# Patient Record
Sex: MALE | Race: WHITE | Employment: FULL TIME | URBAN - METROPOLITAN AREA
[De-identification: names, ages, dates, MRNs, and addresses within clinical notes are randomized per-mention and may not be internally consistent; named-entity substitution may affect disease eponyms.]

---

## 2017-05-02 RX ORDER — AZITHROMYCIN 250 MG/1
TABLET, FILM COATED ORAL
Qty: 6 TAB | Refills: 0 | Status: SHIPPED | OUTPATIENT
Start: 2017-05-02 | End: 2017-05-07

## 2017-05-24 ENCOUNTER — TELEPHONE (OUTPATIENT)
Dept: INTERNAL MEDICINE CLINIC | Age: 62
End: 2017-05-24

## 2017-05-24 RX ORDER — CEFUROXIME AXETIL 500 MG/1
TABLET ORAL
Qty: 20 TAB | Refills: 0 | Status: SHIPPED | OUTPATIENT
Start: 2017-05-24 | End: 2021-11-17

## 2018-03-14 ENCOUNTER — TELEPHONE (OUTPATIENT)
Dept: INTERNAL MEDICINE CLINIC | Age: 63
End: 2018-03-14

## 2018-03-14 RX ORDER — AZITHROMYCIN 250 MG/1
TABLET, FILM COATED ORAL
Qty: 6 TAB | Refills: 0 | Status: CANCELLED | OUTPATIENT
Start: 2018-03-14 | End: 2018-03-19

## 2018-03-14 NOTE — TELEPHONE ENCOUNTER
Requested Prescriptions     Pending Prescriptions Disp Refills    azithromycin (ZITHROMAX) 250 mg tablet 6 Tab 0     Sig: Take 2 tablets by mouth today then 1 tablet daily     Note:  Patient has sinus infection and request this medication

## 2018-03-15 RX ORDER — AZITHROMYCIN 250 MG/1
TABLET, FILM COATED ORAL
Qty: 6 TAB | Refills: 0 | Status: SHIPPED | OUTPATIENT
Start: 2018-03-15 | End: 2018-03-15 | Stop reason: SDUPTHER

## 2018-03-15 RX ORDER — AZITHROMYCIN 250 MG/1
TABLET, FILM COATED ORAL
Qty: 6 TAB | Refills: 0 | Status: SHIPPED | OUTPATIENT
Start: 2018-03-15 | End: 2018-03-20

## 2021-11-17 ENCOUNTER — VIRTUAL VISIT (OUTPATIENT)
Dept: FAMILY MEDICINE CLINIC | Age: 66
End: 2021-11-17
Payer: COMMERCIAL

## 2021-11-17 DIAGNOSIS — Z87.01 HISTORY OF RECENT PNEUMONIA: ICD-10-CM

## 2021-11-17 DIAGNOSIS — I72.9 ANEURYSM (HCC): Primary | ICD-10-CM

## 2021-11-17 DIAGNOSIS — I21.4 NON-ST ELEVATION MI (NSTEMI) (HCC): ICD-10-CM

## 2021-11-17 DIAGNOSIS — N28.9 RENAL INSUFFICIENCY: ICD-10-CM

## 2021-11-17 DIAGNOSIS — I51.9 SYSTOLIC DYSFUNCTION: ICD-10-CM

## 2021-11-17 DIAGNOSIS — I10 PRIMARY HYPERTENSION: ICD-10-CM

## 2021-11-17 DIAGNOSIS — Z98.2 S/P VP SHUNT: ICD-10-CM

## 2021-11-17 PROCEDURE — 99203 OFFICE O/P NEW LOW 30 MIN: CPT | Performed by: FAMILY MEDICINE

## 2021-11-17 RX ORDER — ACETAMINOPHEN 325 MG/1
650 TABLET ORAL
COMMUNITY
Start: 2021-11-08 | End: 2021-11-17 | Stop reason: SDUPTHER

## 2021-11-17 RX ORDER — HYDROCHLOROTHIAZIDE 50 MG/1
50 TABLET ORAL DAILY
COMMUNITY
Start: 2021-11-08 | End: 2021-11-17 | Stop reason: SDUPTHER

## 2021-11-17 RX ORDER — ACETAMINOPHEN 325 MG/1
650 TABLET ORAL
Qty: 90 TABLET | Refills: 0 | Status: SHIPPED | OUTPATIENT
Start: 2021-11-17 | End: 2021-12-07

## 2021-11-17 RX ORDER — NITROGLYCERIN 0.4 MG/1
0.4 TABLET SUBLINGUAL
COMMUNITY
Start: 2021-11-08 | End: 2021-11-18

## 2021-11-17 RX ORDER — POLYETHYLENE GLYCOL 3350 17 G/17G
17 POWDER, FOR SOLUTION ORAL DAILY
COMMUNITY
Start: 2021-11-09 | End: 2021-12-09

## 2021-11-17 RX ORDER — LANOLIN ALCOHOL/MO/W.PET/CERES
3 CREAM (GRAM) TOPICAL
Qty: 90 TABLET | Refills: 1 | Status: SHIPPED | OUTPATIENT
Start: 2021-11-17 | End: 2021-12-17

## 2021-11-17 RX ORDER — GUAIFENESIN 100 MG/5ML
81 LIQUID (ML) ORAL DAILY
COMMUNITY
Start: 2021-11-08 | End: 2021-11-17 | Stop reason: SDUPTHER

## 2021-11-17 RX ORDER — LANOLIN ALCOHOL/MO/W.PET/CERES
3 CREAM (GRAM) TOPICAL
COMMUNITY
Start: 2021-11-08 | End: 2021-11-17 | Stop reason: SDUPTHER

## 2021-11-17 RX ORDER — HYDRALAZINE HYDROCHLORIDE 50 MG/1
50 TABLET, FILM COATED ORAL 3 TIMES DAILY
Qty: 270 TABLET | Refills: 1 | Status: SHIPPED | OUTPATIENT
Start: 2021-11-17 | End: 2022-04-05 | Stop reason: SDUPTHER

## 2021-11-17 RX ORDER — HYDRALAZINE HYDROCHLORIDE 50 MG/1
50 TABLET, FILM COATED ORAL
COMMUNITY
Start: 2021-11-08 | End: 2021-11-17 | Stop reason: SDUPTHER

## 2021-11-17 RX ORDER — AMLODIPINE BESYLATE 5 MG/1
5 TABLET ORAL DAILY
Qty: 90 TABLET | Refills: 1 | Status: SHIPPED | OUTPATIENT
Start: 2021-11-17 | End: 2022-02-16

## 2021-11-17 RX ORDER — DICLOFENAC SODIUM 10 MG/G
2 GEL TOPICAL 4 TIMES DAILY
COMMUNITY
Start: 2021-11-08 | End: 2022-05-02

## 2021-11-17 RX ORDER — GUAIFENESIN 100 MG/5ML
81 LIQUID (ML) ORAL DAILY
Qty: 90 TABLET | Refills: 1 | Status: SHIPPED | OUTPATIENT
Start: 2021-11-17 | End: 2021-12-17

## 2021-11-17 RX ORDER — DOCUSATE SODIUM 100 MG/1
100 CAPSULE, LIQUID FILLED ORAL
COMMUNITY
Start: 2021-11-08 | End: 2021-12-08

## 2021-11-17 RX ORDER — HYDROCHLOROTHIAZIDE 50 MG/1
50 TABLET ORAL DAILY
Qty: 90 TABLET | Refills: 1 | Status: SHIPPED | OUTPATIENT
Start: 2021-11-17 | End: 2021-12-17

## 2021-11-17 RX ORDER — CALCIUM CARBONATE 200(500)MG
500 TABLET,CHEWABLE ORAL DAILY PRN
COMMUNITY
Start: 2021-11-08 | End: 2021-11-28

## 2021-11-17 RX ORDER — LANOLIN ALCOHOL/MO/W.PET/CERES
100 CREAM (GRAM) TOPICAL DAILY
COMMUNITY
Start: 2021-11-08 | End: 2021-11-17 | Stop reason: SDUPTHER

## 2021-11-17 RX ORDER — LISINOPRIL 5 MG/1
5 TABLET ORAL DAILY
Qty: 90 TABLET | Refills: 1 | Status: SHIPPED
Start: 2021-11-17 | End: 2022-02-16 | Stop reason: ALTCHOICE

## 2021-11-17 RX ORDER — LISINOPRIL 5 MG/1
5 TABLET ORAL DAILY
COMMUNITY
Start: 2021-11-08 | End: 2021-11-17 | Stop reason: SDUPTHER

## 2021-11-17 RX ORDER — BISACODYL 5 MG
10 TABLET, DELAYED RELEASE (ENTERIC COATED) ORAL
COMMUNITY
Start: 2021-11-08 | End: 2021-12-08

## 2021-11-17 RX ORDER — LANOLIN ALCOHOL/MO/W.PET/CERES
100 CREAM (GRAM) TOPICAL DAILY
Qty: 90 TABLET | Refills: 1 | Status: SHIPPED | OUTPATIENT
Start: 2021-11-17 | End: 2021-12-17

## 2021-11-17 RX ORDER — AMLODIPINE BESYLATE 5 MG/1
5 TABLET ORAL DAILY
COMMUNITY
Start: 2021-11-09 | End: 2021-11-17 | Stop reason: SDUPTHER

## 2021-11-17 NOTE — PROGRESS NOTES
Milagro Ortiz is a 61 y.o. male who was seen by synchronous (real-time) audio-video technology on 11/17/2021 for New Patient and Hospital Follow Up (SNG Aneurysm and elevated Blood Pressure)        Assessment & Plan:   Diagnoses and all orders for this visit:    Aneurysm [facility]): Post stent and revision. Coming up appointment with neurology in 2 months after repeating CT. Surgical site healed well. No concern. Currently doing outpatient rehab. PT OT and speech. Has not made an appointment with cardiology and nephrology yet. No new referral.  Systolic dysfunction but currently no signs of volume overload. We will continue current plan pain does not express to cardiology. Reviewed pressure. Currently diastolic blood pressure low-salt diet family take cardiovascular accommodation. Comments:  MCA, subarachnoid hemorrhage, presence of  shunt . h/o NSTEMI and hypoxia. required intubation during hospitalization. Systolic dysfunction: Ejection fraction around 35%. Currently no signs of volume overload. He had a recent non-ST elevation MI. On risk factor management. Taking aspirin. DC Plavix met neurologist upon discharge  -     REFERRAL TO CARDIOLOGY    Renal insufficiency: Creatinine from discharge from rehab was around 1.6. Avoid NSAIDs. Will observe  -     REFERRAL TO NEPHROLOGY    Non-ST elevation MI (NSTEMI) ([facility] [address]): No anginal symptoms. Risk factor management    S/P  shunt    History of recent pneumonia: No cold or cough or wheezing. Probably aspiration. Resolved    Primary hypertension: Currently blood pressure has improved significantly. Taking medication with compliance. Will observe and follow neurosurgeon and cardiology recommendation    Other orders  -     acetaminophen (TYLENOL) 325 mg tablet; Take 2 Tablets by mouth every four (4) hours as needed for Pain for up to 20 days. , Normal, Disp-90 Tablet, R-0  -     amLODIPine (NORVASC) 5 mg tablet; Take 1 Tablet by mouth daily. , Normal, Disp-90 Tablet, R-1  -     aspirin 81 mg chewable tablet; Take 1 Tablet by mouth daily for 30 days. , Normal, Disp-90 Tablet, R-1  -     hydrALAZINE (APRESOLINE) 50 mg tablet; Take 1 Tablet by mouth three (3) times daily for 90 days. , Normal, Disp-270 Tablet, R-1  -     lisinopriL (PRINIVIL, ZESTRIL) 5 mg tablet; Take 1 Tablet by mouth daily. , Normal, Disp-90 Tablet, R-1  -     thiamine HCL (B-1) 100 mg tablet; Take 1 Tablet by mouth daily for 30 days. , Normal, Disp-90 Tablet, R-1  -     melatonin 3 mg tablet; Take 1 Tablet by mouth nightly for 30 days. , Normal, Disp-90 Tablet, R-1  -     hydroCHLOROthiazide (HYDRODIURIL) 50 mg tablet; Take 1 Tablet by mouth daily for 30 days. , Normal, Disp-90 Tablet, R-1    pt and his wife both were present during entire visit and and agree with the plan. Follow up in 2 months. Will discuss health maintenance during follow up visit. Please note that this dictation was completed with [employer], the [employer]omas[employer] voice recognition software. Quite often unanticipated grammatical, syntax, homophones, and other interpretive errors are inadvertently transcribed by the computer software. Please disregard these errors. Please excuse any errors that have escaped final proofreading. [phone]  Subjective:   Here as a new patient to establish care. Recently discharged from the hospital on November 8. Below is a brief summary from the hospitalization. Mr. Evan Chavarria is a 61 y.o. male with a PMHx of acute systolic heart failure (ejection fraction 35%) and hypertension. Admitted for worsening headache over 2 days. He was found to have right MCA aneurysm with subarachnoid hemorrhage. CTA head showed severe stenosis of right vertebral artery. Right frontal  shunt was placed 9/28 and subsequently embolism of right MCA aneurysm 9/29.  His hospital course was complicated by acute encephalopathy, demand ischemia NSTEMI, acute respiratory failure requiring reintubation in the ICU, dysphagia, and suspected MSSA pneumonia status post course of nafcillin. Now with impaired mobility and ADLs. Transferred to [facility] for [facility] . Currently feeling better. No concern at surgical site. Has coming up appointment with neurosurgery in 2 months after repeat CTA. Right facial area with the plan. No prior history of high blood pressure. Currently vitals is fairly stable. Some minor fluctuation of diastolic blood pressure in 90s sometimes. Patient denied any headache or dizziness. No nausea or vomiting. No abdominal pain. No urinary or bowel complaint. No cold or cough or wheezing. No fever. Appetite is fair. No trouble swallowing. No trouble speech. No extremity weakness. Ambulating without support. Does most of his ADLs on his own. Doing outpatient rehab, PT/OT/speech therapy. Reviewed discharge summary medication list.  Recommended to DC Plavix and stay on aspirin. No signs of bruises or any active bleeding. Noted acute kidney injury. Before discharge to rehab creatinine was around 1.8. Discussed to take Tylenol and avoid NSAIDs. He has not made an appointment with the cardiology and nephrology. I have done referral today. Noted low ejection fraction during the admission. Acute systolic failure. Currently no signs of volume overload. No leg swelling. Review discharge from hospital to rehab. H&H was stable. Mild elevated creatinine. LFTs within normal limits. Prior to Admission medications    Medication Sig Start Date End Date Taking? Authorizing Provider   acetaminophen (TYLENOL) 325 mg tablet Take 650 mg by mouth every four (4) hours as needed. 11/8/21 11/28/21 Yes Provider, Historical   amLODIPine (NORVASC) 5 mg tablet Take 5 mg by mouth daily. 11/9/21  Yes Provider, Historical   aspirin 81 mg chewable tablet Take 81 mg by mouth daily. 11/8/21 12/8/21 Yes Provider, Historical   bisacodyL (DULCOLAX) 5 mg EC tablet Take 10 mg by mouth.  11/8/21 12/8/21 Yes Provider, Historical   calcium carbonate (TUMS) 200 mg calcium (500 mg) chew Take 500 mg by mouth daily as needed. 11/8/21 11/28/21 Yes Provider, Historical   diclofenac (VOLTAREN) 1 % gel Apply 2 g to affected area four (4) times daily. 11/8/21  Yes Provider, Historical   docusate sodium (COLACE) 100 mg capsule Take 100 mg by mouth. 11/8/21 12/8/21 Yes Provider, Historical   hydrALAZINE (APRESOLINE) 50 mg tablet Take 50 mg by mouth. 11/8/21 12/8/21 Yes Provider, Historical   hydroCHLOROthiazide (HYDRODIURIL) 50 mg tablet Take 50 mg by mouth daily. 11/8/21 12/8/21 Yes Provider, Historical   lisinopriL (PRINIVIL, ZESTRIL) 5 mg tablet Take 5 mg by mouth daily. 11/8/21  Yes Provider, Historical   melatonin 3 mg tablet Take 3 mg by mouth. 11/8/21 12/8/21 Yes Provider, Historical   nitroglycerin (NITROSTAT) 0.4 mg SL tablet 0.4 mg by SubLINGual route. 11/8/21 11/18/21 Yes Provider, Historical   polyethylene glycol (MIRALAX) 17 gram/dose powder Take 17 g by mouth daily. 11/9/21 12/9/21 Yes Provider, Historical   thiamine HCL (B-1) 100 mg tablet Take 100 mg by mouth daily. 11/8/21 12/8/21 Yes Provider, Historical   cefUROXime (CEFTIN) 500 mg tablet 1 tablet twice per day  Patient not taking: Reported on 11/17/2021 5/24/17 11/17/21  Remigio Hendrix MD   pseudoephedrine CR (SUDAFED 12 HOUR) 120 mg CR tablet Take 1 Tab by mouth two (2) times daily as needed for Congestion.   Patient not taking: Reported on 11/17/2021 8/22/16 11/17/21  Remigio Hendrix MD   gabapentin (NEURONTIN) 300 mg capsule 2 po tid -- taper up as directed  Indications: NEUROPATHIC PAIN  Patient not taking: Reported on 11/17/2021 1/5/16 11/17/21  Stephanie Painter MD   traMADol Lori Corn) 50 mg tablet 1 po tid prn severe pain  Indications: NEUROPATHIC PAIN, PAIN  Patient not taking: Reported on 11/17/2021 1/5/16 11/17/21  Stephanie Painter MD   traMADol Lori Valera) 50 mg tablet Take 1-2 po bid prn pain  Patient not taking: Reported on 11/17/2021 12/31/15 11/17/21 Adri Fernandez MD   acetaminophen-codeine (TYLENOL #3) 300-30 mg per tablet 1-2 po q pm prn pain  Indications: PAIN  Patient not taking: Reported on 11/17/2021 12/17/15   Adri Fernandez MD   gabapentin (NEURONTIN) 300 mg capsule Take 1 in the evening for 1 week then increase to 2 the second week and then add 1 in the am  Indications: NEUROPATHIC PAIN  Patient not taking: Reported on 11/17/2021 12/17/15 11/17/21  Adri Fernandez MD   metaxalone [facility]) 800 mg tablet 1 tablet three times per day  Patient not taking: Reported on 11/17/2021 12/7/15 11/17/21  Vivian Hannah MD     There are no problems to display for this patient. Current Outpatient Medications   Medication Sig Dispense Refill    bisacodyL (DULCOLAX) 5 mg EC tablet Take 10 mg by mouth.  calcium carbonate (TUMS) 200 mg calcium (500 mg) chew Take 500 mg by mouth daily as needed.  diclofenac (VOLTAREN) 1 % gel Apply 2 g to affected area four (4) times daily.  docusate sodium (COLACE) 100 mg capsule Take 100 mg by mouth.  nitroglycerin (NITROSTAT) 0.4 mg SL tablet 0.4 mg by SubLINGual route.  polyethylene glycol (MIRALAX) 17 gram/dose powder Take 17 g by mouth daily.  acetaminophen (TYLENOL) 325 mg tablet Take 2 Tablets by mouth every four (4) hours as needed for Pain for up to 20 days. 90 Tablet 0    amLODIPine (NORVASC) 5 mg tablet Take 1 Tablet by mouth daily. 90 Tablet 1    aspirin 81 mg chewable tablet Take 1 Tablet by mouth daily for 30 days. 90 Tablet 1    hydrALAZINE (APRESOLINE) 50 mg tablet Take 1 Tablet by mouth three (3) times daily for 90 days. 270 Tablet 1    lisinopriL (PRINIVIL, ZESTRIL) 5 mg tablet Take 1 Tablet by mouth daily. 90 Tablet 1    thiamine HCL (B-1) 100 mg tablet Take 1 Tablet by mouth daily for 30 days. 90 Tablet 1    melatonin 3 mg tablet Take 1 Tablet by mouth nightly for 30 days.  90 Tablet 1    hydroCHLOROthiazide (HYDRODIURIL) 50 mg tablet Take 1 Tablet by mouth daily for 30 days. 90 Tablet 1     No Known Allergies  No past medical history on file. Past Surgical History:   Procedure Laterality Date    HX APPENDECTOMY       Family History   Problem Relation Age of Onset    Heart Disease Father         mi at age 77     Social History     Tobacco Use    Smoking status: Current Every Day Smoker     Packs/day: 1.00    Smokeless tobacco: Not on file   Substance Use Topics    Alcohol use: Yes     Comment: socially       ROS    Objective:     Patient-Reported Vitals 11/17/2021   Patient-Reported Systolic  143   Patient-Reported Diastolic 86      General: alert, cooperative, no distress   Mental  status: normal mood, behavior, speech, dress, motor activity, and thought processes, able to follow commands   HENT: NCAT   Neck: no visualized mass   Resp: no respiratory distress   Neuro: no gross deficits   Skin: no discoloration or lesions of concern on visible areas   Psychiatric: normal affect, consistent with stated mood, no evidence of hallucinations     Additional exam findings: We discussed the expected course, resolution and complications of the diagnosis(es) in detail. Medication risks, benefits, costs, interactions, and alternatives were discussed as indicated. I advised him to contact the office if his condition worsens, changes or fails to improve as anticipated. He expressed understanding with the diagnosis(es) and plan. Henrry Quiroga, was evaluated through a synchronous (real-time) audio-video encounter. The patient (or guardian if applicable) is aware that this is a billable service. Verbal consent to proceed has been obtained within the past 12 months. The visit was conducted pursuant to the emergency declaration under the [address] [place] [address], [address] [place] [address] authority and the [employer] and [employer] General Act. Patient identification was verified, and a caregiver was present when appropriate.  The patient was located in a state where the provider was credentialed to provide care.     Zeynep Chacon MD

## 2021-12-30 ENCOUNTER — TELEPHONE (OUTPATIENT)
Dept: FAMILY MEDICINE CLINIC | Age: 66
End: 2021-12-30

## 2021-12-30 NOTE — TELEPHONE ENCOUNTER
Patient needs a referral for speech therapy for out of network provider:    [facility]   [address] [facility].  [id number] [phone]

## 2022-01-03 DIAGNOSIS — Z98.2 S/P VP SHUNT: Primary | ICD-10-CM

## 2022-01-05 ENCOUNTER — OFFICE VISIT (OUTPATIENT)
Dept: CARDIOLOGY CLINIC | Age: 67
End: 2022-01-05
Payer: COMMERCIAL

## 2022-01-05 VITALS
OXYGEN SATURATION: 97 % | HEART RATE: 75 BPM | DIASTOLIC BLOOD PRESSURE: 78 MMHG | WEIGHT: 205 LBS | BODY MASS INDEX: 26.31 KG/M2 | SYSTOLIC BLOOD PRESSURE: 112 MMHG | HEIGHT: 74 IN

## 2022-01-05 DIAGNOSIS — I35.1 NONRHEUMATIC AORTIC VALVE INSUFFICIENCY: ICD-10-CM

## 2022-01-05 DIAGNOSIS — I42.9 CARDIOMYOPATHY, UNSPECIFIED TYPE (HCC): Primary | ICD-10-CM

## 2022-01-05 DIAGNOSIS — I10 PRIMARY HYPERTENSION: ICD-10-CM

## 2022-01-05 PROCEDURE — 99204 OFFICE O/P NEW MOD 45 MIN: CPT | Performed by: INTERNAL MEDICINE

## 2022-01-05 RX ORDER — ACETAMINOPHEN 325 MG/1
325 TABLET ORAL
COMMUNITY

## 2022-01-05 RX ORDER — ASPIRIN 325 MG
325 TABLET ORAL DAILY
COMMUNITY
End: 2022-05-05 | Stop reason: DRUGHIGH

## 2022-01-05 RX ORDER — POLYETHYLENE GLYCOL 3350 17 G/17G
17 POWDER, FOR SOLUTION ORAL DAILY
COMMUNITY

## 2022-01-05 NOTE — PROGRESS NOTES
HISTORY OF PRESENT ILLNESS  Shamir Rodriguez is a 61 y.o. male. 1/22 seen as a new patient for cardiomyopathy. Noted after subarachnoid hemorrhage admission in 9/21 at [facility].  He also had aneurysm of [place] and was stented for that. Patient denies significant chest pain, SOB, palpitations, dizziness      Leg Swelling  The history is provided by the patient. This is a new problem. The current episode started more than 1 week ago (10/21). The problem occurs every several days. Pertinent negatives include no chest pain, no headaches and no shortness of breath. Nothing aggravates the symptoms. Review of Systems   Constitutional: Negative for chills, fever, malaise/fatigue and weight loss. HENT: Negative for nosebleeds. Eyes: Negative for discharge. Respiratory: Negative for cough, shortness of breath and wheezing. Cardiovascular: Positive for leg swelling. Negative for chest pain, palpitations, orthopnea, claudication and PND. Gastrointestinal: Negative for diarrhea, nausea and vomiting. Genitourinary: Negative for dysuria and hematuria. Musculoskeletal: Negative for joint pain. Skin: Negative for rash. Neurological: Negative for dizziness, seizures, loss of consciousness and headaches. Endo/Heme/Allergies: Negative for polydipsia. Does not bruise/bleed easily. Psychiatric/Behavioral: Negative for depression and substance abuse. The patient does not have insomnia.       9/21 echo   Narrative    CONCLUSIONS     * Left ventricular systolic function is moderately reduced with an ejection   fraction of 34 % by Gao's biplane.     * Diastolic dysfunction is indeterminate but has characteristics of Grade   III (severe) diastolic dysfunction.     * There is severely increased left ventricular wall thickness.     * Right ventricular systolic function is reduced with TAPSE measuring 1.55   cm.     * There is mild diffuse thickening (sclerosis) of the aortic valve cusps.     * There is mild aortic valve regurgitation.     * Estimated pulmonary arterial systolic pressure is 23 mmHg.     * The aortic root at the sinus of Valsalva is dilated measuring 4.0 cm with   an index of 1.78 cm/m2.     * No evidence of shunt at atrial level by 2D, color flow and negative bubble   study. Comparison     * No prior study is available for comparison. 11/21 nuclear stress test  CONCLUSIONS     * No definite pathologic perfusion defects are seen. Study is negative for   definite infarct or ischemia.     * The left ventricle is dilated on both stress and rest images. There is   generalized hypokinesis with a calculated LVEF=33%.     * Findings are most c/w a nonischemic cardiomyopathy. Physical Exam  Constitutional:       General: He is not in acute distress. Appearance: He is well-developed. HENT:      Head: Normocephalic and atraumatic. Mouth/Throat:      Dentition: Normal dentition. Eyes:      General: No scleral icterus. Right eye: No discharge. Left eye: No discharge. Neck:      Thyroid: No thyromegaly. Vascular: No carotid bruit or JVD. Cardiovascular:      Rate and Rhythm: Normal rate and regular rhythm. Pulses: Intact distal pulses. Heart sounds: S1 normal and S2 normal. Murmur heard. Blowing midsystolic murmur is present with a grade of 2/6 at the upper right sternal border. No friction rub. No gallop. Pulmonary:      Effort: Pulmonary effort is normal.      Breath sounds: Normal breath sounds. No wheezing or rales. Abdominal:      Palpations: Abdomen is soft. There is no mass. Tenderness: There is no abdominal tenderness. Musculoskeletal:      Cervical back: Neck supple. Right lower leg: No edema. Left lower leg: No edema. Lymphadenopathy:      Cervical:      Right cervical: No superficial cervical adenopathy. Left cervical: No superficial cervical adenopathy.    Skin:     General: Skin is warm and dry. Findings: No rash. Neurological:      Mental Status: He is alert and oriented to person, place, and time. Psychiatric:         Behavior: Behavior normal.         ASSESSMENT and PLAN      Diagnoses and all orders for this visit:    1. Cardiomyopathy, unspecified type ([facility] [address]  -     METABOLIC PANEL, BASIC; Future  -     LIPID PANEL; Future  -     HEPATIC FUNCTION PANEL; Future  -     CBC W/O DIFF; Future  -     TSH AND FREE T4; Future    2. Primary hypertension  -     METABOLIC PANEL, BASIC; Future  -     LIPID PANEL; Future  -     HEPATIC FUNCTION PANEL; Future  -     CBC W/O DIFF; Future  -     TSH AND FREE T4; Future    3. Nonrheumatic aortic valve insufficiency        Pertinent laboratory and test data reviewed and discussed with patient. See patient instructions also for other medical advice given    There are no discontinued medications. Follow-up and Dispositions    · Return in about 6 weeks (around 2/16/2022), or if symptoms worsen or fail to improve, for post test, BP log x 4-5 days post med changes. 1/5/2022 cardiomyopathy is likely nonischemic as the stress test was normal.  Blood pressure is treated well and controlled now. He does have bradycardia frequently at home and so not a good candidate for beta-blockers. Would like to use Entresto but will need labs specially the renal function and potassium before changing lisinopril. Labs as ordered. Aortic incompetence is mild and will be followed clinically.

## 2022-01-05 NOTE — PATIENT INSTRUCTIONS
There are no discontinued medications. After the recommended changes have been made in blood pressure medicines, patient advised to keep BP/HR(pulse rate) chart twice daily and bring us results in next 4 to 5 days. Patient may send the results via \"My Chart\" if desired. Please rest for 5-10 minutes before checking blood pressure. Sit on a comfortable chair without crossing the legs and put your arm on a table. We recommend that you use an upper arm cuff. Check the blood pressure 3 times each time you check the blood pressure and record the lowest reading. If you check the blood pressure in both arms, use the higher reading. Learning About the [address] [facility] Diet  What is the Mediterranean diet? The Mediterranean diet is a style of eating rather than a diet plan. It features foods eaten in [place], [place], [place] and [place], and other countries along the [facility]. It emphasizes eating foods like fish, fruits, vegetables, beans, high-fiber breads and whole grains, nuts, and olive oil. This style of eating includes limited red meat, cheese, and sweets. Why choose the Mediterranean diet? A Mediterranean-style diet may improve heart health. It contains more fat than other heart-healthy diets. But the fats are mainly from nuts, unsaturated oils (such as fish oils and olive oil), and certain nut or seed oils (such as canola, soybean, or flaxseed oil). These fats may help protect the heart and blood vessels. How can you get started on the Mediterranean diet? Here are some things you can do to switch to a more Mediterranean way of eating. What to eat  · Eat a variety of fruits and vegetables each day, such as grapes, blueberries, tomatoes, broccoli, peppers, figs, olives, spinach, eggplant, beans, lentils, and chickpeas. · Eat a variety of whole-grain foods each day, such as oats, brown rice, and whole wheat bread, pasta, and couscous. · Eat fish at least 2 times a week.  Try tuna, salmon, mackerel, lake trout, herring, or sardines. · Eat moderate amounts of low-fat dairy products, such as milk, cheese, or yogurt. · Eat moderate amounts of poultry and eggs. · Choose healthy (unsaturated) fats, such as nuts, olive oil, and certain nut or seed oils like canola, soybean, and flaxseed. · Limit unhealthy (saturated) fats, such as butter, palm oil, and coconut oil. And limit fats found in animal products, such as meat and dairy products made with whole milk. Try to eat red meat only a few times a month in very small amounts. · Limit sweets and desserts to only a few times a week. This includes sugar-sweetened drinks like soda. The Mediterranean diet may also include red wine with your meal--1 glass each day for women and up to 2 glasses a day for men. Tips for eating at home  · Use herbs, spices, garlic, lemon zest, and citrus juice instead of salt to add flavor to foods. · Add avocado slices to your sandwich instead of morgan. · Have fish for lunch or dinner instead of red meat. Brush the fish with olive oil, and broil or grill it. · Sprinkle your salad with seeds or nuts instead of cheese. · Cook with olive or canola oil instead of butter or oils that are high in saturated fat. · Switch from 2% milk or whole milk to 1% or fat-free milk. · Dip raw vegetables in a vinaigrette dressing or hummus instead of dips made from mayonnaise or sour cream.  · Have a piece of fruit for dessert instead of a piece of cake. Try baked apples, or have some dried fruit. Tips for eating out  · Try broiled, grilled, baked, or poached fish instead of having it fried or breaded. · Ask your  to have your meals prepared with olive oil instead of butter. · Order dishes made with marinara sauce or sauces made from olive oil. Avoid sauces made from cream or mayonnaise. · Choose whole-grain breads, whole wheat pasta and pizza crust, brown rice, beans, and lentils. · Cut back on butter or margarine on bread. Instead, you can dip your bread in a small amount of olive oil. · Ask for a side salad or grilled vegetables instead of french fries or chips. Where can you learn more? Go to http://www.forman.com/  Enter O407 in the search box to learn more about \"Learning About the Mediterranean Diet. \"  Current as of: December 17, 2020               Content Version: 13.0  © 2006-2021 [employer]. Care instructions adapted under license by [employer] (which disclaims liability or warranty for this information). If you have questions about a medical condition or this instruction, always ask your healthcare professional. [place] [address] any warranty or liability for your use of this information.

## 2022-01-05 NOTE — PROGRESS NOTES
Abel Max presents today for   Chief Complaint   Patient presents with    New Patient     Referral by PCP dr. Oscar Brody for sytoloic dysfunction       Abel Max preferred language for health care discussion is english/other. Is someone accompanying this pt? yes    Is the patient using any DME equipment during OV? wheelchair    Depression Screening:  3 most recent PHQ Screens 1/5/2022   Little interest or pleasure in doing things Not at all   Feeling down, depressed, irritable, or hopeless Not at all   Total Score PHQ 2 0       Learning Assessment:  Learning Assessment 12/17/2015   PRIMARY LEARNER Patient   HIGHEST LEVEL OF EDUCATION - PRIMARY LEARNER  SOME COLLEGE   PRIMARY LANGUAGE ENGLISH   LEARNER PREFERENCE PRIMARY READING   ANSWERED BY patient   RELATIONSHIP SELF       Abuse Screening:  Abuse Screening Questionnaire 1/5/2022   Do you ever feel afraid of your partner? N   Are you in a relationship with someone who physically or mentally threatens you? N   Is it safe for you to go home? Y       Fall Risk  No flowsheet data found. Pt currently taking Anticoagulant therapy? no    Pt currently taking Antiplatelet therapy ? Aspirin 81 mg      Coordination of Care:  1. Have you been to the ER, urgent care clinic since your last visit? Hospitalized since your last visit? no    2. Have you seen or consulted any other health care providers outside of the [address] [place] [address] since your last visit? Include any pap smears or colon screening.  no

## 2022-01-08 LAB
ALBUMIN SERPL-MCNC: 4.5 G/DL (ref 3.8–4.8)
ALP SERPL-CCNC: 55 IU/L (ref 44–121)
ALT SERPL-CCNC: 14 IU/L (ref 0–44)
AST SERPL-CCNC: 12 IU/L (ref 0–40)
BILIRUB DIRECT SERPL-MCNC: 0.14 MG/DL (ref 0–0.4)
BILIRUB SERPL-MCNC: 0.5 MG/DL (ref 0–1.2)
BUN SERPL-MCNC: 50 MG/DL (ref 8–27)
BUN/CREAT SERPL: 26 (ref 10–24)
CALCIUM SERPL-MCNC: 10.7 MG/DL (ref 8.6–10.2)
CHLORIDE SERPL-SCNC: 99 MMOL/L (ref 96–106)
CHOLEST SERPL-MCNC: 224 MG/DL (ref 100–199)
CO2 SERPL-SCNC: 22 MMOL/L (ref 20–29)
CREAT SERPL-MCNC: 1.91 MG/DL (ref 0.76–1.27)
ERYTHROCYTE [DISTWIDTH] IN BLOOD BY AUTOMATED COUNT: 12.5 % (ref 11.6–15.4)
GLUCOSE SERPL-MCNC: 96 MG/DL (ref 65–99)
HCT VFR BLD AUTO: 38.2 % (ref 37.5–51)
HDLC SERPL-MCNC: 50 MG/DL
HGB BLD-MCNC: 12.7 G/DL (ref 13–17.7)
IMP & REVIEW OF LAB RESULTS: NORMAL
LDLC SERPL CALC-MCNC: 151 MG/DL (ref 0–99)
MCH RBC QN AUTO: 31.1 PG (ref 26.6–33)
MCHC RBC AUTO-ENTMCNC: 33.2 G/DL (ref 31.5–35.7)
MCV RBC AUTO: 93 FL (ref 79–97)
PLATELET # BLD AUTO: 299 X10E3/UL (ref 150–450)
POTASSIUM SERPL-SCNC: 5 MMOL/L (ref 3.5–5.2)
PROT SERPL-MCNC: 7 G/DL (ref 6–8.5)
RBC # BLD AUTO: 4.09 X10E6/UL (ref 4.14–5.8)
SODIUM SERPL-SCNC: 137 MMOL/L (ref 134–144)
T4 FREE SERPL-MCNC: 1.31 NG/DL (ref 0.82–1.77)
TRIGL SERPL-MCNC: 128 MG/DL (ref 0–149)
TSH SERPL DL<=0.005 MIU/L-ACNC: 3.48 UIU/ML (ref 0.45–4.5)
VLDLC SERPL CALC-MCNC: 23 MG/DL (ref 5–40)
WBC # BLD AUTO: 7.6 X10E3/UL (ref 3.4–10.8)

## 2022-01-10 ENCOUNTER — TELEPHONE (OUTPATIENT)
Dept: CARDIOLOGY CLINIC | Age: 67
End: 2022-01-10

## 2022-01-10 DIAGNOSIS — I10 PRIMARY HYPERTENSION: Primary | ICD-10-CM

## 2022-01-10 RX ORDER — HYDROCHLOROTHIAZIDE 50 MG/1
50 TABLET ORAL DAILY
COMMUNITY
End: 2022-02-02 | Stop reason: ALTCHOICE

## 2022-01-10 NOTE — TELEPHONE ENCOUNTER
Spoke with patient regarding instructions per Dr. Bella Manriquez. Patient is taking hydrochlorothiazide 50mg daily updated in chart. Placed order for repeat BMP in 30 days and faxed results to PCP.

## 2022-01-10 NOTE — PROGRESS NOTES
Please contact patient and do the following asap  Please tell him that his kidney function is not improving since last done in [facility] 2 months ago. Drink more water and if he has any edema use compression stockings. Call if any shortness of breath. Confirm that he is not on any diuretics. Let him know that I will not be starting Entresto now but will consider it in future.   Rpt BMP  in 30 days prior to next visit  Fax to PCP for increased glucose

## 2022-01-10 NOTE — TELEPHONE ENCOUNTER
Spoke with patient per Dr. SHASTA CASANOVA [facility] pt to reduce HCTZ to 25mg daily and BMP to be done before next appointment. Patient understood to call if experiencing shortness of breath.

## 2022-01-10 NOTE — TELEPHONE ENCOUNTER
----- Message from Jaylene Bosworth, MD sent at 1/10/2022 11:25 AM EST -----  Please contact patient and do the following asap  Please tell him that his kidney function is not improving since last done in [facility] 2 months ago. Drink more water and if he has any edema use compression stockings. Call if any shortness of breath. Confirm that he is not on any diuretics. Let him know that I will not be starting Entresto now but will consider it in future.   Rpt BMP  in 30 days prior to next visit  Fax to PCP for increased glucose

## 2022-01-10 NOTE — TELEPHONE ENCOUNTER
Reduce HCTZ to 25 mg a day. BMP in 4 weeks before my next appointment.   Call if any shortness of breath

## 2022-01-12 NOTE — TELEPHONE ENCOUNTER
The referral order and most recent office [facility] at [phone]. A fax confirmation has been received. They will contact patient to schedule.

## 2022-02-02 ENCOUNTER — OFFICE VISIT (OUTPATIENT)
Dept: FAMILY MEDICINE CLINIC | Age: 67
End: 2022-02-02
Payer: COMMERCIAL

## 2022-02-02 VITALS
HEIGHT: 74 IN | TEMPERATURE: 98.1 F | BODY MASS INDEX: 26.31 KG/M2 | SYSTOLIC BLOOD PRESSURE: 120 MMHG | OXYGEN SATURATION: 97 % | RESPIRATION RATE: 16 BRPM | WEIGHT: 205 LBS | DIASTOLIC BLOOD PRESSURE: 78 MMHG | HEART RATE: 80 BPM

## 2022-02-02 DIAGNOSIS — I72.9 ANEURYSM (HCC): Primary | ICD-10-CM

## 2022-02-02 DIAGNOSIS — I51.9 SYSTOLIC DYSFUNCTION: ICD-10-CM

## 2022-02-02 DIAGNOSIS — Z87.01 HISTORY OF RECENT PNEUMONIA: ICD-10-CM

## 2022-02-02 DIAGNOSIS — I10 PRIMARY HYPERTENSION: ICD-10-CM

## 2022-02-02 DIAGNOSIS — Z98.2 S/P VP SHUNT: ICD-10-CM

## 2022-02-02 DIAGNOSIS — Z12.11 SCREENING FOR COLON CANCER: ICD-10-CM

## 2022-02-02 DIAGNOSIS — M25.50 ARTHRALGIA, UNSPECIFIED JOINT: ICD-10-CM

## 2022-02-02 DIAGNOSIS — N28.9 RENAL INSUFFICIENCY: ICD-10-CM

## 2022-02-02 DIAGNOSIS — E78.5 DYSLIPIDEMIA: ICD-10-CM

## 2022-02-02 PROCEDURE — 99214 OFFICE O/P EST MOD 30 MIN: CPT | Performed by: FAMILY MEDICINE

## 2022-02-02 RX ORDER — LANOLIN ALCOHOL/MO/W.PET/CERES
CREAM (GRAM) TOPICAL DAILY
COMMUNITY
End: 2022-08-02

## 2022-02-02 RX ORDER — CALCIUM CARBONATE 200(500)MG
500 TABLET,CHEWABLE ORAL
COMMUNITY
End: 2022-02-02

## 2022-02-02 RX ORDER — NITROGLYCERIN 0.4 MG/1
0.4 TABLET SUBLINGUAL
COMMUNITY

## 2022-02-02 RX ORDER — LANOLIN ALCOHOL/MO/W.PET/CERES
3 CREAM (GRAM) TOPICAL
COMMUNITY

## 2022-02-02 NOTE — PATIENT INSTRUCTIONS
A Healthy Lifestyle: Care Instructions  Your Care Instructions     A healthy lifestyle can help you feel good, stay at a healthy weight, and have plenty of energy for both work and play. A healthy lifestyle is something you can share with your whole family. A healthy lifestyle also can lower your risk for serious health problems, such as high blood pressure, heart disease, and diabetes. You can follow a few steps listed below to improve your health and the health of your family. Follow-up care is a key part of your treatment and safety. Be sure to make and go to all appointments, and call your doctor if you are having problems. It's also a good idea to know your test results and keep a list of the medicines you take. How can you care for yourself at home? · Do not eat too much sugar, fat, or fast foods. You can still have dessert and treats now and then. The goal is moderation. · Start small to improve your eating habits. Pay attention to portion sizes, drink less juice and soda pop, and eat more fruits and vegetables. ? Eat a healthy amount of food. A 3-ounce serving of meat, for example, is about the size of a deck of cards. Fill the rest of your plate with vegetables and whole grains. ? Limit the amount of soda and sports drinks you have every day. Drink more water when you are thirsty. ? Eat plenty of fruits and vegetables every day. Have an apple or some carrot sticks as an afternoon snack instead of a candy bar. Try to have fruits and/or vegetables at every meal.  · Make exercise part of your daily routine. You may want to start with simple activities, such as walking, bicycling, or slow swimming. Try to be active 30 to 60 minutes every day. You do not need to do all 30 to 60 minutes all at once. For example, you can exercise 3 times a day for 10 or 20 minutes.  Moderate exercise is safe for most people, but it is always a good idea to talk to your doctor before starting an exercise program.  · Keep moving. [place] the lawn, work in the garden, or [employer]. Take the stairs instead of the elevator at work. · If you smoke, quit. People who smoke have an increased risk for heart attack, stroke, cancer, and other lung illnesses. Quitting is hard, but there are ways to boost your chance of quitting tobacco for good. ? Use nicotine gum, patches, or lozenges. ? Ask your doctor about stop-smoking programs and medicines. ? Keep trying. In addition to reducing your risk of diseases in the future, you will notice some benefits soon after you stop using tobacco. If you have shortness of breath or asthma symptoms, they will likely get better within a few weeks after you quit. · Limit how much alcohol you drink. Moderate amounts of alcohol (up to 2 drinks a day for men, 1 drink a day for women) are okay. But drinking too much can lead to liver problems, high blood pressure, and other health problems. Family health  If you have a family, there are many things you can do together to improve your health. · Eat meals together as a family as often as possible. · Eat healthy foods. This includes fruits, vegetables, lean meats and dairy, and whole grains. · Include your family in your fitness plan. Most people think of activities such as jogging or tennis as the way to fitness, but there are many ways you and your family can be more active. Anything that makes you breathe hard and gets your heart pumping is exercise. Here are some tips:  ? Walk to do errands or to take your child to school or the bus.  ? Go for a family bike ride after dinner instead of watching TV. Where can you learn more? Go to http://azalea-bree.info/  Enter N692 in the search box to learn more about \"A Healthy Lifestyle: Care Instructions. \"  Current as of: June 16, 2021               Content Version: 13.0  © 1297-2682 Healthwise, Incorporated.    Care instructions adapted under license by [employer] (which disclaims liability or warranty for this information). If you have questions about a medical condition or this instruction, always ask your healthcare professional. [address] any warranty or liability for your use of this information.

## 2022-02-02 NOTE — PROGRESS NOTES
HISTORY OF PRESENT ILLNESS  Dougie Arriola is a 61 y.o. male. HPI: Here for follow-up. Recent admission to [facility] for aneurysm. Now post surgery and  shunt. Has seen neurosurgery and no new recommendation. Vitals been stable. He is under PT OT and speech therapy. Gradually improving. At home able to walk with support. Wife is supportive and no specific concern at this time. Initially elevated troponin and concern with non-ST elevation MI. Happened to be secondary to aneurysm. Systolic dysfunction. Has seen cardiology. Plan for Entresto but due to renal insufficiency waiting to start. Due to bradycardia not a candidate for beta-blocker. At this time wife is keeping a log for blood pressure and heart rate. Heart rate varies from 60s to 80s. He has coming up appointment with Dr. Aidee Ibanez. Will follow the recommendation. Currently on baby aspirin per the recommendation. Has seen nephrology for renal insufficiency. Will follow the recommendation. Also concerned with on and off joint pain mainly the knee, ankle, hand joints. I have advised to take Tylenol as needed. No joint swelling or redness. Willing to try. If no improvement will consider further work-up. Initially was given hydrochlorothiazide but it was discontinued by nephrology. Visit Vitals  /78 (BP 1 Location: Left arm, BP Patient Position: Sitting, BP Cuff Size: Adult)   Pulse 80   Temp 98.1 °F (36.7 °C) (Temporal)   Resp 16   Ht 6' 2\" (1.88 m)   Wt 205 lb (93 kg)   SpO2 97%   BMI 26.32 kg/m²       Review medication list, vitals, problem list,allergies.    Lab Results   Component Value Date/Time    WBC 7.6 01/07/2022 10:31 AM    HGB 12.7 (L) 01/07/2022 10:31 AM    HCT 38.2 01/07/2022 10:31 AM    PLATELET 373 55/55/4510 10:31 AM    MCV 93 01/07/2022 10:31 AM     Lab Results   Component Value Date/Time    Sodium 137 01/07/2022 10:31 AM    Potassium 5.0 01/07/2022 10:31 AM    Chloride 99 01/07/2022 10:31 AM    CO2 22 01/07/2022 10:31 AM    Glucose 96 01/07/2022 10:31 AM    BUN 50 (H) 01/07/2022 10:31 AM    Creatinine 1.91 (H) 01/07/2022 10:31 AM    BUN/Creatinine ratio 26 (H) 01/07/2022 10:31 AM    GFR est AA 42 (L) 01/07/2022 10:31 AM    GFR est non-AA 36 (L) 01/07/2022 10:31 AM    Calcium 10.7 (H) 01/07/2022 10:31 AM    Bilirubin, total 0.5 01/07/2022 10:31 AM    Alk. phosphatase 55 01/07/2022 10:31 AM    Protein, total 7.0 01/07/2022 10:31 AM    Albumin 4.5 01/07/2022 10:31 AM    ALT (SGPT) 14 01/07/2022 10:31 AM    AST (SGOT) 12 01/07/2022 10:31 AM     Lab Results   Component Value Date/Time    Cholesterol, total 224 (H) 01/07/2022 10:31 AM    HDL Cholesterol 50 01/07/2022 10:31 AM    LDL, calculated 151 (H) 01/07/2022 10:31 AM    VLDL, calculated 23 01/07/2022 10:31 AM    Triglyceride 128 01/07/2022 10:31 AM     Lab Results   Component Value Date/Time    TSH 3.480 01/07/2022 10:31 AM     ROS: See HPI    Physical Exam  Constitutional:       General: He is not in acute distress. Cardiovascular:      Rate and Rhythm: Normal rate and regular rhythm. Heart sounds: Normal heart sounds. Abdominal:      General: Bowel sounds are normal.      Palpations: Abdomen is soft. Tenderness: There is no abdominal tenderness. Musculoskeletal:      Cervical back: Neck supple. Neurological:      General: No focal deficit present. Mental Status: He is oriented to person, place, and time. Psychiatric:         Behavior: Behavior normal.         ASSESSMENT and PLAN    ICD-10-CM ICD-9-CM    1. Aneurysm [facility]): Post surgery and post  shunt. Following neurosurgery recommendation. No concern at this time. Vitals been stable I72.9 442.9    2. Primary hypertension: Currently blood pressure is stable. Hydrochlorothiazide been discontinued by nephrologist. I10 401.9    3. History of recent pneumonia  Z87.01 V12.61    4. S/P  shunt  Z98.2 V45.2    5. Renal insufficiency: Avoiding NSAIDs.   Following nephrology and the recommendation: Following Dr. Perez Captain. Concern of non-ST elevation MI but happened to be elevated troponin due to aneurysm. Will follow cardiology and the recommendation. He is on baby aspirin N28.9 593.9    6. Systolic dysfunction  I50.2 429.9     also elevated troponin and NSTEMI but thought secondary to aneurysm. for now seen cardiology. bradycardia. thought to start entresto but due to renal insufficie   7. Dyslipidemia: Diet modification. E78.5 272.4    8. Arthralgia, unspecified joint: On and off joint pain over the hand joints, knee pain and ankle pain. No swollen or redness of the joint. At this time I have advised Tylenol as needed. If no improvement will consider further labs and work-up M25.50 719.40    9. Screening for colon cancer  Z12.11 V76.51 OCCULT BLOOD IMMUNOASSAY,DIAGNOSTIC   Patient understood agreed with the plan  Has taken Covid shot but waiting for booster. Will consider doing vaccination discussion next visit  Follow-up and Dispositions    · Return in about 4 months (around 6/2/2022). Please note that this dictation was completed with Medigo, the [employer] voice recognition software. Quite often unanticipated grammatical, syntax, homophones, and other interpretive errors are inadvertently transcribed by the computer software. Please disregard these errors. Please excuse any errors that have escaped final proofreading.

## 2022-02-02 NOTE — PROGRESS NOTES
Chief Complaint   Patient presents with    Hypertension    Other     H/O aneurysm    Other      shunt     1. \"Have you been to the ER, urgent care clinic since your last visit? Hospitalized since your last visit? \" No    2. \"Have you seen or consulted any other health care providers outside of the [address] [place] [address] since your last visit? \" Dr. [facility], Dr. Saúl Pollock- Neurosurgeon     3. For patients aged 39-70: Has the patient had a colonoscopy / FIT/ Cologuard?  No

## 2022-02-09 ENCOUNTER — TELEPHONE (OUTPATIENT)
Dept: CARDIOLOGY CLINIC | Age: 67
End: 2022-02-09

## 2022-02-09 DIAGNOSIS — I10 PRIMARY HYPERTENSION: Primary | ICD-10-CM

## 2022-02-09 LAB
BUN SERPL-MCNC: 32 MG/DL (ref 8–27)
BUN/CREAT SERPL: 23 (ref 10–24)
CALCIUM SERPL-MCNC: 9.6 MG/DL (ref 8.6–10.2)
CHLORIDE SERPL-SCNC: 104 MMOL/L (ref 96–106)
CO2 SERPL-SCNC: 24 MMOL/L (ref 20–29)
CREAT SERPL-MCNC: 1.41 MG/DL (ref 0.76–1.27)
GLUCOSE SERPL-MCNC: 93 MG/DL (ref 65–99)
POTASSIUM SERPL-SCNC: 4.4 MMOL/L (ref 3.5–5.2)
SODIUM SERPL-SCNC: 143 MMOL/L (ref 134–144)

## 2022-02-09 NOTE — PROGRESS NOTES
Please contact patient and do the following asap  Check if patient has any edema. If not, he should continue to drink water and not take any Lasix or other diuretics. Let the labs be repeated 1 day before he sees me preferably so I can start more medications when I see him next week  Rpt BMP  in 7 days

## 2022-02-09 NOTE — TELEPHONE ENCOUNTER
Left message for pt to return call regarding edema, advised to not take any Lasix or other diuretics and to get repeat blood work done day prior to appointment on 2/15.

## 2022-02-09 NOTE — TELEPHONE ENCOUNTER
----- Message from Thompson Naik MD sent at 2/9/2022  2:17 PM EST -----  Please contact patient and do the following asap  Check if patient has any edema. If not, he should continue to drink water and not take any Lasix or other diuretics. Let the labs be repeated 1 day before he sees me preferably so I can start more medications when I see him next week  Rpt BMP  in 7 days

## 2022-02-10 DIAGNOSIS — I10 PRIMARY HYPERTENSION: ICD-10-CM

## 2022-02-10 NOTE — TELEPHONE ENCOUNTER
Pt returned call, states does not have edema. Advised to continue to drink water and not take Lasix or other diuretics and have labs repeated day prior to appointment. Pt understood and had no questions or concerns.

## 2022-02-16 ENCOUNTER — OFFICE VISIT (OUTPATIENT)
Dept: CARDIOLOGY CLINIC | Age: 67
End: 2022-02-16
Payer: COMMERCIAL

## 2022-02-16 VITALS
HEIGHT: 74 IN | SYSTOLIC BLOOD PRESSURE: 107 MMHG | HEART RATE: 70 BPM | BODY MASS INDEX: 27.08 KG/M2 | OXYGEN SATURATION: 98 % | WEIGHT: 211 LBS | DIASTOLIC BLOOD PRESSURE: 79 MMHG

## 2022-02-16 DIAGNOSIS — I50.22 CHRONIC SYSTOLIC CONGESTIVE HEART FAILURE (HCC): Primary | ICD-10-CM

## 2022-02-16 DIAGNOSIS — Z98.2 S/P VP SHUNT: ICD-10-CM

## 2022-02-16 DIAGNOSIS — I10 PRIMARY HYPERTENSION: ICD-10-CM

## 2022-02-16 DIAGNOSIS — I35.1 NONRHEUMATIC AORTIC VALVE INSUFFICIENCY: ICD-10-CM

## 2022-02-16 DIAGNOSIS — I42.9 CARDIOMYOPATHY, UNSPECIFIED TYPE (HCC): ICD-10-CM

## 2022-02-16 LAB
BUN SERPL-MCNC: 29 MG/DL (ref 8–27)
BUN/CREAT SERPL: 22 (ref 10–24)
CALCIUM SERPL-MCNC: 9.8 MG/DL (ref 8.6–10.2)
CHLORIDE SERPL-SCNC: 103 MMOL/L (ref 96–106)
CO2 SERPL-SCNC: 22 MMOL/L (ref 20–29)
CREAT SERPL-MCNC: 1.31 MG/DL (ref 0.76–1.27)
GLUCOSE SERPL-MCNC: 94 MG/DL (ref 65–99)
POTASSIUM SERPL-SCNC: 4.9 MMOL/L (ref 3.5–5.2)
SODIUM SERPL-SCNC: 139 MMOL/L (ref 134–144)

## 2022-02-16 PROCEDURE — 99214 OFFICE O/P EST MOD 30 MIN: CPT | Performed by: INTERNAL MEDICINE

## 2022-02-16 RX ORDER — SACUBITRIL AND VALSARTAN 24; 26 MG/1; MG/1
1 TABLET, FILM COATED ORAL 2 TIMES DAILY
Qty: 60 TABLET | Refills: 1 | Status: SHIPPED
Start: 2022-02-16 | End: 2022-03-08 | Stop reason: DRUGHIGH

## 2022-02-16 RX ORDER — AMLODIPINE BESYLATE 2.5 MG/1
2.5 TABLET ORAL DAILY
Qty: 90 TABLET | Refills: 1 | Status: SHIPPED
Start: 2022-02-16 | End: 2022-05-02

## 2022-02-16 NOTE — PROGRESS NOTES
HISTORY OF PRESENT ILLNESS  Carmen Phoenix is a 61 y.o. male. 1/22 seen as a new patient for cardiomyopathy. Noted after subarachnoid hemorrhage admission in 9/21 at [facility].  He also had aneurysm of [place] of [place] and was stented for that. Patient denies significant chest pain, SOB, palpitations, dizziness      Leg Swelling  The history is provided by the patient. This is a new problem. The current episode started more than 1 week ago (10/21). The problem occurs every several days. The problem has been rapidly improving. Pertinent negatives include no chest pain, no headaches and no shortness of breath. Nothing aggravates the symptoms. Follow-up  Pertinent negatives include no chest pain, no headaches and no shortness of breath. Review of Systems   Constitutional: Negative for chills, fever, malaise/fatigue and weight loss. HENT: Negative for nosebleeds. Eyes: Negative for discharge. Respiratory: Negative for cough, shortness of breath and wheezing. Cardiovascular: Positive for leg swelling. Negative for chest pain, palpitations, orthopnea, claudication and PND. Gastrointestinal: Negative for diarrhea, nausea and vomiting. Genitourinary: Negative for dysuria and hematuria. Musculoskeletal: Negative for joint pain. Skin: Negative for rash. Neurological: Negative for dizziness, seizures, loss of consciousness and headaches. Endo/Heme/Allergies: Negative for polydipsia. Does not bruise/bleed easily. Psychiatric/Behavioral: Negative for depression and substance abuse. The patient does not have insomnia.       9/21 echo   Narrative    CONCLUSIONS     * Left ventricular systolic function is moderately reduced with an ejection   fraction of 34 % by Gao's biplane.     * Diastolic dysfunction is indeterminate but has characteristics of Grade   III (severe) diastolic dysfunction.     * There is severely increased left ventricular wall thickness.     * Right ventricular systolic function is reduced with TAPSE measuring 1.55   cm.     * There is mild diffuse thickening (sclerosis) of the aortic valve cusps.     * There is mild aortic valve regurgitation.     * Estimated pulmonary arterial systolic pressure is 23 mmHg.     * The aortic root at the sinus of Valsalva is dilated measuring 4.0 cm with   an index of 1.78 cm/m2.     * No evidence of shunt at atrial level by 2D, color flow and negative bubble   study. Comparison     * No prior study is available for comparison. 11/21 nuclear stress test  CONCLUSIONS     * No definite pathologic perfusion defects are seen. Study is negative for   definite infarct or ischemia.     * The left ventricle is dilated on both stress and rest images. There is   generalized hypokinesis with a calculated LVEF=33%.     * Findings are most c/w a nonischemic cardiomyopathy. Physical Exam  Constitutional:       General: He is not in acute distress. Appearance: He is well-developed. HENT:      Head: Normocephalic and atraumatic. Mouth/Throat:      Dentition: Normal dentition. Eyes:      General: No scleral icterus. Right eye: No discharge. Left eye: No discharge. Neck:      Thyroid: No thyromegaly. Vascular: No carotid bruit or JVD. Cardiovascular:      Rate and Rhythm: Normal rate and regular rhythm. Pulses: Intact distal pulses. Heart sounds: S1 normal and S2 normal. Murmur heard. Blowing midsystolic murmur is present with a grade of 2/6 at the upper right sternal border. No friction rub. No gallop. Pulmonary:      Effort: Pulmonary effort is normal.      Breath sounds: Normal breath sounds. No wheezing or rales. Abdominal:      Palpations: Abdomen is soft. There is no mass. Tenderness: There is no abdominal tenderness. Musculoskeletal:      Cervical back: Neck supple. Right lower leg: Edema (Trace) present. Left lower leg: Edema (Trace) present. Lymphadenopathy:      Cervical:      Right cervical: No superficial cervical adenopathy. Left cervical: No superficial cervical adenopathy. Skin:     General: Skin is warm and dry. Findings: No rash. Neurological:      Mental Status: He is alert and oriented to person, place, and time. Psychiatric:         Behavior: Behavior normal.         ASSESSMENT and PLAN      1/5/2022 cardiomyopathy is likely nonischemic as the stress test was normal.  Blood pressure is treated well and controlled now. He does have bradycardia frequently at home and so not a good candidate for beta-blockers. Would like to use Entresto but will need labs specially the renal function and potassium before changing lisinopril. Labs as ordered. Aortic incompetence is mild and will be followed clinically. Diagnoses and all orders for this visit:    1. Chronic systolic congestive heart failure ([facility] [address]    2. Primary hypertension    3. [facility]  -     amLODIPine (NORVASC) 2.5 mg tablet; Take 1 Tablet by mouth daily. -     sacubitriL-valsartan (Entresto) 24-26 mg tablet; Take 1 Tablet by mouth two (2) times a day. -     empagliflozin (Jardiance) 10 mg tablet; Take 1 Tablet by mouth daily.  -     ECHO ADULT COMPLETE; Future  -     METABOLIC PANEL, BASIC; Future    4. Nonrheumatic aortic valve insufficiency    5. S/P  shunt    Other orders  -     Comp Stocking,Knee,Regular,Sml misc; 2 Box by Does Not Apply route daily. Use while upright  Indications: edema        Pertinent laboratory and test data reviewed and discussed with patient.   See patient instructions also for other medical advice given    Medications Discontinued During This Encounter   Medication Reason    lisinopriL (PRINIVIL, ZESTRIL) 5 mg tablet Alternate Therapy    amLODIPine (NORVASC) 5 mg tablet        Follow-up and Dispositions    · Return in about 6 weeks (around 3/30/2022), or if symptoms worsen or fail to improve, for post test, BP log x 4-5 days post med changes. 2/16/2022 CHF is compensated with trace edema around the ankles. Renal function is significantly improved since diuretics held. Reduce Norvasc to 2.5 a day. Change lisinopril to Entresto. Follow home BP chart and BMP. Compression stockings for edema. Add Jardiance for cardiomyopathy. Future echo in a few months but will try to titrate the medications in 6 weeks. Murmur for aortic insufficiency persists.

## 2022-02-16 NOTE — PROGRESS NOTES
1. Have you been to the ER, urgent care clinic since your last visit? Hospitalized since your last visit?     no    2. Have you seen or consulted any other health care providers outside of the [address] [place] [address] since your last visit? Include any pap smears or colon screening. Yes/nephro      3. Since your last visit, have you had any of the following symptoms?      no      Explain:no    4. Have you had any blood work, X-rays or cardiac testing? yes     Requested: YES     [facility]    5. Where do you normally have your labs drawn? [facility]    6. Do you need any refills today?    no

## 2022-02-16 NOTE — PATIENT INSTRUCTIONS
Medications Discontinued During This Encounter   Medication Reason    lisinopriL (PRINIVIL, ZESTRIL) 5 mg tablet Alternate Therapy    amLODIPine (NORVASC) 5 mg tablet      After the recommended changes have been made in blood pressure medicines, patient advised to keep BP/HR(pulse rate) chart twice daily and bring us results in next 4 to 5 days. Patient may send the results via \"My Chart\" if desired. Please rest for 5-10 minutes before checking blood pressure. Sit on a comfortable chair without crossing the legs and put your arm on a table. We recommend that you use an upper arm cuff. Check the blood pressure 3 times each time you check the blood pressure and record the lowest reading. If you check the blood pressure in both arms, use the higher reading. Avoiding Triggers With Heart Failure: Care Instructions  Your Care Instructions     Triggers are anything that make your heart failure flare up. A flare-up is also called \"sudden heart failure\" or \"acute heart failure. \" When you have a flare-up, fluid builds up in your lungs, and you have problems breathing. You might need to go to the hospital. By watching for changes in your condition and avoiding triggers, you can prevent heart failure flare-ups. Follow-up care is a key part of your treatment and safety. Be sure to make and go to all appointments, and call your doctor if you are having problems. It's also a good idea to know your test results and keep a list of the medicines you take. How can you care for yourself at home? Watch for changes in your weight and condition  · Weigh yourself without clothing at the same time each day. Record your weight. Call your doctor if you have sudden weight gain, such as more than 2 to 3 pounds in a day or 5 pounds in a week. (Your doctor may suggest a different range of weight gain.) A sudden weight gain may mean that your heart failure is getting worse. · Keep a daily record of your symptoms.  Write down any changes in how you feel, such as new shortness of breath, cough, or problems eating. Also record if your ankles are more swollen than usual and if you feel more tired than usual. Note anything that you ate or did that could have triggered these changes. Limit sodium  Sodium causes your body to hold on to extra water. This may cause your heart failure symptoms to get worse. People get most of their sodium from processed foods. Fast food and restaurant meals also tend to be very high in sodium. · Your doctor may suggest that you limit sodium. Your doctor can tell you how much sodium is right for you. This includes limiting sodium in cooked and packaged foods. · Read food labels on cans and food packages. They tell you how much sodium you get in one serving. Check the serving size. If you eat more than one serving, you are getting more sodium. · Be aware that sodium can come in forms other than salt, including monosodium glutamate (MSG), sodium citrate, and sodium bicarbonate (baking soda). MSG is often added to Asian food. You can sometimes ask for food without MSG or salt. · Slowly reducing salt will help you adjust to the taste. Take the salt shaker off the table. · Flavor your food with garlic, lemon juice, onion, vinegar, herbs, and spices instead of salt. Do not use soy sauce, steak sauce, onion salt, garlic salt, mustard, or ketchup on your food, unless it is labeled \"low-sodium\" or \"low-salt. \"  · Make your own salad dressings, sauces, and ketchup without adding salt. · Use fresh or frozen ingredients, instead of canned ones, whenever you can. Choose low-sodium canned goods. · Eat less processed food and food from restaurants, including fast food. Exercise as directed  Moderate, regular exercise is very good for your heart. It improves your blood flow and helps control your weight. But too much exercise can stress your heart and cause a heart failure flare-up.   · Check with your doctor before you start an exercise program.  · Walking is an easy way to get exercise. Start out slowly. Gradually increase the length and pace of your walk. Swimming, riding a bike, and using a treadmill are also good forms of exercise. · When you exercise, watch for signs that your heart is working too hard. You are pushing yourself too hard if you cannot talk while you are exercising. If you become short of breath or dizzy or have chest pain, stop, sit down, and rest.  · Do not exercise when you do not feel well. Take medicines correctly  · Take your medicines exactly as prescribed. Call your doctor if you think you are having a problem with your medicine. · Make a list of all the medicines you take. Include those prescribed to you by other doctors and any over-the-counter medicines, vitamins, or supplements you take. Take this list with you when you go to any doctor. · Take your medicines at the same time every day. It may help you to post a list of all the medicines you take every day and what time of day you take them. · Make taking your medicine as simple as you can. Plan times to take your medicines when you are doing other things, such as eating a meal or getting ready for bed. This will make it easier to remember to take your medicines. · Get organized. Use helpful tools, such as daily or weekly pill containers. When should you call for help? Call 911  if you have symptoms of sudden heart failure such as:    · You have severe trouble breathing.     · You cough up pink, foamy mucus.     · You have a new irregular or rapid heartbeat. Call your doctor now or seek immediate medical care if:    · You have new or increased shortness of breath.     · You are dizzy or lightheaded, or you feel like you may faint.     · You have sudden weight gain, such as more than 2 to 3 pounds in a day or 5 pounds in a week.  (Your doctor may suggest a different range of weight gain.)     · You have increased swelling in your legs, ankles, or feet.     · You are suddenly so tired or weak that you cannot do your usual activities. Watch closely for changes in your health, and be sure to contact your doctor if you develop new symptoms. Where can you learn more? Go to http://www.gray.com/  Enter V089 in the search box to learn more about \"Avoiding Triggers With Heart Failure: Care Instructions. \"  Current as of: April 29, 2021               Content Version: 13.0  © 2006-2021 Healthwise, Incorporated. Care instructions adapted under license by [employer] (which disclaims liability or warranty for this information). If you have questions about a medical condition or this instruction, always ask your healthcare professional. [address] any warranty or liability for your use of this information.

## 2022-02-23 ENCOUNTER — TELEPHONE (OUTPATIENT)
Dept: CARDIOLOGY CLINIC | Age: 67
End: 2022-02-23

## 2022-02-23 NOTE — TELEPHONE ENCOUNTER
Spoke with patient insurance and patient needs PA for medication. PA was done on Cover my meds 3-5 days turn around for approval.

## 2022-02-23 NOTE — TELEPHONE ENCOUNTER
You started patient on Jardiance when seen on 2/16/22. Patient states his insurance will not cover it.

## 2022-02-24 NOTE — TELEPHONE ENCOUNTER
Patient called today and stated hid feet is still swollen no change from when he was in office to see you. Patient also states he hasn't gain any weight and he doesn't eat a lot of sodium.  Please advise

## 2022-02-24 NOTE — TELEPHONE ENCOUNTER
Spoke to patient per Dr. Yeimi Brown. It is okay as long as edema isn't increasing. Still need his home BP chart. He voices understanding and acceptance of this advice and will call back if any further questions or concerns.

## 2022-03-08 DIAGNOSIS — I42.9 CARDIOMYOPATHY, UNSPECIFIED TYPE (HCC): ICD-10-CM

## 2022-03-08 RX ORDER — SACUBITRIL AND VALSARTAN 49; 51 MG/1; MG/1
1 TABLET, FILM COATED ORAL 2 TIMES DAILY
Qty: 60 TABLET | Refills: 6 | Status: SHIPPED | OUTPATIENT
Start: 2022-03-08 | End: 2022-05-18 | Stop reason: SDUPTHER

## 2022-03-08 NOTE — TELEPHONE ENCOUNTER
Dr Lola Henley reviewed patients BP log and would like for patient to stop Norvasc and increase Entresto 49/51 BID and have a BMP in 1-2 days. Spoke with patient and he is aware of medication changes.

## 2022-03-11 DIAGNOSIS — I42.9 CARDIOMYOPATHY, UNSPECIFIED TYPE (HCC): ICD-10-CM

## 2022-03-14 ENCOUNTER — TELEPHONE (OUTPATIENT)
Dept: CARDIOLOGY CLINIC | Age: 67
End: 2022-03-14

## 2022-03-14 NOTE — TELEPHONE ENCOUNTER
Patient entresto cost him 306.29 sent for a 30 day supply and he states he can't afford this. Can try patient assistant for patient.  Please advise

## 2022-03-18 PROBLEM — I35.1 NONRHEUMATIC AORTIC VALVE INSUFFICIENCY: Status: ACTIVE | Noted: 2022-02-16

## 2022-03-19 PROBLEM — I21.4 NON-ST ELEVATION MI (NSTEMI) (HCC): Status: ACTIVE | Noted: 2021-11-17

## 2022-03-19 PROBLEM — I10 PRIMARY HYPERTENSION: Status: ACTIVE | Noted: 2021-11-17

## 2022-03-19 PROBLEM — I72.9 ANEURYSM (HCC): Status: ACTIVE | Noted: 2021-11-17

## 2022-03-19 PROBLEM — Z98.2 S/P VP SHUNT: Status: ACTIVE | Noted: 2021-11-17

## 2022-03-19 PROBLEM — N28.9 RENAL INSUFFICIENCY: Status: ACTIVE | Noted: 2021-11-17

## 2022-03-19 PROBLEM — I51.9 SYSTOLIC DYSFUNCTION: Status: ACTIVE | Noted: 2021-11-17

## 2022-03-19 PROBLEM — Z87.01 HISTORY OF RECENT PNEUMONIA: Status: ACTIVE | Noted: 2021-11-17

## 2022-03-22 LAB
BUN SERPL-MCNC: 36 MG/DL (ref 8–27)
BUN/CREAT SERPL: 23 (ref 10–24)
CALCIUM SERPL-MCNC: 9.4 MG/DL (ref 8.6–10.2)
CHLORIDE SERPL-SCNC: 105 MMOL/L (ref 96–106)
CO2 SERPL-SCNC: 17 MMOL/L (ref 20–29)
CREAT SERPL-MCNC: 1.54 MG/DL (ref 0.76–1.27)
EGFR: 50 ML/MIN/1.73
GLUCOSE SERPL-MCNC: 94 MG/DL (ref 65–99)
POTASSIUM SERPL-SCNC: 4.7 MMOL/L (ref 3.5–5.2)
SODIUM SERPL-SCNC: 141 MMOL/L (ref 134–144)

## 2022-04-05 RX ORDER — HYDRALAZINE HYDROCHLORIDE 50 MG/1
50 TABLET, FILM COATED ORAL 3 TIMES DAILY
Qty: 270 TABLET | Refills: 1 | Status: SHIPPED | OUTPATIENT
Start: 2022-04-05 | End: 2022-05-05

## 2022-04-27 ENCOUNTER — TELEPHONE (OUTPATIENT)
Dept: CARDIOLOGY CLINIC | Age: 67
End: 2022-04-27

## 2022-04-28 NOTE — TELEPHONE ENCOUNTER
----- Message from Levon Ibrahim sent at 4/26/2022  9:41 AM EDT -----  Subject: Referral Request    QUESTIONS   Reason for referral request? Patient is needing a referral for vascular   surgeon Dr. Raad Jasso or Dr. Ghassan Mari Vascular specialist on [facility]   Has the physician seen you for this condition before? No   Preferred Specialist (if applicable)? Do you already have an appointment scheduled? No  Additional Information for Provider?   ---------------------------------------------------------------------------  --------------  CALL BACK INFO  What is the best way for the office to contact you? OK to leave message on   voicemail  Preferred Call Back Phone Number? [phone]  ---------------------------------------------------------------------------  --------------  SCRIPT ANSWERS  Relationship to Patient?  Self

## 2022-04-28 NOTE — TELEPHONE ENCOUNTER
Dr. Charisma Dye, patient is requesting a referral to vascular surgery. [address] Admission on 4/21/22 - 4/24/22 for CVA.

## 2022-04-29 ENCOUNTER — TELEPHONE (OUTPATIENT)
Dept: CARDIOLOGY CLINIC | Age: 67
End: 2022-04-29

## 2022-04-29 DIAGNOSIS — I10 PRIMARY HYPERTENSION: Primary | ICD-10-CM

## 2022-04-29 DIAGNOSIS — I50.22 CHRONIC SYSTOLIC CONGESTIVE HEART FAILURE (HCC): ICD-10-CM

## 2022-04-30 ENCOUNTER — TELEPHONE (OUTPATIENT)
Dept: FAMILY MEDICINE CLINIC | Age: 67
End: 2022-04-30

## 2022-04-30 NOTE — TELEPHONE ENCOUNTER
----- Message from Colonel Crigler sent at 4/26/2022  9:41 AM EDT -----  Subject: Referral Request    QUESTIONS   Reason for referral request? Patient is needing a referral for vascular   surgeon Dr. Alexander Mays or Dr. Janie Adams Vascular specialist on [facility]   Has the physician seen you for this condition before? No   Preferred Specialist (if applicable)? Do you already have an appointment scheduled? No  Additional Information for Provider?   ---------------------------------------------------------------------------  --------------  CALL BACK INFO  What is the best way for the office to contact you? OK to leave message on   voicemail  Preferred Call Back Phone Number? [phone]  ---------------------------------------------------------------------------  --------------  SCRIPT ANSWERS  Relationship to Patient?  Self

## 2022-04-30 NOTE — TELEPHONE ENCOUNTER
Spoke with Mr. Oswaldo Jarvis whom stated he has an appointment with Dr. Hamilton Del Toro for hospital follow for stroke. Aware referral for vascular will be address at his appointment on Monday May 02,2022 with Dr. Hamilton Del Toro.

## 2022-05-02 ENCOUNTER — HOSPITAL ENCOUNTER (OUTPATIENT)
Dept: GENERAL RADIOLOGY | Age: 67
Discharge: HOME | End: 2022-05-02
Payer: MEDICARE

## 2022-05-02 ENCOUNTER — OFFICE VISIT (OUTPATIENT)
Dept: FAMILY MEDICINE CLINIC | Age: 67
End: 2022-05-02
Payer: MEDICARE

## 2022-05-02 ENCOUNTER — TELEPHONE (OUTPATIENT)
Dept: FAMILY MEDICINE CLINIC | Age: 67
End: 2022-05-02

## 2022-05-02 VITALS
OXYGEN SATURATION: 95 % | HEART RATE: 76 BPM | RESPIRATION RATE: 16 BRPM | SYSTOLIC BLOOD PRESSURE: 108 MMHG | DIASTOLIC BLOOD PRESSURE: 66 MMHG | TEMPERATURE: 97.7 F

## 2022-05-02 DIAGNOSIS — M25.512 LEFT SHOULDER PAIN, UNSPECIFIED CHRONICITY: ICD-10-CM

## 2022-05-02 DIAGNOSIS — I72.4 BILATERAL POPLITEAL ARTERY ANEURYSM (HCC): ICD-10-CM

## 2022-05-02 DIAGNOSIS — I63.9 ACUTE CVA (CEREBROVASCULAR ACCIDENT) (HCC): Primary | ICD-10-CM

## 2022-05-02 PROCEDURE — G8510 SCR DEP NEG, NO PLAN REQD: HCPCS | Performed by: FAMILY MEDICINE

## 2022-05-02 PROCEDURE — G8419 CALC BMI OUT NRM PARAM NOF/U: HCPCS | Performed by: FAMILY MEDICINE

## 2022-05-02 PROCEDURE — 1101F PT FALLS ASSESS-DOCD LE1/YR: CPT | Performed by: FAMILY MEDICINE

## 2022-05-02 PROCEDURE — G8427 DOCREV CUR MEDS BY ELIG CLIN: HCPCS | Performed by: FAMILY MEDICINE

## 2022-05-02 PROCEDURE — G8752 SYS BP LESS 140: HCPCS | Performed by: FAMILY MEDICINE

## 2022-05-02 PROCEDURE — 3017F COLORECTAL CA SCREEN DOC REV: CPT | Performed by: FAMILY MEDICINE

## 2022-05-02 PROCEDURE — G8754 DIAS BP LESS 90: HCPCS | Performed by: FAMILY MEDICINE

## 2022-05-02 PROCEDURE — 99214 OFFICE O/P EST MOD 30 MIN: CPT | Performed by: FAMILY MEDICINE

## 2022-05-02 PROCEDURE — 73030 X-RAY EXAM OF SHOULDER: CPT

## 2022-05-02 PROCEDURE — G8536 NO DOC ELDER MAL SCRN: HCPCS | Performed by: FAMILY MEDICINE

## 2022-05-02 RX ORDER — FAMOTIDINE, CALCIUM CARBONATE, AND MAGNESIUM HYDROXIDE 10; 800; 165 MG/1; MG/1; MG/1
1 TABLET, CHEWABLE ORAL
COMMUNITY

## 2022-05-02 RX ORDER — ATORVASTATIN CALCIUM 20 MG/1
20 TABLET, FILM COATED ORAL
COMMUNITY
Start: 2022-04-24 | End: 2022-05-03 | Stop reason: DRUGHIGH

## 2022-05-02 NOTE — TELEPHONE ENCOUNTER
Is the patient asking about Vascepa or it is an error. I do not think he needs to Vascepa unless he is already taking it.

## 2022-05-02 NOTE — PATIENT INSTRUCTIONS
Stroke: Care Instructions  Overview     A stroke is damage to the brain that occurs when a blood vessel in the brain bursts or is blocked by a blood clot. Without blood and the oxygen it carries, part of the brain is damaged. The part of your body controlled by that part of your brain may not function properly now. The brain is an amazing organ that can heal itself to some degree. The stroke you had damaged part of your brain. But other parts of your brain may take over in some way for the damaged areas. Your doctor will talk with you about what you can do to prevent another stroke. You can help by managing other health problems that raise your risk, such as atrial fibrillation or high blood pressure. Have a heart-healthy lifestyle which includes being active, eating healthy foods, staying at a healthy weight, and not smoking. You may also take medicine that prevents blood clots. Enter a stroke rehabilitation (rehab) program if your doctor recommends it. Stroke rehab is training and therapy to help you recover, prevent problems, and relearn how to do everyday things you have not been able to do since your stroke. The focus will depend on how the stroke has affected your ability to do the things you want and need to do. Follow-up care is a key part of your treatment and safety. Be sure to make and go to all appointments, and call your doctor if you are having problems. It's also a good idea to know your test results and keep a list of the medicines you take. How can you care for yourself at home?    · Attend stroke rehabilitation (rehab) if your doctor recommends it. Your rehab plan will be based on your goals and how the stroke affected you.     · You will get instructions on how to manage specific problems that you might have because of the stroke.     · Manage other health problems that raise your risk of another stroke.  These include atrial fibrillation, diabetes, high blood pressure, and high cholesterol.     · Have a heart-healthy lifestyle. ? Don't smoke and avoid secondhand smoke. ? Limit alcohol to 2 drinks a day for men and 1 drink a day for women. ? Stay at a healthy weight. Lose weight if you need to.  ? Be active. Ask your doctor what type and level of activity is safe for you.  ? Eat heart-healthy foods. These include vegetables, fruits, nuts, beans, lean meat, fish, and whole grains. Limit sodium and sugar.     · If you think you may have a problem with alcohol or drug use, talk to your doctor. Medicines    · Be safe with medicines. Take your medicines exactly as prescribed. Call your doctor if you think you are having a problem with your medicine. You will get more details on the specific medicines your doctor prescribes.     · You may take a few medicines to help lower your risk of another stroke. These include:  ? Blood pressure medicine such as an ACE (angiotensin-converting enzyme) inhibitor, angiotensin II receptor blocker (ARBs), or diuretic. ? Cholesterol medicine such as a statin. ? Aspirin or another blood thinner to prevent blood clots.     · If your doctor prescribed a blood thinner, be sure you get instructions about how to take your medicine safely. Blood thinners can cause serious bleeding problems.     · Do not take any over-the-counter medicines or herbal products without talking to your doctor first.     · If you take hormonal birth control or hormone therapy, talk to your doctor about whether they are right for you. They may raise the risk of stroke in some people. For caregivers    · Make the home safe. You may get advice from the stroke rehab team about what changes might be needed. Here are some examples. Set up a bedroom that does not require climbing stairs. Be sure the bathroom is on the same floor. Move throw rugs and furniture that could cause falls. Make sure that the lighting is good.  Put grab bars and seats in tubs and showers.     · Provide transportation until they can drive again.     · Find out what they can do and what they need help with. Try not to do things that they can do on their own. Help them learn and practice new skills.     · Visit and talk with them often. Try doing activities together that you both enjoy, such as playing cards or board games. Encourage other people to visit too.     · Take care of yourself. Here are some tips that might help. Do not try to do everything yourself. Ask the stroke rehab team for help. Ask friends and family members to help. Eat well, get enough rest, and take time to do things that you enjoy. Keep up with your own doctor visits, and make sure to take your medicines regularly. Join a local support group. Find out if you qualify for home health care visits to help with rehab or for adult day care. When should you call for help? Call 911 anytime you think you may need emergency care. For example, call if:    · You have signs of another stroke. These may include:  ? Sudden numbness, tingling, weakness, or loss of movement in your face, arm, or leg, especially on only one side of your body. ? Sudden vision changes. ? Sudden trouble speaking. ? Sudden confusion or trouble understanding simple statements. ? Sudden problems with walking or balance. ? A sudden, severe headache that is different from past headaches. ? Fainting. ? A seizure. Call 911 even if these symptoms go away in a few minutes. Call your doctor now or seek immediate medical care if:    · You have new symptoms that may be related to your stroke, such as falls or trouble swallowing. Watch and call if:    · You have been feeling sad, depressed, or hopeless, or you have lost interest in things that you usually enjoy.     · You have anxiety or fear that affects your life. Watch closely for changes in your health, and be sure to contact your doctor if you have any problems. Where can you learn more?   Go to http://www.gray.com/  Enter Z289 in the search box to learn more about \"Stroke: Care Instructions. \"  Current as of: July 6, 2021               Content Version: 13.2  © 2686-6835 Healthwise, Incorporated. Care instructions adapted under license by [employer] (which disclaims liability or warranty for this information). If you have questions about a medical condition or this instruction, always ask your healthcare professional. [place] [address] any warranty or liability for your use of this information.

## 2022-05-02 NOTE — TELEPHONE ENCOUNTER
Car Muniz from [facility] was calling about the referral she received from the [facility] and if Dr Paulino Quintero was going to be following the orders. I said that Dr Paulino Quintero had also done a referral for home care . So now Car Muniz would like to know if Dr Paulino Quintero would like the pt to use the hospital's referral or Dr Tr Marshall referral/ please advise.

## 2022-05-02 NOTE — PROGRESS NOTES
1. Have you been to the ER, urgent care clinic since your last visit? Hospitalized since your last visit? [facility] 4/21/22 - 4/24/22 for CVA    2. Have you seen or consulted any other health care providers outside of the [address] [place] [address] since your last visit? Include any pap smears or colon screening. No    Patient has upcoming appointment at [facility] on 5/12/22 with Lali Marshall. Also, patient is requesting a referral to vascular specialist at [facility] on [address]  in [place] with Dr. Silvano Stallworth.     Chief Complaint   Patient presents with   [address] 4/21/22 - 4/24/22 for CVA    Shoulder Pain     left shoulder pain x 3 weeks    Referral Request     vascular surgery - hospital US showed blood clots in both legs

## 2022-05-02 NOTE — PROGRESS NOTES
HISTORY OF PRESENT ILLNESS  Cornelia Draper is a 74 y.o. male. HPI: Here for follow-up after hospital discharge. Was admitted on April 21 and discharged on April 24. He had a sudden onset Dr. Sarabjit Maria side limp weakness and speech abnormality. Went to the hospital via 911. Was given tPA. His weakness on the right lower extremity has been improved. His speech has been improved. He was started on statin, full dose of aspirin 325 mg and will be started but he is Entresto. Vitals been stable. Taking his medications with compliance. Sitting in a wheelchair. Ambulating at home with the walker. Still has not received the home health he wanted someone to be forced discharge. Fall. Advised to use walker for safety and to prevent fall. During work-up at that hospital notes noted bilateral pulmonary. I agree aneurysm. Sending to vascular specialist.  Prior history of MCA aneurysm no embolization and subarachnoid hemorrhage. No recent fall or injury. Complaining of left shoulder pain and limited range of motion since 1 month. No tingling numbness or weakness on upper extremities. No facial weakness. Speech is clear. No trouble swallowing. Appetite is fair. Sleep is stable. No depression or anxiety. No urinary or bowel incontinence. Needing help with ADL. Did not appear in any acute distress during the visit. Visit Vitals  /66 (BP 1 Location: Left upper arm, BP Patient Position: Sitting, BP Cuff Size: Adult)   Pulse 76   Temp 97.7 °F (36.5 °C) (Temporal)   Resp 16   SpO2 95%     Current Outpatient Medications   Medication Sig Dispense Refill    atorvastatin (LIPITOR) 20 mg tablet Take 20 mg by mouth nightly.  Famotidine-Ca Carb-Mag Hydrox (Pepcid Complete) -165 mg chew Take 1 Tablet by mouth daily as needed.  hydrALAZINE (APRESOLINE) 50 mg tablet Take 1 Tablet by mouth three (3) times daily for 90 days.  270 Tablet 1    sacubitriL-valsartan (Entresto) 49-51 mg tab tablet Take 1 Tablet by mouth two (2) times a day. 60 Tablet 6    empagliflozin (Jardiance) 10 mg tablet Take 1 Tablet by mouth daily. 30 Tablet 6    thiamine HCL (B-1) 100 mg tablet Take  by mouth daily.  nitroglycerin (NITROSTAT) 0.4 mg SL tablet 0.4 mg by SubLINGual route.  aspirin (ASPIRIN) 325 mg tablet Take 325 mg by mouth daily.  acetaminophen (TylenoL) 325 mg tablet Take 325 mg by mouth every four (4) hours as needed for Pain.  Comp Stocking,Knee,Regular,Sml misc 2 Box by Does Not Apply route daily. Use while upright  Indications: edema (Patient not taking: Reported on 5/2/2022) 2 Each 0    melatonin 3 mg tablet Take 3 mg by mouth.  polyethylene glycol (MIRALAX) 17 gram packet Take 17 g by mouth daily. [address] [facility]  Impression  Performed by RADIOLOGY    1. Heterogeneous abnormal signal corresponding to the lumen of the right distal MCA aneurysm. Given the variable appearance on CT with variable adjacent edema suggested in the adjacent parenchyma, intermittent perinidal flow into the aneurysm lumen cannot be completely excluded, in the absence of prior MRI for comparison, but there was no demonstrable residual or recurrent flow suggested on the recent CTA. 2. Questionable subtle gyriform restricted diffusion along the medial aspect of the left parietal region and along the left frontoparietal convexity suggesting possible small areas of acute infarct. 3. Atrophy and chronic small vessel changes with multiple foci of old infarction. 4. Multiple foci of old hemorrhage in the cerebellum and colin, likely representing the sequela of uncontrolled hypertension. 5. 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 6. Additional findings, as noted above.    ECHOCARDIOGRAM COMPLETE  (DOPPLER ECHO)    Anatomical Region Laterality Modality   -- -- Color Flow Doppler Narrative  Performed by RADIOLOGY  CONCLUSIONS     * Left ventricular systolic function is normal with an ejection fraction of   70 % by Gao's biplane.     * There is concentric left ventricular hypertrophy with a mildly thickened   septal wall and mildly thickened posterior wall.     * Left ventricular diastolic function: indeterminate.     * Right ventricular systolic function is reduced with TAPSE measuring 1.58   cm.     * The prox ascending aorta is dilated measuring 4.2 cm with an index of 1.86   cm/m2.     * Unable to estimate pulmonary arterial pressure due to inadequate tricuspid   regurgitant Doppler waveforms.     * Patent foramen ovale (PFO) with trivial right to left shunting by agitated   saline. Comparison     * Previous Echo 9/29/2021: EF 34%. Severely increased Left ventricular wall   thickness. Aortic Root at the sinus of Valsalva is dilated measuring 4.0 cm   with an index of 1.78 cm/m2. CT HEAD W/O CONTRAST    Anatomical Region Laterality Modality   Head -- Computed Tomography       Impression  Performed by RADIOLOGY    Hyperdense focus adjacent to the right MCA stent is minimally increased in size since 4/21/2022 and now measures up to 1.5 cm, without significant mass effect. The size of this hyperdense focus is unchanged since 11/3/2021. No new intracranial hemorrhage. PVL VENOUS EXAM LE BILATERAL R/O DVT    Anatomical Region Laterality Modality   Lower Extremity -- Duplex Doppler       Narrative  Performed by RADIOLOGY  Bilateral: The deep venous system of the bilateral lower extremities was examined using duplex ultrasound. B-mode imaging demonstrates normal compressibility of the common femoral, femoral, deep femoral, popliteal, posterior tibial, and peroneal veins. There is no thrombus identified in the lower extremities. Doppler flow signals are spontaneous and phasic at all levels.    Right: Venous valvular reflux >1000 ms was identified in the right common femoral deep venous system. Incidental finding of thrombus vs soft plaque in the right popliteal artery. Left: Venous valvular reflux > 1000 ms was identified in the left common femoral deep venous system and > 500 ms in the left great saphenous vein at the saphenofemoral junction. Venous valvular reflux >500 ms was identified in the left peroneal deep venous system. Incidental finding of aneurysmal segment in the popliteal artery increasing from 0.814 cm in width to 1.32 cm. Conclusions: No evidence of deep venous thrombosis in the bilateral lower extremities. Venous valvular reflux bilaterally as described. Incidental findings in the bilateral popliteal arteries, as described in the findings. ROS: see HPI     Physical Exam  Constitutional:       General: He is not in acute distress. Cardiovascular:      Rate and Rhythm: Normal rate and regular rhythm. Heart sounds: Normal heart sounds. Abdominal:      General: Bowel sounds are normal.      Palpations: Abdomen is soft. Tenderness: There is no abdominal tenderness. Musculoskeletal:         General: No swelling. Cervical back: Neck supple. Comments: Left shoulder : ROM limited above 40 degree. Generalize tenderness    Neurological:      General: No focal deficit present. Mental Status: He is oriented to person, place, and time. Psychiatric:         Behavior: Behavior normal.         ASSESSMENT and PLAN    ICD-10-CM ICD-9-CM    1. Acute CVA (cerebrovascular accident) ([facility] [address] : post TPA. Weakness from rt lower ext improved at this time. Still need help with walking. PT/ OT. Has not received a referral yet. Sending a new referral. No speech abnormality or trouble swallowing. Walking with walker at home but much less than before. I63.9 434.91 [address] [employer] TO VASCULAR SURGERY   2. Left shoulder pain, unspecified chronicity : obtain x-ray. Limited ROM . Advise home PT  To address it as well.   M25.512 719.41 XR SHOULDER LT AP/LAT MIN 2 V   3. Bilateral popliteal artery aneurysm ([address] : during work up during hospitalization. Sending to vascular specialist.  I72.4 442.3 REFERRAL TO VASCULAR SURGERY   4. Tremor. \" will be following neurology. A1C was wnl. Pt and wife both understood and agree with the plan. Follow-up and Dispositions    · Return in about 3 months (around 8/2/2022). Please note that this dictation was completed with [employer], the computer voice recognition software. Quite often unanticipated grammatical, syntax, homophones, and other interpretive errors are inadvertently transcribed by the computer software. Please disregard these errors. Please excuse any errors that have escaped final proofreading.

## 2022-05-03 ENCOUNTER — HOME HEALTH ADMISSION (OUTPATIENT)
Dept: HOME HEALTH SERVICES | Facility: HOME HEALTH | Age: 67
End: 2022-05-03
Payer: MEDICARE

## 2022-05-03 RX ORDER — ATORVASTATIN CALCIUM 40 MG/1
40 TABLET, FILM COATED ORAL DAILY
COMMUNITY
End: 2022-05-05 | Stop reason: SDUPTHER

## 2022-05-03 NOTE — TELEPHONE ENCOUNTER
Spoke with Dr Anu Nation and she said that she would go with either home health could get to pt first and she would follow orders .  Closing encounter

## 2022-05-03 NOTE — TELEPHONE ENCOUNTER
Per lipids, 1/7/22; he will not need Vascepa. If he is ok , increase his lipitor to 40 mg/day.  Get lipids/LFTs in 4-6 weeks

## 2022-05-03 NOTE — TELEPHONE ENCOUNTER
Spoke to patient per Dr. Liyah Ruiz. Per Lipids 1/7/22, he will not need Vascepa. If he is ok, increase his lipitor to 40 mg/day. Get lipids /LFTs in 4 -6 weeks. He voices understanding and acceptance of this advice and will call back if any further questions or concerns.

## 2022-05-05 ENCOUNTER — HOME CARE VISIT (OUTPATIENT)
Dept: HOME HEALTH SERVICES | Facility: HOME HEALTH | Age: 67
End: 2022-05-05

## 2022-05-05 ENCOUNTER — OFFICE VISIT (OUTPATIENT)
Dept: CARDIOLOGY CLINIC | Age: 67
End: 2022-05-05
Payer: MEDICARE

## 2022-05-05 VITALS
HEIGHT: 74 IN | SYSTOLIC BLOOD PRESSURE: 109 MMHG | DIASTOLIC BLOOD PRESSURE: 75 MMHG | OXYGEN SATURATION: 96 % | HEART RATE: 79 BPM | BODY MASS INDEX: 27.09 KG/M2

## 2022-05-05 DIAGNOSIS — I10 PRIMARY HYPERTENSION: ICD-10-CM

## 2022-05-05 DIAGNOSIS — Z98.2 S/P VP SHUNT: ICD-10-CM

## 2022-05-05 DIAGNOSIS — E78.00 HYPERCHOLESTEREMIA: ICD-10-CM

## 2022-05-05 DIAGNOSIS — I50.22 CHRONIC SYSTOLIC CONGESTIVE HEART FAILURE (HCC): Primary | ICD-10-CM

## 2022-05-05 DIAGNOSIS — I35.1 NONRHEUMATIC AORTIC VALVE INSUFFICIENCY: ICD-10-CM

## 2022-05-05 DIAGNOSIS — Z86.73 HISTORY OF CVA (CEREBROVASCULAR ACCIDENT): ICD-10-CM

## 2022-05-05 PROCEDURE — G8427 DOCREV CUR MEDS BY ELIG CLIN: HCPCS | Performed by: INTERNAL MEDICINE

## 2022-05-05 PROCEDURE — G8419 CALC BMI OUT NRM PARAM NOF/U: HCPCS | Performed by: INTERNAL MEDICINE

## 2022-05-05 PROCEDURE — 3017F COLORECTAL CA SCREEN DOC REV: CPT | Performed by: INTERNAL MEDICINE

## 2022-05-05 PROCEDURE — G8536 NO DOC ELDER MAL SCRN: HCPCS | Performed by: INTERNAL MEDICINE

## 2022-05-05 PROCEDURE — 1101F PT FALLS ASSESS-DOCD LE1/YR: CPT | Performed by: INTERNAL MEDICINE

## 2022-05-05 PROCEDURE — 99214 OFFICE O/P EST MOD 30 MIN: CPT | Performed by: INTERNAL MEDICINE

## 2022-05-05 PROCEDURE — G8752 SYS BP LESS 140: HCPCS | Performed by: INTERNAL MEDICINE

## 2022-05-05 PROCEDURE — G8754 DIAS BP LESS 90: HCPCS | Performed by: INTERNAL MEDICINE

## 2022-05-05 PROCEDURE — G8432 DEP SCR NOT DOC, RNG: HCPCS | Performed by: INTERNAL MEDICINE

## 2022-05-05 RX ORDER — ATORVASTATIN CALCIUM 40 MG/1
40 TABLET, FILM COATED ORAL DAILY
Qty: 90 TABLET | Refills: 1 | Status: SHIPPED | OUTPATIENT
Start: 2022-05-05 | End: 2022-09-07 | Stop reason: SDUPTHER

## 2022-05-05 RX ORDER — ASPIRIN 81 MG/1
162 TABLET ORAL DAILY
Qty: 100 TABLET | Refills: 1 | Status: SHIPPED
Start: 2022-05-05 | End: 2022-08-02 | Stop reason: ALTCHOICE

## 2022-05-05 RX ORDER — HYDRALAZINE HYDROCHLORIDE 25 MG/1
25 TABLET, FILM COATED ORAL 3 TIMES DAILY
Qty: 270 TABLET | Refills: 1 | Status: SHIPPED | OUTPATIENT
Start: 2022-05-05 | End: 2022-08-03

## 2022-05-05 NOTE — PROGRESS NOTES
1. Have you been to the ER, urgent care clinic since your last visit? Hospitalized since your last visit? Yes When: 4/2022 Where: [facility] Reason for visit: CVA    2. Have you seen or consulted any other health care providers outside of the [address] [place] [address] since your last visit? Include any pap smears or colon screening. Yes Where: Dr. Ashley Hoffman Reason for visit: nephrologist, [facility]/ [facility] neurology/ vascular [facility]    3. Since your last visit, have you had any of the following symptoms? No    4. Have you had any blood work, X-rays or cardiac testing? Yes Reason for visit: MRI, CT Pb     Requested: NO     In [facility]: YES    5. Where do you normally have your labs drawn? [facility]    6. Do you need any refills today?    No

## 2022-05-05 NOTE — PATIENT INSTRUCTIONS
Medications Discontinued During This Encounter   Medication Reason    atorvastatin (Lipitor) 40 mg tablet REORDER    hydrALAZINE (APRESOLINE) 50 mg tablet     aspirin (ASPIRIN) 325 mg tablet DOSE ADJUSTMENT        Learning About the Mediterranean Diet  What is the Mediterranean diet? The Mediterranean diet is a style of eating rather than a diet plan. It features foods eaten in [place], [place], [place] and [place], and other countries along the [facility]. It emphasizes eating foods like fish, fruits, vegetables, beans, high-fiber breads and whole grains, nuts, and olive oil. This style of eating includes limited red meat, cheese, and sweets. Why choose the Mediterranean diet? A Mediterranean-style diet may improve heart health. It contains more fat than other heart-healthy diets. But the fats are mainly from nuts, unsaturated oils (such as fish oils and olive oil), and certain nut or seed oils (such as canola, soybean, or flaxseed oil). These fats may help protect the heart and blood vessels. How can you get started on the Mediterranean diet? Here are some things you can do to switch to a more Mediterranean way of eating. What to eat  · Eat a variety of fruits and vegetables each day, such as grapes, blueberries, tomatoes, broccoli, peppers, figs, olives, spinach, eggplant, beans, lentils, and chickpeas. · Eat a variety of whole-grain foods each day, such as oats, brown rice, and whole wheat bread, pasta, and couscous. · Eat fish at least 2 times a week. Try tuna, salmon, mackerel, lake trout, herring, or sardines. · Eat moderate amounts of low-fat dairy products, such as milk, cheese, or yogurt. · Eat moderate amounts of poultry and eggs. · Choose healthy (unsaturated) fats, such as nuts, olive oil, and certain nut or seed oils like canola, soybean, and flaxseed. · Limit unhealthy (saturated) fats, such as butter, palm oil, and coconut oil.  And limit fats found in animal products, such as meat and dairy products made with whole milk. Try to eat red meat only a few times a month in very small amounts. · Limit sweets and desserts to only a few times a week. This includes sugar-sweetened drinks like soda. The Mediterranean diet may also include red wine with your meal--1 glass each day for women and up to 2 glasses a day for men. Tips for eating at home  · Use herbs, spices, garlic, lemon zest, and citrus juice instead of salt to add flavor to foods. · Add avocado slices to your sandwich instead of morgan. · Have fish for lunch or dinner instead of red meat. Brush the fish with olive oil, and broil or grill it. · Sprinkle your salad with seeds or nuts instead of cheese. · Cook with olive or canola oil instead of butter or oils that are high in saturated fat. · Switch from 2% milk or whole milk to 1% or fat-free milk. · Dip raw vegetables in a vinaigrette dressing or hummus instead of dips made from mayonnaise or sour cream.  · Have a piece of fruit for dessert instead of a piece of cake. Try baked apples, or have some dried fruit. Tips for eating out  · Try broiled, grilled, baked, or poached fish instead of having it fried or breaded. · Ask your  to have your meals prepared with olive oil instead of butter. · Order dishes made with marinara sauce or sauces made from olive oil. Avoid sauces made from cream or mayonnaise. · Choose whole-grain breads, whole wheat pasta and pizza crust, brown rice, beans, and lentils. · Cut back on butter or margarine on bread. Instead, you can dip your bread in a small amount of olive oil. · Ask for a side salad or grilled vegetables instead of french fries or chips. Where can you learn more? Go to http://www.forman.com/  Enter O407 in the search box to learn more about \"Learning About the Mediterranean Diet. \"  Current as of: September 8, 2021               Content Version: 13.2  © 0473-8881 Healthwise, [facility].    Care instructions adapted under license by [employer] (which disclaims liability or warranty for this information). If you have questions about a medical condition or this instruction, always ask your healthcare professional. [address] any warranty or liability for your use of this information.

## 2022-05-05 NOTE — PROGRESS NOTES
HISTORY OF PRESENT ILLNESS  Maikol Middleton is a 74 y.o. male. 1/22 seen as a new patient for cardiomyopathy. Noted after subarachnoid hemorrhage admission in 9/21 at [facility].  He also had aneurysm of [place] of [place] and was stented for that. Patient denies significant chest pain, SOB, palpitations, dizziness      Follow-up  Pertinent negatives include no chest pain, no headaches and no shortness of breath. Leg Swelling  The history is provided by the patient. This is a new problem. The current episode started more than 1 week ago (10/21). The problem occurs every several days. The problem has been rapidly improving. Pertinent negatives include no chest pain, no headaches and no shortness of breath. Nothing aggravates the symptoms. Review of Systems   Constitutional: Negative for chills, fever, malaise/fatigue and weight loss. HENT: Negative for nosebleeds. Eyes: Negative for discharge. Respiratory: Negative for cough, shortness of breath and wheezing. Cardiovascular: Positive for leg swelling. Negative for chest pain, palpitations, orthopnea, claudication and PND. Gastrointestinal: Negative for diarrhea, nausea and vomiting. Genitourinary: Negative for dysuria and hematuria. Musculoskeletal: Negative for joint pain. Skin: Negative for rash. Neurological: Negative for dizziness, seizures, loss of consciousness and headaches. Endo/Heme/Allergies: Negative for polydipsia. Does not bruise/bleed easily. Psychiatric/Behavioral: Negative for depression and substance abuse. The patient does not have insomnia.       9/21 echo   Narrative    CONCLUSIONS     * Left ventricular systolic function is moderately reduced with an ejection   fraction of 34 % by Gao's biplane.     * Diastolic dysfunction is indeterminate but has characteristics of Grade   III (severe) diastolic dysfunction.     * There is severely increased left ventricular wall thickness.     * Right ventricular systolic function is reduced with TAPSE measuring 1.55   cm.     * There is mild diffuse thickening (sclerosis) of the aortic valve cusps.     * There is mild aortic valve regurgitation.     * Estimated pulmonary arterial systolic pressure is 23 mmHg.     * The aortic root at the sinus of Valsalva is dilated measuring 4.0 cm with   an index of 1.78 cm/m2.     * No evidence of shunt at atrial level by 2D, color flow and negative bubble   study. Comparison     * No prior study is available for comparison. 11/21 nuclear stress test  CONCLUSIONS     * No definite pathologic perfusion defects are seen. Study is negative for   definite infarct or ischemia.     * The left ventricle is dilated on both stress and rest images. There is   generalized hypokinesis with a calculated LVEF=33%.     * Findings are most c/w a nonischemic cardiomyopathy. 4/22 echo  CONCLUSIONS     * Left ventricular systolic function is normal with an ejection fraction of   70 % by Gao's biplane.     * There is concentric left ventricular hypertrophy with a mildly thickened   septal wall and mildly thickened posterior wall.     * Left ventricular diastolic function: indeterminate.     * Right ventricular systolic function is reduced with TAPSE measuring 1.58   cm.     * The prox ascending aorta is dilated measuring 4.2 cm with an index of 1.86   cm/m2.     * Unable to estimate pulmonary arterial pressure due to inadequate tricuspid   regurgitant Doppler waveforms.     * Patent foramen ovale (PFO) with trivial right to left shunting by agitated   saline. Comparison     * Previous Echo 9/29/2021: EF 34%. Severely increased Left ventricular wall   thickness. Aortic Root at the sinus of Valsalva is dilated measuring 4.0 cm   with an index of 1.78 cm/m2. Physical Exam  Constitutional:       General: He is not in acute distress. Appearance: He is well-developed. HENT:      Head: Normocephalic and atraumatic. Mouth/Throat:      Dentition: Normal dentition. Eyes:      General: No scleral icterus. Right eye: No discharge. Left eye: No discharge. Neck:      Thyroid: No thyromegaly. Vascular: No carotid bruit or JVD. Cardiovascular:      Rate and Rhythm: Normal rate and regular rhythm. Pulses: Intact distal pulses. Heart sounds: S1 normal and S2 normal. Murmur heard. Blowing midsystolic murmur is present with a grade of 2/6 at the upper right sternal border. No friction rub. No gallop. Pulmonary:      Effort: Pulmonary effort is normal.      Breath sounds: Normal breath sounds. No wheezing or rales. Abdominal:      Palpations: Abdomen is soft. There is no mass. Tenderness: There is no abdominal tenderness. Musculoskeletal:      Cervical back: Neck supple. Right lower leg: Edema (Trace) present. Left lower leg: Edema (Trace) present. Lymphadenopathy:      Cervical:      Right cervical: No superficial cervical adenopathy. Left cervical: No superficial cervical adenopathy. Skin:     General: Skin is warm and dry. Findings: No rash. Neurological:      Mental Status: He is alert and oriented to person, place, and time. Psychiatric:         Behavior: Behavior normal.         ASSESSMENT and PLAN    Aortic regurgitation: Mild 9/21, none reported/22   cardiomyopathy: EF 34% 9/21; 70% 4/22  Aortic ectasia: 9/21 ao root 4.0; 4/22 ASC ao 4.2 cm;    1/5/2022 cardiomyopathy is likely nonischemic as the stress test was normal.  Blood pressure is treated well and controlled now. He does have bradycardia frequently at home and so not a good candidate for beta-blockers. Would like to use Entresto but will need labs specially the renal function and potassium before changing lisinopril. Labs as ordered. Aortic incompetence is mild and will be followed clinically. 2/16/2022 CHF is compensated with trace edema around the ankles.   Renal function is significantly improved since diuretics held. Reduce Norvasc to 2.5 a day. Change lisinopril to Entresto. Follow home BP chart and BMP. Compression stockings for edema. Add Jardiance for cardiomyopathy. Future echo in a few months but will try to titrate the medications in 6 weeks. Murmur for aortic insufficiency persists. Diagnoses and all orders for this visit:    1. Chronic systolic congestive heart failure (HCC)  -     hydrALAZINE (APRESOLINE) 25 mg tablet; Take 1 Tablet by mouth three (3) times daily for 90 days. 2. Primary hypertension  -     LIPID PANEL; Future  -     HEPATIC FUNCTION PANEL; Future  -     METABOLIC PANEL, BASIC; Future  -     CBC W/O DIFF; Future  -     aspirin delayed-release 81 mg tablet; Take 2 Tablets by mouth daily. 3. Nonrheumatic aortic valve insufficiency    4. S/P  shunt    5. History of CVA (cerebrovascular accident)  Comments:  4/22 s/p TPA    6. Hypercholesteremia  -     LIPID PANEL; Future    Other orders  -     atorvastatin (Lipitor) 40 mg tablet; Take 1 Tablet by mouth daily. Pertinent laboratory and test data reviewed and discussed with patient. See patient instructions also for other medical advice given    Medications Discontinued During This Encounter   Medication Reason    atorvastatin (Lipitor) 40 mg tablet REORDER    hydrALAZINE (APRESOLINE) 50 mg tablet     aspirin (ASPIRIN) 325 mg tablet DOSE ADJUSTMENT       Follow-up and Dispositions    · Return in about 6 months (around 11/5/2022), or if symptoms worsen or fail to improve, for post test.       5/5/2022 CHF is compensated. Recent stroke status post tPA. Recovery is nearly complete to his previous baseline. Echo in the hospital at the time of stroke showed normal ejection fraction indicating recovered EF. Reduce hydralazine and let the blood pressure be slightly higher. Continue other medications  Lipitor increased to bring the LDL down further. Labs as ordered.   AI not reported in the recent echo which is also good news but will follow by serial echocardiograms when they are done for aortic ectasia.

## 2022-05-06 ENCOUNTER — HOME CARE VISIT (OUTPATIENT)
Dept: SCHEDULING | Facility: HOME HEALTH | Age: 67
End: 2022-05-06
Payer: MEDICARE

## 2022-05-06 VITALS
HEART RATE: 78 BPM | SYSTOLIC BLOOD PRESSURE: 130 MMHG | OXYGEN SATURATION: 97 % | DIASTOLIC BLOOD PRESSURE: 80 MMHG | TEMPERATURE: 97.8 F | RESPIRATION RATE: 18 BRPM

## 2022-05-06 PROCEDURE — 400013 HH SOC

## 2022-05-06 PROCEDURE — G0151 HHCP-SERV OF PT,EA 15 MIN: HCPCS

## 2022-05-06 NOTE — HOME HEALTH
Skilled services/Home bound verification:     Skilled Reason for admission/summary of clinical condition:     1. Acute CVA (cerebrovascular accident) ([facility] [address] : post TPA. Weakness from rt lower ext improved at this time. This patient is homebound for the following reasons Requires considerable and taxing effort to leave the home . Caregiver: spouse. Margie Caregiver assists with IADL's. Medications reconciled and all medications are available in the home this visit. High risk medication teaching regarding anticoagulants, hyperglycemic agents or opiod narcotics performed (specify) jardiance for risk of hypoglycemia    Dr. Jonah Luis notified of any discrepancies/look a like medications/medication interactions no severe interaction noted . Home health supplies by type and quantity ordered/delivered this visit include: none    Patient education provided this visit to include: HEP, fall prevention, dc planning . Patient level of understanding of education provided: Patient was able to partially teach back proper technique in use of RW and gait pattern heel to toe     Sharps Education Provided: Clinician instructed patient/CG on proper disposal of sharps: Containers should be made of hard plastic, be puncture-resistant and leakproof,   such as a laundry detergent or bleach bottle.  When the container is ¾ full, it should be sealed with tape and labeled   DO NOT RECYCLE prior to discarding in the regular trash.      Patient response to procedure performed:  Patient needed visual cues for HEP     Home exercise program/Homework provided: patient educated with HEP including seated hip flex, knee extension, hip abduction, hip adduction, ankle PF and DF and ball squeeze x 20 reps x 3 sets daily to improve MMT on BLE to facilitate with improved bed mobility, transfers and gait with AD. patient also educated with deep breathing exercises to be done daily x 10 reps x 3 sets to prevent SOB during activity. Patient educated with fall prevention technique by decluttering space, proper use of AD and footwear    Pt/Caregiver instructed on plan of care and are agreeable to plan of care at this time. Physician Dr. Isabel Little notified of patient admission to home health and plan of care including anticipated frequency of 1w1 2w4 for PT   Discharge planning discussed with patient and caregiver. Discharge planning as follows: dc to family with MD supervision when all goals are met . Pt/Caregiver did verbalize understanding of discharge planning. Next MD appointment TBD  Patient/caregiver encouraged/instructed to keep appointment as lack of follow through with physician appointment could result in discontinuation of home care services for non-compliance. PMHx: Acute CVA with RLE weakness   H/o MCA aneurysmal embolization 9/21 and SAH    Cardiac PFO    Bilateral popliteal artery aneurysm    Tremors     S: complained of pain on L shoulder 5/10 that is managed by rest, difficulty with transfers and gait   O:Patients Goals= Be able to go back to PLOF  Wound/Incision: location, description, drainage: none  PLOF: Lives with spouse and family in a 1 level house, prior to hospitalization, he was independent with ADLs and IADL's and did not use any AD for gait and was able to drive  STRENGTH L hip flexor, extensor, hip abductors,adductors 3/5, R hip flexor, extensor, hip abductors, adductors 4-/5, R knee flexor and extensor 4-/5, L knee flexor and extensor 3/5  SIT TO STAND from chair with Mod A x2   BALANCE Tinetti 7/28 high fall risk due to reduced strength on BLE, gait deviation and decreased efficiency of balance reactions. Patient demonstrated poor use of hip and ankle strategy during standing balance activity. Patient requires support from AD at all times for safety and stability.   GAIT Patient ambulated with RW x 30 feet with Mod A x1 to maintain upright posture and balance, demonstrated decreased step height and stride length on B feet. patient was educated with consistency in heel to toe gait pattern technique to prevent LOB and falls   BED MOBILITY Patient needed Mod A x1 with bed mobility   TRANSFERS Patient needed Mod A x1 with bed, chair, toilet transfers using RW   A: PT evaluation completed with the presence of  spouse who is the primary CG, POC established, Med rec done, HEP established  P:Home Safety eval/recommendations: Home health physical therapy initial evaluation performed. Patient demonstrates decreased strength, balance, and endurance which increases patient's overall fall risk and burden of care. Patient would benefit from home health physical therapy to improve balance, strength, and endurance which would decrease fall risk and allow patient to return to prior level of function once all functional goals or full rehab potential is met. patient educated with HEP including seated hip flex, knee extension, hip abduction, hip adduction, ankle PF and DF and ball squeeze x 20 reps x 3 sets daily to improve MMT on BLE to facilitate with improved bed mobility, transfers and gait with AD. patient also educated with deep breathing exercises to be done daily x 10 reps x 3 sets to prevent SOB during activity.  Patient educated with fall prevention technique by decluttering space, proper use of AD and footwear

## 2022-05-06 NOTE — Clinical Note
patient will be seen 1w1 2w4  for PT to improve overall functional mobility by graded therapeutic exercises, therapeutic activities, gait training, balance training and patient/caregiver education for safety at home.   Thank you for your referral

## 2022-05-06 NOTE — Clinical Note
Patient lives with spouse in a 1 level house, PLOF was independent and was able to ambulate without AD. patient doing very good however very impulsive and needs verbal cues for technique with transfers and gait using RW versus rollator

## 2022-05-10 ENCOUNTER — HOME CARE VISIT (OUTPATIENT)
Dept: SCHEDULING | Facility: HOME HEALTH | Age: 67
End: 2022-05-10
Payer: MEDICARE

## 2022-05-10 ENCOUNTER — HOME CARE VISIT (OUTPATIENT)
Dept: HOME HEALTH SERVICES | Facility: HOME HEALTH | Age: 67
End: 2022-05-10
Payer: MEDICARE

## 2022-05-10 VITALS
TEMPERATURE: 99.9 F | OXYGEN SATURATION: 96 % | SYSTOLIC BLOOD PRESSURE: 112 MMHG | HEART RATE: 69 BPM | DIASTOLIC BLOOD PRESSURE: 84 MMHG

## 2022-05-10 PROCEDURE — G0157 HHC PT ASSISTANT EA 15: HCPCS

## 2022-05-10 NOTE — HOME HEALTH
SUBJECTIVE: The pt seen sitting in the recliner upon today's arrival. The pt reitterates his PMH of aneurysm Sept 2021 and CVA April 2022. Akash Torresr PAIN: see pain assessment  OBJECTIVE: see interventions  . NEXT MD APPT: 5/11/22 with cardiology, 5/12/22 [facility] neurology  . CAREGIVER ASSISTANCE NEEDED FOR: The pt lives with his wife and 2 teenage sons. His wife works from home and assists with all care: medication management, meals, shopping, transportation, safety, ADLS/IADLs. .  ASSESSMENT AND PROGRESS TOWARD GOALS: The pt presents with noted weakness of the LE and core with decreased balance creating increased fall risks and requiring increased CG support for all mobility. The pt presents with difficulty with scooting to the edge of the recliner in preperation for sit to stand with both scooting and sit to stand requiring mod(A) and stand to sit requiring min(A) for safety. The pt presents with good, temporary carry over with cues for improved gait with increasing the L stride length, but requires re-cuing frequently. The pt does require cues for staying on track and needs the TV turned off for distractions. The pt will benefit from continued HHPT to increase his strength, balance and endurance to improve the safety of functional mobility while reducing his risks of falls and overall burden of care. PATIENT RESPONSE TO TREATMENT: good temporary corrections with gait requiring frequent cues with noted improved stepping pattern on tile vs carpet with difficulty navigating through narrow spaces and thresholds. PATIENT LEVEL OF UNDERSTANDING OF EDUCATION: pt able to teach back the HEP with the use of the handouts, but may require reminders from the pts wife.    .  CONTINUED NEED FOR THE FOLLOWING SKILLS: HH PT is medically necessary to address pain, decreased ROM, decreased strength, increased swelling, impaired bed mobility, decreased independence with functional transfers, impaired gait, impaired stair negotiation, and impaired balance in order to improve functional independence, quality of life, return to PLOF, reduce the risk for falls, and reduce pain. Ronal Brittle PLAN: Plan to progress the standing ther exs and add supine ther exs as well as address bed mobility, next visit. DISCHARGE PLANNING DISCUSSED: Discharge to self and family under MD supervision once all goals have been met or patient has reached maximum potential. Discussed with the pt the POC to continue HHPT x 1 visit this week then 2w3. The pt is in agreement to the POC at this time.

## 2022-05-13 ENCOUNTER — HOME CARE VISIT (OUTPATIENT)
Dept: SCHEDULING | Facility: HOME HEALTH | Age: 67
End: 2022-05-13
Payer: MEDICARE

## 2022-05-13 VITALS
HEART RATE: 72 BPM | DIASTOLIC BLOOD PRESSURE: 78 MMHG | SYSTOLIC BLOOD PRESSURE: 124 MMHG | OXYGEN SATURATION: 93 % | TEMPERATURE: 98.1 F

## 2022-05-13 VITALS
TEMPERATURE: 98.7 F | OXYGEN SATURATION: 97 % | HEART RATE: 67 BPM | SYSTOLIC BLOOD PRESSURE: 122 MMHG | DIASTOLIC BLOOD PRESSURE: 70 MMHG

## 2022-05-13 PROCEDURE — G0157 HHC PT ASSISTANT EA 15: HCPCS

## 2022-05-13 PROCEDURE — G0152 HHCP-SERV OF OT,EA 15 MIN: HCPCS

## 2022-05-13 NOTE — HOME HEALTH
SUBJECTIVE: The pt finishing with OT upon today's arrival. Reports fatigue. PAIN: see pain assessment    OBJECTIVE: see interventions    NEXT MD APPT: TBD    CAREGIVER ASSISTANCE NEEDED FOR: The pt lives with his wife and 2 teenage sons. His wife works from home and assists with all care: medication management, meals, shopping, transportation, safety, ADLS/IADLs. ASSESSMENT AND PROGRESS TOWARD GOALS:  The pt presents with increased need for assistance with sit<>Stand transfers and increased instability during ambulation which may be attributed to fatigue from having OT just prior to PT. The pt presents with weakness, decreased balance and endurance requiring increased CG assistance due to significant fall risks and unable to perform transfers without assistance. The pt will benefit from continued HHPT to improve the safe ability to perform functional mobility to reduce falls and overall burden of care. PATIENT RESPONSE TO TREATMENT: the pt requires frequent cues for corrections and focusing on the task at hand    PATIENT LEVEL OF UNDERSTANDING OF EDUCATION: the pt and the pts wife are able to teach back the HEP with the use of the handouts    CONTINUED NEED FOR THE FOLLOWING SKILLS: HH PT is medically necessary to address pain, decreased ROM, decreased strength, increased swelling, impaired bed mobility, decreased independence with functional transfers, impaired gait, impaired stair negotiation, and impaired balance in order to improve functional independence, quality of life, return to PLOF, reduce the risk for falls, and reduce pain. PLAN: Plan to progress the standing ther exs next visit. DISCHARGE PLANNING DISCUSSED: Discharge to self and family under MD supervision once all goals have been met or patient has reached maximum potential. Discussed with the pt the POC to continue HHPT  2w3. The pt is in agreement to the POC at this time.

## 2022-05-13 NOTE — Clinical Note
1w1, 2w3, 1w1    OASIS on me most likely unless PT extends. Plan to transition to out patient OT. Mr. Paulette Rosario had an aneurysm in September. I believe he has a one finger subluxation and tremors due to that aneurysm. He has good range of motion and strength distal to the left shoulder but is only able to lift the arm approximately 50% at this time. He then had a stroke this year. He was given TPA in the emergency room. His wife feels that he has improved and back to the level he was after the aneurysm but still recovering from that. He requires mod assist for sit to stand from Low surfaces and contact guard from his wheelchair and shower bench. Once he is up, he is able to ambulate with contact guard assist and cues for left foot swing to avoid a shuffling gait. He has been reluctant to use his built-in tub bench and preferred to stand for showers therefore making him dependent for bathing. I have suggested placing a towel on the bench for his comfort. He doesn't like the cold shower seat. He is agreeable to try this. I recommended they install two grab bars in the shower for ease with transfers and balance. While seated, I believe he will be able to bathe himself with standby assist.   He needs review of rubina technique with upper body and lower body dressing. His wife has been mostly dependently helping him because he allows it but is capable of doing more. Mr. Paulette Rosario appears very motivated and eager to work with occupational therapy. Please let me know if you have any questions after your visit.

## 2022-05-13 NOTE — HOME HEALTH
Caregiver involvement: Margie (spouse) lives with pt and assists him daily for ADL and mobility. Medications reviewed and all medications are available in the home this visit. The following education was provided regarding medications, medication interactions, and look alike medications (specify): Continue as directed by MD.    Medications  are effective at this time. Patient education provided this visit: educated pt on use of rubina technique to assist with dressing. Instructed on need to sit with ADL to reduce risk of falls. Recommended grab bars and hand held shower installation in shower. Advised pt on use of elevated seating surfaces for greatest ease with sit <> stand transfers. Sharps education provided:  na    Patient level of understanding of education provided: Pt and spouse agreeable to consider recommendations for safety with ADL/bathing. Able to provide teach back on use of elevated surfaces for greatest safety with sit <> stand transfers. Skilled Care Performed this visit: Completed OT evaluation and assessment for safety with ADL and mobility. Patient response to procedure performed:  Mr. Agustina Bustos had an excellent response to therapy. He denies having incrased pain or SOB during assessment    Patient's Progress towards personal goals: Mr. Agustina Bustos has good potential to meet goals and progress with self care and mobility. He is currently dependent for ADL tasks and requries min/mod A for safety with functional transfers in the home at the  level. Home exercise program: Pt to perform AAROM exercises while seated with B hands clasped/support to the L UE to perform shoulder flexion/extension, ab/adduction, elbow flexion/extension, supination/pronation, wrist flexion/extension and gross grasp and release.       Continued need for the following skills: Nursing, Physical Therapy, Occupational Therapy and Speech Language Pathology    Discharge Plans:  1w1, 2w3, 1w1 with plans to dishcarge to self once maximal potential has been achieved with self care and functional mobility. May benefit from transition to out patient OT for further skilled neuro rehabilitation of the L UE with daily tasks. CONTINUED NEED FOR THE FOLLOWING SKILLS: HH OT is medically necessary to address barriers of L UE weakness, L UE pain, decreased balance, decreased activity tolerance and generalized weakness. These barriers limit pt's participation in ADL and functional mobility safely and would benefit from continued skilled OT to maximize independende and reduce burden on caregiver. Inpatient Notes         HISTORY OF PRESENT ILLNESS  Alecia Salazar is a 74 y.o. male. HPI: Here for follow-up after hospital discharge. Was admitted on April 21 and discharged on April 24. He had a sudden onset Dr. Sharyle Corporal side limp weakness and speech abnormality. Went to the hospital via 911. Was given tPA. His weakness on the right lower extremity has been improved. His speech has been improved. He was started on statin, full dose of aspi rin 325 mg and will be started but he is Entresto. Vitals been stable. Taking his medications with compliance. Sitting in a wheelchair. Ambulating at home with the walker. Still has not received the home health he wanted someone to be forced discharge. Fall. Advised to use walker for safety and to prevent fall. During work-up at that hospital notes noted bilateral pulmonary. I agree aneurysm. Sending to vascular spec ialist.  Prior history of MCA aneurysm no embolization and subarachnoid hemorrhage. No recent fall or injury. Complaining of left shoulder pain and limited range of motion since 1 month. No tingling numbness or weakness on upper extremities. No facial weakness. Speech is clear. No trouble swallowing. Appetite is fair. Sleep is stable. No depression or anxiety. No urinary or bowel incontinence. Needing help with ADL. Did not appear in any acute distress during the visit.   Visit Vitals  /66 (BP 1 Location: Left upper arm, BP Patient Position: Sitting, BP Cuff Size: Adult)  Pulse 76  Temp 97.7 °F (36.5 °C) (Temporal)  Resp 16  SpO2 95%   ASSESSMENT and PLAN  ICD-10-CM ICD-9-CM  1. Acute CVA (cerebrovascular accident) ([facility] [address] : post TPA. Weakness from rt lower ext improved at this time. Still need help with walking. PT/ OT. Has n ot received a referral yet. Sending a new referral. No speech abnormality or trouble swallowing. Walking with walker at home but much less than before. I63.9 434.91 [address] TO VASCULAR SURGERY  2. Left shoulder pain, unspecified chronicity : obtain x-ray. Limited ROM . Advise home PT To address it as well. M25.512 719.41 XR SHOULDER LT AP/LAT MIN 2 V  3. Bilateral popliteal artery aneurysm ([facility]) : during work up during hospitalization. Sending to vascular specialist. I72.4 442.3 REFERRAL TO VASCULAR SURGERY  4. Tremor. \" will be following neurology. A1C was wnl. Pt and wife both understood and agree with the plan. Follow-up and Dispositions  · Return in about 3 months (around 8/2/2022).                  DME         Manual wheelchair  [address] [facility] wheeled walker  Four wheeled walker

## 2022-05-17 ENCOUNTER — HOME CARE VISIT (OUTPATIENT)
Dept: SCHEDULING | Facility: HOME HEALTH | Age: 67
End: 2022-05-17
Payer: MEDICARE

## 2022-05-17 VITALS
DIASTOLIC BLOOD PRESSURE: 64 MMHG | TEMPERATURE: 97.4 F | RESPIRATION RATE: 18 BRPM | OXYGEN SATURATION: 98 % | SYSTOLIC BLOOD PRESSURE: 122 MMHG | HEART RATE: 67 BPM

## 2022-05-17 PROCEDURE — G0157 HHC PT ASSISTANT EA 15: HCPCS

## 2022-05-17 PROCEDURE — G0158 HHC OT ASSISTANT EA 15: HCPCS

## 2022-05-17 NOTE — HOME HEALTH
SUBJECTIVE: Pt seated in recliner upon arrival, pt has difficulty with transfers from this chair due to low height and deep seat, pt agreeable after training to move spots to recliner couch that has more support and arm rests in better position to push up from. CAREGIVER ASSISTANCE NEEDED FOR: pts wife present for assistance and education on safe transfer technique and body mechanics. MEDICATIONS REVIEWED AND UPDATED: no medication changes reported this visit. .  OBJECTIVE: see interventions  PATIENT RESPONSE TO TREATMENT:  Pt demonstrated positive response to treatment with min increased pain in L shoulder during transfers and good participation. Dave Jack PATIENT/CAREGIVER EDUCATION PROVIDED THIS VISIT: Therapist educated pt on alternate positioning for HEP performance and LUE mobility, alternate seat in living room that pt can more easily transfer from and proper body mechanics for all transfers to increase I. PATIENT LEVEL OF UNDERSTANDING OF EDUCATION PROVIDED: pt demonstrated good understanding of education through teachback and consistent improvment with transfers requiring less assistance each time. ASSESSMENT AND PROGRESS TOWARD GOALS:  Pt demonstrated good progress towards goals for increased I with functional mobility, balance with walker and carry over of UB HEP. Patient will benefit from continued intervention with progression of ADL, transfer, balance and strength training. CONTINUED NEED FOR THE FOLLOWING SKILLS: HH OT is medically necessary to address pain, decreased ROM, decreased strength, impaired bed mobility, decreased independence with functional transfers, decreased I with ADLs, and impaired balance in order to improve functional independence, quality of life, return to PLOF, reduce the risk for falls, and reduce pain. PLAN: next visit will be for neuro re-education and ADL training at shower level.     DISCHARGE PLANNING DISCUSSED: Discharge to self and family under MD supervision once all goals have been met or patient has reached maximum potential.  Frequency remaininw1, 2w2, 1w1 remaining. .  .  .  Full PPE and COVID precautions.

## 2022-05-18 VITALS
OXYGEN SATURATION: 98 % | TEMPERATURE: 97.4 F | SYSTOLIC BLOOD PRESSURE: 122 MMHG | HEART RATE: 67 BPM | DIASTOLIC BLOOD PRESSURE: 78 MMHG

## 2022-05-18 DIAGNOSIS — I50.22 CHRONIC SYSTOLIC CONGESTIVE HEART FAILURE (HCC): Primary | ICD-10-CM

## 2022-05-18 RX ORDER — SACUBITRIL AND VALSARTAN 49; 51 MG/1; MG/1
1 TABLET, FILM COATED ORAL 2 TIMES DAILY
Qty: 60 TABLET | Refills: 3 | Status: SHIPPED
Start: 2022-05-18 | End: 2022-11-03 | Stop reason: DRUGHIGH

## 2022-05-18 NOTE — TELEPHONE ENCOUNTER
Requested Prescriptions     Pending Prescriptions Disp Refills    sacubitriL-valsartan (Entresto) 49-51 mg tab tablet 60 Tablet 3     Sig: Take 1 Tablet by mouth two (2) times a day.

## 2022-05-18 NOTE — HOME HEALTH
SUBJECTIVE: COVID 19 positive as of 5/15/22. Full PPE donned for treatment. PAIN: see pain assessment    OBJECTIVE: see interventions. Co-treat with OT on this date for improved consistency and carry over with transfers. NEXT MD APPT: ANTHONY    CAREGIVER ASSISTANCE NEEDED FOR: The pt lives with his wife and 2 teenage sons. His wife works from home and assists with all care: medication management, meals, shopping, transportation, safety, ADLS/IADLs. ASSESSMENT AND PROGRESS TOWARD GOALS: The pt demonstrated a positive result in HHPT on this date with the improved ability to perform sit<>stand from a new resting area in the living room after education/instruction. The pt will benefit from continued HHPT to improve the safety with functional mobility by increasing strength, balance, and endurance. PATIENT RESPONSE TO TREATMENT: improved ability to perform sit<>Stand to/from the new resting spot in the living room    PATIENT LEVEL OF UNDERSTANDING OF EDUCATION: the pt and the pts wife are able to teach back the HEP with the use of the handouts    CONTINUED NEED FOR THE FOLLOWING SKILLS: HH PT is medically necessary to address pain, decreased ROM, decreased strength, increased swelling, impaired bed mobility, decreased independence with functional transfers, impaired gait, impaired stair negotiation, and impaired balance in order to improve functional independence, quality of life, return to PLOF, reduce the risk for falls, and reduce pain. PLAN: Plan to progress the standing ther exs next visit. DISCHARGE PLANNING DISCUSSED: Discharge to self and family under MD supervision once all goals have been met or patient has reached maximum potential. Discussed with the pt the POC to continue HHPT  x 1 visit this week then 2w2. The pt is in agreement to the POC at this time.

## 2022-05-19 ENCOUNTER — HOME CARE VISIT (OUTPATIENT)
Dept: SCHEDULING | Facility: HOME HEALTH | Age: 67
End: 2022-05-19
Payer: MEDICARE

## 2022-05-19 VITALS
OXYGEN SATURATION: 98 % | SYSTOLIC BLOOD PRESSURE: 148 MMHG | RESPIRATION RATE: 17 BRPM | TEMPERATURE: 98 F | DIASTOLIC BLOOD PRESSURE: 82 MMHG | HEART RATE: 68 BPM

## 2022-05-19 PROCEDURE — G0158 HHC OT ASSISTANT EA 15: HCPCS

## 2022-05-20 ENCOUNTER — HOME CARE VISIT (OUTPATIENT)
Dept: SCHEDULING | Facility: HOME HEALTH | Age: 67
End: 2022-05-20
Payer: MEDICARE

## 2022-05-20 PROCEDURE — G0157 HHC PT ASSISTANT EA 15: HCPCS

## 2022-05-20 NOTE — HOME HEALTH
SUBJECTIVE: Pt seated on couch in newly recommended spot, reported he had neck pain this morning from potentially sleeping wrong on it and was feeling weaker. Pt needed increased assistance with transfers and v/c for progression of L foot with gait. CAREGIVER ASSISTANCE NEEDED FOR: pts wife present during visit for education and assistance  MEDICATIONS REVIEWED AND UPDATED: no medication changes reported this visit. .  OBJECTIVE: see interventions  PATIENT RESPONSE TO TREATMENT:  Pt demonstrated positive response to treatment with no increased pain during activity and good participation. Kwadwo Beyer PATIENT/CAREGIVER EDUCATION PROVIDED THIS VISIT: Therapist educated pt and CG on use of wrist weights, weighted utensils and open-hand WB of LUE to help with decreasing intention tremors during writing, feeding and ADLs, also recommended grab bar placement and removable shower head for I use when seated on bench. PATIENT LEVEL OF UNDERSTANDING OF EDUCATION PROVIDED: Pt and CG ordered wrist weights and built up  for utensils during visit to have ready for next visit training. ASSESSMENT AND PROGRESS TOWARD GOALS:  Pt demonstrated good progress towards goals for safety and I with self-care through understanding of recommendations and education. Patient will benefit from continued intervention with progression of ADL, transfer, balance and neuro training for increased safety and I in home environment. CONTINUED NEED FOR THE FOLLOWING SKILLS: HH OT is medically necessary to address pain, decreased ROM, decreased strength, impaired bed mobility, decreased independence with functional transfers, decreased I with ADLs, and impaired balance in order to improve functional independence, quality of life, return to PLOF, reduce the risk for falls, and reduce pain. PLAN: next visit will be for further ADL and neuro training with use of  wrist weights, built up  and other AD for increased safety in home.   DISCHARGE PLANNING DISCUSSED: Discharge to self and family under MD supervision once all goals have been met or patient has reached maximum potential.  Frequency remaininw2, 1w1 remaining. .  .  Full COVID PPE worn.

## 2022-05-22 NOTE — HOME HEALTH
SUBJECTIVE: The pt reports increased activity today which may fatigued him. PAIN: see pain assessment    OBJECTIVE: see interventions    NEXT MD APPT: ANTHONY    CAREGIVER ASSISTANCE NEEDED FOR: The pt lives with his wife and 2 teenage sons. His wife works from home and assists with all care: medication management, meals, shopping, transportation, safety, ADLS/IADLs. ASSESSMENT AND PROGRESS TOWARD GOALS: The pt continues to present with decreased strength, balance, and endurance creating the need for increased CG assistance, falls, hopsitalizations, and falls. The pt limited with sit to stand transfers and bed mobliity due to L shoulder and back pain on this date which may be due to increased activity on this date. Minimal improvements made with gait corrections through narrow passageways and thresholds on this date with pt fatiguing easily with activity. The pt will benefit from continued HHPT to improve the safety and reduce CG assist with household mobility. PATIENT RESPONSE TO TREATMENT: noted fatigue creating the need for increased assistance. pt limited with bed mobility and sit to stand due to L shoulder pain. PATIENT LEVEL OF UNDERSTANDING OF EDUCATION: the pt and the pts wife are able to teach back the HEP with the use of the handouts    CONTINUED NEED FOR THE FOLLOWING SKILLS: HH PT is medically necessary to address pain, decreased ROM, decreased strength,  impaired bed mobility, decreased independence with functional transfers, impaired gait, impaired stair negotiation, and impaired balance in order to improve functional independence, quality of life, return to PLOF, reduce the risk for falls, and reduce pain. PLAN: Plan to attempt bed mobility on the right side of the bed, next visit. DISCHARGE PLANNING DISCUSSED: Discharge to self and family under MD supervision once all goals have been met or patient has reached maximum potential. Discussed with the pt the POC to continue HHPT 2w2. The pt is in agreement to the POC at this time.

## 2022-05-23 ENCOUNTER — HOME CARE VISIT (OUTPATIENT)
Dept: SCHEDULING | Facility: HOME HEALTH | Age: 67
End: 2022-05-23
Payer: MEDICARE

## 2022-05-23 PROCEDURE — G0158 HHC OT ASSISTANT EA 15: HCPCS

## 2022-05-24 ENCOUNTER — HOME CARE VISIT (OUTPATIENT)
Dept: SCHEDULING | Facility: HOME HEALTH | Age: 67
End: 2022-05-24
Payer: MEDICARE

## 2022-05-24 VITALS
RESPIRATION RATE: 18 BRPM | TEMPERATURE: 97.8 F | DIASTOLIC BLOOD PRESSURE: 74 MMHG | SYSTOLIC BLOOD PRESSURE: 138 MMHG | HEART RATE: 82 BPM | OXYGEN SATURATION: 97 %

## 2022-05-24 PROCEDURE — G0157 HHC PT ASSISTANT EA 15: HCPCS

## 2022-05-24 NOTE — HOME HEALTH
SUBJECTIVE:  The pt and the pts wife reiterate the fall that was reported to the [facility]. They state that the pt walked to the kitchen to get his watch out of  drawer and turned and started walking without his walker and fell. His wife came home to find him uninjured and had to have the neighbors assist him off the floor. The pt states \"I think I could have gotten up if I would have crawled to the couch. \"    PAIN: see pain assessment    OBJECTIVE: see interventions    NEXT MD APPT: ANTHONY    CAREGIVER ASSISTANCE NEEDED FOR: The pt lives with his wife and 2 teenage sons. His wife works from home and assists with all care: medication management, meals, shopping, transportation, safety, ADLS/IADLs. ASSESSMENT AND PROGRESS TOWARD GOALS: The pt presents with difficulty following cues during today's visit during fall recovery, requiring max(A) to D for transfer. The pt does present with the improved ability to perform sit to stand with less assistance consistently in the newly recommended sitting area; however, the pts carry over with compliance of the self strengthening program is questionable at this time. The pt will benefit from continued HHPT to increase strength and balance to improve gait and transfers for improved safety and to decrease the future risks of falls. PATIENT RESPONSE TO TREATMENT: reports of L shoulder pain with activity which limited his ability to assist with fall recovery transfers. PATIENT LEVEL OF UNDERSTANDING OF EDUCATION: the pt and the pts wife state the understanding of the need for compliance with the HEP.      CONTINUED NEED FOR THE FOLLOWING SKILLS: HH PT is medically necessary to address pain, decreased ROM, decreased strength,  impaired bed mobility, decreased independence with functional transfers, impaired gait, impaired stair negotiation, and impaired balance in order to improve functional independence, quality of life, return to PLOF, reduce the risk for falls, and reduce pain.    PLAN: supervisory/PFA with Rylie Red PT next visit. DISCHARGE PLANNING DISCUSSED: Discharge to self and family under MD supervision once all goals have been met or patient has reached maximum potential. Discussed with the pt the POC to continue HHPT 2w2. The pt is in agreement to the POC at this time.     FULL PPE DONNED

## 2022-05-25 ENCOUNTER — HOME CARE VISIT (OUTPATIENT)
Dept: SCHEDULING | Facility: HOME HEALTH | Age: 67
End: 2022-05-25
Payer: MEDICARE

## 2022-05-25 PROCEDURE — G0152 HHCP-SERV OF OT,EA 15 MIN: HCPCS

## 2022-05-26 ENCOUNTER — HOME CARE VISIT (OUTPATIENT)
Dept: SCHEDULING | Facility: HOME HEALTH | Age: 67
End: 2022-05-26
Payer: MEDICARE

## 2022-05-26 ENCOUNTER — HOME CARE VISIT (OUTPATIENT)
Dept: HOME HEALTH SERVICES | Facility: HOME HEALTH | Age: 67
End: 2022-05-26
Payer: MEDICARE

## 2022-05-26 VITALS
RESPIRATION RATE: 14 BRPM | TEMPERATURE: 97.2 F | SYSTOLIC BLOOD PRESSURE: 124 MMHG | HEART RATE: 98 BPM | DIASTOLIC BLOOD PRESSURE: 90 MMHG | OXYGEN SATURATION: 97 %

## 2022-05-26 VITALS
TEMPERATURE: 97.5 F | DIASTOLIC BLOOD PRESSURE: 82 MMHG | OXYGEN SATURATION: 98 % | HEART RATE: 63 BPM | SYSTOLIC BLOOD PRESSURE: 150 MMHG

## 2022-05-26 PROCEDURE — G0151 HHCP-SERV OF PT,EA 15 MIN: HCPCS

## 2022-05-26 NOTE — HOME HEALTH
SUBJECTIVE: I am doing ok today. I just recently got up   [facility]: transportation, medications, IADLS, ADLS   MEDICATIONS REVIEWED AND RECONCILED no changes   NEXT MD APPT:  in 8/2/22  ROM:B LE WFL   STRENGTH: B hip flexors 4/5 B quads, hamstrings +4/5 B ADD/ABD 5/5 B DF/ PF 5/5   WOUNDS:none   BED MOBILITY:supine to sit going to the L with MOD A for upper body managment. sit to supine going to the L with MIN / CGA for lower boday management. Supine to sit going to the R with SBA with min vc needed for technique and sequencing. sit to supine to the R with SBA with MIN vc needed for technqiue and sequeing. when in sideling pt needed MIN vc to keep knees together to assit with log rolling technqiue   TRANSFERS: sit to stand from recliner chair x 2 with SBA with MOD vc needed for techique and safety. vc needed to get to center of the chair priior to sitting. To reach back for surfaces for safety. sit to stand from the bed x 2 with SBA with vc needed to reach back for surfaces and not to use B LE to brace against the bed to come to standing. Per PTA visit 5/24/22 FALL RECOVERY - pt education for use of the couch to assist with floor to stand transfers. Max(A) to get in to quadruped and max(A) for getting from the knees to the feet with max cues for hand placement on to the couch. Pt once pt up on to the LEs and UEs on the couch he no longer was able to follow cues which he attributed to pain in the L shoulder. He then required max(A) to D for finishing the tranfer which included placement in to a safe sitting position on the couch. GAIT: Pt amb 50 ft x 2 with RW with CGA with fwd flexed posture when pt concentrates he is able to demonstrate reciprocal gait pattern with B feet clearing the floor with equal step length. When he is distraced he tend to have decreased step length L LE L foot does not always clear the floor during swing phase of gait.  B step lengths are unequal. When turning increased A requied as pt tends to shuffle and take many small steps and was unable to self correct with vc. When pt comes to threshold or chagnes in surfaces he again will begin to shuffle and take very small steps. .  Per PTA visit 5/20/22 gait training for improved gait mechanics focusing on improved step through pattern on the L while staying within the safety limits of the walker. the pt ambulated on inside surfaces 50 ft x 2, CGA with frequent cues for L step through pattern without the ability to perform through narrow passageways and thresholds creating increased space between him and the walker. the pt begins to shuffle step without the ability to correct. BALANCE: Pt scored 14/28 on Tinetti Balance Assessment placing pt at high  risk for falls. siting posture in chair pt tends to list to the R and was unable to self correct on his own. LPTA placed a pillow on R side to get a neutral pelvis position. Recommeded use of a mirror so pt could see posture and hopeful self correct  PATIENT RESPONE TO TX: Pt pain level remained the same throughout tx session   PATIENT LEVEL OF UNDERSTANDING OF EDUCATION PROVIDED Pt and wife verbalized that pt needs to use RW at all times when amb for safety and pt needs someone with him at all times when amb  PATIENT EDUCATION PROVIDED THIS VISIT: safety, HEP, walking, deep breathing,       HEP consisting of:  1) stand every waking hour at the RW with his wife   2) standing ther exs 3x/day, supine ther exs 2x/day   standing: (at the kitchen sink with the pts wife providing SBA): calf raises, R/L hip abd, R/L HS curls, mini squats, marching 2 x 10   supine: heel slides, hip abd, bridging, LS rotations 2 x 10  Written HEP issued, patient/caregiver verbalized understanding.    CONTINUED NEED FOR THE FOLLOWING SKILLS: HH PT is medically necessary to  address pain,  decreased strength, , impaired bed mobility, decreased independence with functional transfers, impaired gait,  and impaired balance in order to improve functional independence, quality of life, return to PLOF, and reduce the risk for falls. ASSESSMENT: Pt has made some progress in therapy. . On [facility] 5/6/22  strength was L hip flexor, extensor, hip abductors,adductors 3/5, R hip flexor, extensor, hip abductors, adductors 4-/5, R knee flexor and extensor 4-/5, L knee flexor and extensor 3/5. Today on Re Assessment strength is   B hip flexors 4/5 B quads, hamstrings +4/5 B ADD/ABD 5/5 B DF/ PF 5/5 . On [facility] bed mobility was Patient needed Mod A x1 with bed mobility. Today at Re Assessment bed mobility is supine to sit going to the L with MOD A for upper body managment. sit to supine going to the L with MIN / CGA for lower boday management. Supine to sit going to the R with SBA with min vc needed for technique and sequencing. sit to supine to the R with SBA with MIN vc needed for technqiue and sequeing. when in sideling pt needed MIN vc to keep knees together to assit with log rolling technqiue . On [facility] transfers were Patient needed Mod A x1 with bed, chair, toilet transfers using RW . Today at Re Assessment transfers are sit to stand from recliner chair x 2 with SBA with MOD vc needed for techique and safety. vc needed to get to center of the chair priior to sitting. To reach back for surfaces for safety. sit to stand from the bed x 2 with SBA with vc needed to reach back for surfaces and not to use B LE to brace against the bed to come to standing. Per PTA visit 5/24/22 FALL RECOVERY - pt education for use of the couch to assist with floor to stand transfers. Max(A) to get in to quadruped and max(A) for getting from the knees to the feet with max cues for hand placement on to the couch. Pt once pt up on to the LEs and UEs on the couch he no longer was able to follow cues which he attributed to pain in the L shoulder.  He then required max(A) to D for finishing the tranfer which included placement in to a safe sitting position on the couch..  On [facility] gait was Patient ambulated with RW x 30 feet with Mod A x1 to maintain upright posture and balance, demonstrated decreased step height and stride length on B feet. patient was educated with consistency in heel to toe gait pattern technique to prevent LOB and falls. Today at Re Assessment gait is Pt amb 50 ft x 2 with RW with CGA with fwd flexed posture when pt concentrates he is able to demonstrate reciprocal gait pattern with B feet clearing the floor with equal step length. When he is distraced he tend to have decreased step length L LE L foot does not always clear the floor during swing phase of gait. B step lengths are unequal. When turning increased A requied as pt tends to shuffle and take many small steps and was unable to self correct with vc. When pt comes to threshold or chagnes in surfaces he again will begin to shuffle and take very small steps. .  Per PTA visit 5/20/22 gait training for improved gait mechanics focusing on improved step through pattern on the L while staying within the safety limits of the walker. the pt ambulated on inside surfaces 50 ft x 2, CGA with frequent cues for L step through pattern without the ability to perform through narrow passageways and thresholds creating increased space between him and the walker. the pt begins to shuffle step without the ability to correct. On [facility] Tinetti 7/28 high fall risk due to reduced strength on BLE, gait deviation and decreased efficiency of balance reactions. Patient demonstrated poor use of hip and ankle strategy during standing balance activity. Patient requires support from AD at all times for safety and stability. Today at Re Assessment  pt scored a 14/28 on Tinetti Balance Assessment placing pt as a high  fall risk. Pt goals have been updated. We are going to request and extension of HHPT to beind 6/5/22 for 2wk2 . To cont to work on gait training with [id number]. Dynamic standing balance re educations.  transfer training and strength training B LE. All to increased pt functional I   PLAN: transfer training, gait training, bed mobity skills   DISCHARGE PLANNING DISCUSSED: Discharge to self and family under MD supervision once all goals have been met or patient has reached max potential. Patient/caregiver verbalized understanding.

## 2022-05-31 ENCOUNTER — HOME CARE VISIT (OUTPATIENT)
Dept: SCHEDULING | Facility: HOME HEALTH | Age: 67
End: 2022-05-31
Payer: MEDICARE

## 2022-05-31 PROCEDURE — G0158 HHC OT ASSISTANT EA 15: HCPCS

## 2022-06-01 ENCOUNTER — HOME CARE VISIT (OUTPATIENT)
Dept: SCHEDULING | Facility: HOME HEALTH | Age: 67
End: 2022-06-01
Payer: MEDICARE

## 2022-06-01 VITALS
RESPIRATION RATE: 17 BRPM | TEMPERATURE: 97.6 F | DIASTOLIC BLOOD PRESSURE: 66 MMHG | SYSTOLIC BLOOD PRESSURE: 140 MMHG | OXYGEN SATURATION: 98 % | HEART RATE: 83 BPM

## 2022-06-01 PROCEDURE — G0157 HHC PT ASSISTANT EA 15: HCPCS

## 2022-06-01 NOTE — HOME HEALTH
SUBJECTIVE: pt in recliner upon arrival, reported he was feeling good and took a shower this morning that went well but pt is still standing at walker and having his wife bathe him as they do not have a removable shower head. Pts wife reported pt is I brushing his teeth and shaving most of his face while seated. Pt also reported he has not been consistent with using wrist weights and built-up  with feeding but has noticed some decrease in tremors since starting. CAREGIVER ASSISTANCE NEEDED FOR: pts wife present for assistance and education. MEDICATIONS REVIEWED AND UPDATED: no medication changes reported this visit  . OBJECTIVE: see interventions  PATIENT RESPONSE TO TREATMENT:  Pt demonstrated positive response to treatment with min increased pain in L shoulder during transfer to push up and good participation. Chester Sweet PATIENT/CAREGIVER EDUCATION PROVIDED THIS VISIT: Therapist educated pt on importance of him initiating carry over of education, exercises and self-care as it is his responsibility and he will continued to not improve. PATIENT LEVEL OF UNDERSTANDING OF EDUCATION PROVIDED: Pt verbalized good understanding and created plan for addressing LB dressing more, use of wrist weights and built-up  daily with feeding. ASSESSMENT AND PROGRESS TOWARD GOALS:  Pt demonstrated good progress towards goals for increased I with toileting and use of 2-hour schedule for bladder control, improved LUE functional use through HEP carry over. Patient will benefit from continued intervention with progression of ADL training at shower level, LB dressing and LUE neuro training.   CONTINUED NEED FOR THE FOLLOWING SKILLS: HH OT is medically necessary to address pain, decreased ROM, decreased strength, impaired bed mobility, decreased independence with functional transfers, decreased I with ADLs, and impaired balance in order to improve functional independence, quality of life, return to PLOF, reduce the risk for falls, and reduce pain. PLAN: next visit will be for further ADL training at shower level with new DME if available. DISCHARGE PLANNING DISCUSSED: Discharge to self and family under MD supervision once all goals have been met or patient has reached maximum potential.  Frequency remaininw2 remaining.

## 2022-06-02 ENCOUNTER — HOME CARE VISIT (OUTPATIENT)
Dept: SCHEDULING | Facility: HOME HEALTH | Age: 67
End: 2022-06-02
Payer: MEDICARE

## 2022-06-02 VITALS
DIASTOLIC BLOOD PRESSURE: 80 MMHG | SYSTOLIC BLOOD PRESSURE: 142 MMHG | HEART RATE: 80 BPM | TEMPERATURE: 97.8 F | RESPIRATION RATE: 16 BRPM | OXYGEN SATURATION: 93 %

## 2022-06-02 VITALS
DIASTOLIC BLOOD PRESSURE: 80 MMHG | HEART RATE: 82 BPM | OXYGEN SATURATION: 95 % | TEMPERATURE: 95.6 F | SYSTOLIC BLOOD PRESSURE: 116 MMHG

## 2022-06-02 PROCEDURE — G0158 HHC OT ASSISTANT EA 15: HCPCS

## 2022-06-02 NOTE — HOME HEALTH
SUBJECTIVE: Upon arrival pt was seated on couch, he stated he had asked therapist to come a little later as he was finally able to sleep through the night well on the new side of the bed and feels that his increased sleep has made a big difference in how he was feeling the last few days and that today was much better. Therapist disucssed approaching DC for OT next week and asked about pt thoughts on OP transition, he and his wife are more open to continuing with therapy and understand that he has reached his max potential at home and it is his responsibility to carry over all the things he has been educated on such as exercises, transfer and ADL techniques and consistency with using recommended AD. CAREGIVER ASSISTANCE NEEDED FOR: pts wife present during visit to assist and for education. MEDICATIONS REVIEWED AND UPDATED: no medication changes reported this visit  . OBJECTIVE: see interventions  PATIENT RESPONSE TO TREATMENT:  Pt demonstrated a positive response to treatment no increased pain and improved participation with visit. Oliverio Cline PATIENT/CAREGIVER EDUCATION PROVIDED THIS VISIT: Pt and CG thoroughly educated on pt self-limitations being the reason why he has not improved further or met all of his goals, he is very capable of reaching them and has been educated on techniques, use of AD, exercises and routine modifications to increase I and has not carried them over with any consistency. PATIENT LEVEL OF UNDERSTANDING OF EDUCATION PROVIDED: Pt verbalized understanding about importance of initiating tasks and being more I with doing his exercises and self-care as well as continuing with OP when home care ends to further progress with LUE mobility and strength. ASSESSMENT AND PROGRESS TOWARD GOALS:  Pt demonstrated fair progress towards goals and was self-limiting with poor carry over of education and recommendations to meet all goals at this time.   PLAN: next visit will be for OTDC  as pt has reached his max potential with home care at this time. DISCHARGE PLANNING DISCUSSED: Discharge to self and family under MD supervision once all goals have been met or patient has reached maximum potential.  Frequency remaininw1 remaining with pt aware of OTDC.

## 2022-06-02 NOTE — HOME HEALTH
SUBJECTIVE: The pt seen seated on the couch in the recommended surface, but signficantly leaned to the L in which education was given for proper posture and the use of a mirror in front of the couch to improve sitting posture (not yet gotten). When asked how long the pt had been sitting her answered with \"a few hours\". The pt then reminded for instruction to stand every waking hour at the RW. The pt and the pts wife report that the pt has not gotten good sleep over the past few nights from the change in sides of the bed. PAIN: see pain assessment    OBJECTIVE: see interventions    NEXT MD APPT: ANTHONY    CAREGIVER ASSISTANCE NEEDED FOR: The pt lives with his wife and 2 teenage sons. His wife works from home and assists with all care: medication management, meals, shopping, transportation, safety, ADLS/IADLs. ASSESSMENT AND PROGRESS TOWARD GOALS: The pt demonstrated no progress towards HHPT goals on this date as he required increased assistance for sit to stand from the couch at max(A) and increased assistance from the right side of the bed for sit <> supine at max(A) with no carry over with cues for improved gait with signficant shuffled stepping pattern and holding the walker out in front of him. Today's lack of progress may be attributed from fatigue from not sleeping well over the past few nights due to the change in sides of the bed. The pt will benefit from continued HHPT for improved carry over for the safe ability to perform functional household mobility with less assistance and less risks of falls. PATIENT RESPONSE TO TREATMENT: R knee pain and Low back pain limiting tolerance to activity on this date. PATIENT LEVEL OF UNDERSTANDING OF EDUCATION: the pt and the pts wife state the understanding of the need for compliance with the HEP.      CONTINUED NEED FOR THE FOLLOWING SKILLS: HH PT is medically necessary to address pain, decreased ROM, decreased strength,  impaired bed mobility, decreased independence with functional transfers, impaired gait, impaired stair negotiation, and impaired balance in order to improve functional independence, quality of life, return to PLOF, reduce the risk for falls, and reduce pain. PLAN: readdress bed mobility with step next visit. DISCHARGE PLANNING DISCUSSED: Discharge to self and family under MD supervision once all goals have been met or patient has reached maximum potential. Discussed with the pt the POC to continue HHPT x 1 visit this week then 2w2 per extension approval. The pt is in agreement to the POC at this time.

## 2022-06-03 ENCOUNTER — HOME CARE VISIT (OUTPATIENT)
Dept: SCHEDULING | Facility: HOME HEALTH | Age: 67
End: 2022-06-03
Payer: MEDICARE

## 2022-06-03 VITALS
OXYGEN SATURATION: 98 % | DIASTOLIC BLOOD PRESSURE: 90 MMHG | HEART RATE: 84 BPM | SYSTOLIC BLOOD PRESSURE: 120 MMHG | TEMPERATURE: 97.6 F

## 2022-06-03 PROCEDURE — G0157 HHC PT ASSISTANT EA 15: HCPCS

## 2022-06-03 NOTE — HOME HEALTH
SUBJECTIVE: The pt and his wife report having to go out today for a meeting and after the meeting when the pt was walking he got \"stuck\" and could not move. They both report that the pt has been able to get in and out of the bed better than performed the last visit and really believe that the increased assistance last visit was due to lack of sleep/fatigue. PAIN: see pain assessment    OBJECTIVE: see interventions    NEXT MD APPT: TBD    CAREGIVER ASSISTANCE NEEDED FOR: The pt lives with his wife and 2 teenage sons. His wife works from home and assists with all care: medication management, meals, shopping, transportation, safety, ADLS/IADLs. ASSESSMENT AND PROGRESS TOWARD GOALS: The pt demonstrated improved functional mobility on this date with use of a step to assist with improved placement/set up for sit to supine reducing overall CG assistance. The pt able to perform sit to supine with min(A) for getting the LEs in to the bed and min(A) for RSL to sit. Ambulation continues to be challenging with the pt presenting with shuffled stepping especially with distractions and narrow passageways. Pt education in regards to being focused on task at hand. PATIENT RESPONSE TO TREATMENT:  improved carry over with log rolling with decreased back pain. PATIENT LEVEL OF UNDERSTANDING OF EDUCATION: the pt and the pts wife state the understanding of the need for compliance with the HEP. CONTINUED NEED FOR THE FOLLOWING SKILLS: HH PT is medically necessary to address pain, decreased ROM, decreased strength,  impaired bed mobility, decreased independence with functional transfers, impaired gait, impaired stair negotiation, and impaired balance in order to improve functional independence, quality of life, return to PLOF, reduce the risk for falls, and reduce pain. PLAN: plan to progress the standing strengthening program next visit and educate on the absolute need for compliance to improve strength and balance. DISCHARGE PLANNING DISCUSSED: Discharge to self and family under MD supervision once all goals have been met or patient has reached maximum potential. Discussed with the pt the POC to continue HHPT  2w2 per extension approval. The pt is in agreement to the POC at this time.

## 2022-06-06 ENCOUNTER — HOME CARE VISIT (OUTPATIENT)
Dept: SCHEDULING | Facility: HOME HEALTH | Age: 67
End: 2022-06-06
Payer: MEDICARE

## 2022-06-06 VITALS
TEMPERATURE: 98.8 F | OXYGEN SATURATION: 93 % | SYSTOLIC BLOOD PRESSURE: 132 MMHG | DIASTOLIC BLOOD PRESSURE: 86 MMHG | HEART RATE: 87 BPM

## 2022-06-06 PROCEDURE — 400013 HH SOC

## 2022-06-06 PROCEDURE — G0152 HHCP-SERV OF OT,EA 15 MIN: HCPCS

## 2022-06-07 ENCOUNTER — HOME CARE VISIT (OUTPATIENT)
Dept: SCHEDULING | Facility: HOME HEALTH | Age: 67
End: 2022-06-07
Payer: MEDICARE

## 2022-06-07 VITALS
DIASTOLIC BLOOD PRESSURE: 84 MMHG | HEART RATE: 82 BPM | SYSTOLIC BLOOD PRESSURE: 116 MMHG | TEMPERATURE: 98.3 F | OXYGEN SATURATION: 96 %

## 2022-06-07 PROCEDURE — G0157 HHC PT ASSISTANT EA 15: HCPCS

## 2022-06-07 NOTE — HOME HEALTH
SUBJECTIVE: The pt and the pts wife report improved sleep, improved sitting postures and improved compliance with the HEP. PAIN: see pain assessment    OBJECTIVE: see interventions    NEXT MD APPT: ANTHONY    CAREGIVER ASSISTANCE NEEDED FOR: The pt lives with his wife and 2 teenage sons. His wife works from home and assists with all care: medication management, meals, shopping, transportation, safety, ADLS/IADLs. ASSESSMENT AND PROGRESS TOWARD GOALS: The pt demonstrated improved carry over with scooting fwd in sitting in preparation for sit to stand transfers requiring no cues to do so. He performed sit to stand from the recliner with min(A) and CGA from the dining room table chair x 3 consecutive reps. The pt demonstrated decreased ability to perform prolonged standing on the RLE during the standing ther exs with improved ability with alternating the LE ther exs vs prolonged standing. The pt continues to present with decreased balance requiring SBA for the standing ther exs with support and CGA without support and constant cues for improved LLE swing through during ambulation with fair to poor carry over. Discussed the need for consistency with the HEP to develop carry over of strength and balance on this date. PATIENT RESPONSE TO TREATMENT:  pt reports no pain pre PT, during PT, and/or post PT. PATIENT LEVEL OF UNDERSTANDING OF EDUCATION:      CONTINUED NEED FOR THE FOLLOWING SKILLS: HH PT is medically necessary to address pain, decreased ROM, decreased strength,  impaired bed mobility, decreased independence with functional transfers, impaired gait, impaired stair negotiation, and impaired balance in order to improve functional independence, quality of life, return to PLOF, reduce the risk for falls, and reduce pain. PLAN: plan to address unsupported balance and review/progress sitting and standing exs next visit.      DISCHARGE PLANNING DISCUSSED: Discharge to self and family under MD supervision once all goals have been met or patient has reached maximum potential. Discussed with the pt the POC to continue HHPT x 1 visit this week then 2w1 per extension approval. The pt is in agreement to the POC at this time.

## 2022-06-09 ENCOUNTER — HOME CARE VISIT (OUTPATIENT)
Dept: SCHEDULING | Facility: HOME HEALTH | Age: 67
End: 2022-06-09
Payer: MEDICARE

## 2022-06-09 VITALS
OXYGEN SATURATION: 94 % | SYSTOLIC BLOOD PRESSURE: 100 MMHG | HEART RATE: 82 BPM | TEMPERATURE: 98.3 F | DIASTOLIC BLOOD PRESSURE: 90 MMHG

## 2022-06-09 PROCEDURE — G0157 HHC PT ASSISTANT EA 15: HCPCS

## 2022-06-09 NOTE — HOME HEALTH
SUBJECTIVE: The pt states that he went out to have blood work done earlier today. The pt seen sitting in the recliner, notably fatigued. The pt and the pts wife report that the getting in to the bed is getting better - the pt states \"we have got that down pat. \" the pts wife states - \"it doesn't seem to cause as much pain. \"  -pt presents with overall lack of motivation and desire for particpation on this date. PAIN: see pain assessment    OBJECTIVE: see interventions    NEXT MD APPT: Aug 2 PCP, Aug 17 with vascular    CAREGIVER ASSISTANCE NEEDED FOR: The pt lives with his wife and 2 teenage sons. His wife works from home and assists with all care: medication management, meals, shopping, transportation, safety, ADLS/IADLs. ASSESSMENT AND PROGRESS TOWARD GOALS: The pt presents with decreased motivation for participation on this date, not following through with cues for full reps of ther exs and postural improvements. Sit to stand transfers continue to be inconsistent with assistance required from CGA to max(A). Good follow through with placement of the walker for set up with stand to sit transitions using the tape on the floor as a guide, but control of the transition continues to be with a \"flop\". The pt may benefit from a transition to outpatient PT for progressive therapy and a changed environment. PATIENT RESPONSE TO TREATMENT:  the pt has no c/o pain on this date, but has noted \"grinding\" of the knees and low back. PATIENT LEVEL OF UNDERSTANDING OF EDUCATION:  the pt verbalizes the understanding of the need for compliance with the HEP; however, continues to report non-compliance.      CONTINUED NEED FOR THE FOLLOWING SKILLS: HH PT is medically necessary to address pain, decreased ROM, decreased strength,  impaired bed mobility, decreased independence with functional transfers, impaired gait, impaired stair negotiation, and impaired balance in order to improve functional independence, quality of life, return to PLOF, reduce the risk for falls, and reduce pain. PLAN: plan to address exiting the home, next visit. DISCHARGE PLANNING DISCUSSED: Discharge to self and family under MD supervision once all goals have been met or patient has reached maximum potential. Discussed with the pt the POC to continue HHPT  2w1 per extension approval with anticipated DC to transition to outpatient PT on the second visit of the week pending MD referral. The pt is in agreement to the POC at this time.

## 2022-06-10 LAB
ALBUMIN SERPL-MCNC: 4.2 G/DL (ref 3.8–4.8)
ALP SERPL-CCNC: 71 IU/L (ref 44–121)
ALT SERPL-CCNC: 12 IU/L (ref 0–44)
AST SERPL-CCNC: 14 IU/L (ref 0–40)
BILIRUB DIRECT SERPL-MCNC: 0.14 MG/DL (ref 0–0.4)
BILIRUB SERPL-MCNC: 0.6 MG/DL (ref 0–1.2)
BUN SERPL-MCNC: 26 MG/DL (ref 8–27)
BUN/CREAT SERPL: 19 (ref 10–24)
CALCIUM SERPL-MCNC: 9.1 MG/DL (ref 8.6–10.2)
CHLORIDE SERPL-SCNC: 105 MMOL/L (ref 96–106)
CHOLEST SERPL-MCNC: 128 MG/DL (ref 100–199)
CO2 SERPL-SCNC: 17 MMOL/L (ref 20–29)
CREAT SERPL-MCNC: 1.35 MG/DL (ref 0.76–1.27)
EGFR: 58 ML/MIN/1.73
ERYTHROCYTE [DISTWIDTH] IN BLOOD BY AUTOMATED COUNT: 14.1 % (ref 11.6–15.4)
GLUCOSE SERPL-MCNC: 91 MG/DL (ref 65–99)
HCT VFR BLD AUTO: 44.5 % (ref 37.5–51)
HDLC SERPL-MCNC: 45 MG/DL
HGB BLD-MCNC: 14.6 G/DL (ref 13–17.7)
LDLC SERPL CALC-MCNC: 62 MG/DL (ref 0–99)
MCH RBC QN AUTO: 28.6 PG (ref 26.6–33)
MCHC RBC AUTO-ENTMCNC: 32.8 G/DL (ref 31.5–35.7)
MCV RBC AUTO: 87 FL (ref 79–97)
PLATELET # BLD AUTO: 232 X10E3/UL (ref 150–450)
POTASSIUM SERPL-SCNC: 4.3 MMOL/L (ref 3.5–5.2)
PROT SERPL-MCNC: 6.8 G/DL (ref 6–8.5)
RBC # BLD AUTO: 5.11 X10E6/UL (ref 4.14–5.8)
SODIUM SERPL-SCNC: 138 MMOL/L (ref 134–144)
TRIGL SERPL-MCNC: 117 MG/DL (ref 0–149)
VLDLC SERPL CALC-MCNC: 21 MG/DL (ref 5–40)
WBC # BLD AUTO: 6.6 X10E3/UL (ref 3.4–10.8)

## 2022-06-14 ENCOUNTER — HOME CARE VISIT (OUTPATIENT)
Dept: SCHEDULING | Facility: HOME HEALTH | Age: 67
End: 2022-06-14
Payer: MEDICARE

## 2022-06-14 VITALS
HEART RATE: 86 BPM | OXYGEN SATURATION: 98 % | TEMPERATURE: 98.8 F | DIASTOLIC BLOOD PRESSURE: 84 MMHG | SYSTOLIC BLOOD PRESSURE: 112 MMHG

## 2022-06-14 PROCEDURE — G0157 HHC PT ASSISTANT EA 15: HCPCS

## 2022-06-14 NOTE — HOME HEALTH
SUBJECTIVE: The pt states that he has been non-compliant with the HEP. PAIN: see pain assessment    OBJECTIVE: see interventions    NEXT MD APPT: Aug 2 PCP, Aug 17 with vascular     CAREGIVER ASSISTANCE NEEDED FOR: The pt lives with his wife and 2 teenage sons. His wife works from home and assists with all care: medication management, meals, shopping, transportation, safety, ADLS/IADLs. ASSESSMENT AND PROGRESS TOWARD GOALS: At this time the pt appears to be approaching maximum potential towards HHPT goals. The pt continues to present with weakness and balance deficits requiring increased CG support, but has improved with understanding improved functional mobility in the home with new seating arrangements requiring less assistance for sit to stand and improved ability to perform bed mobility from the right side of the bed vs the left side. The pt is admittingly non-compliant with the HEP, but may benefit from a transition to outpatient PT for more progressive PT and challenging/change in environment. Assessment and Summary of Care:   Patient's current functional status before discharge is as follows  Bed Mobility:  mod(A) for lifting the LEs on to the bed and CGA to min(A) for RSL to sit. Transfers:  transfers continue to range from CGA to mod(A) with sit to stand from the recliner with the consistent need for cues for scooting fwd in the chair and increasing anterior trunk lean. CGA to min(A) for stand to sit with cues for hand placement, SBA to CGA for sit to stand from the EOB. car transfer declined. Gait/WC mobility:  the pt continues to present with shuffled stepping pattern on the L increasingly so through narrow spaces and thresholds. Stairs: the pt and the pts wife declined the need to address the threshold step to enter and exit the home. Special Tests:  Tinetti Balance test 14/28  Recommendations:  transition to outpatient PT.      PATIENT RESPONSE TO TREATMENT:  no pain pre PT, during PT, post PT. PATIENT LEVEL OF UNDERSTANDING OF EDUCATION:  the pt verbalizes the understanding of the need for compliance with the HEP; however, continues to report non-compliance. PLAN: reassessment with Carole Darden PT with anticipated DC to transition to outpatient PT next visit. DISCHARGE PLANNING DISCUSSED: Discharge to self and family under MD supervision once all goals have been met or patient has reached maximum potential. Discussed with the pt the POC to continue HHPT x 1 visit with anticipated HHPT DC at that time with the plan to transition to outpatient PT upon orders from MD. The pt is in agreement to the POC at this time.

## 2022-06-14 NOTE — CASE COMMUNICATION
ASSESSMENT AND PROGRESS TOWARD GOALS: At this time the pt appears to be approaching maximum potential towards HHPT goals. The pt continues to present with weakness and balance deficits requiring increased CG support, but has improved with understanding improved functional mobility in the home with new seating arrangements requiring less assistance for sit to stand and improved ability to perform bed mobility from the right side of the b ed vs the left side. The pt is admittingly non-compliant with the HEP, but may benefit from a transition to outpatient PT for more progressive PT and challenging/change in environment. Assessment and Summary of Care:   Patient's current functional status before discharge is as follows  Bed Mobility:  mod(A) for lifting the LEs on to the bed and CGA to min(A) for RSL to sit. Transfers:  transfers continue to range from CGA to mod(A)  with sit to stand from the recliner with the consistent need for cues for scooting fwd in the chair and increasing anterior trunk lean. CGA to min(A) for stand to sit with cues for hand placement, SBA to CGA for sit to stand from the EOB. car transfer declined. Gait/WC mobility:  the pt continues to present with shuffled stepping pattern on the L increasingly so through narrow spaces and thresholds. Stairs: the pt and the pts wife  declined the need to address the threshold step to enter and exit the home.    Special Tests:  Tinetti Balance test 14/28  Recommendations:  transition to outpatient PT.

## 2022-06-15 ENCOUNTER — HOME CARE VISIT (OUTPATIENT)
Dept: SCHEDULING | Facility: HOME HEALTH | Age: 67
End: 2022-06-15
Payer: MEDICARE

## 2022-06-15 VITALS
SYSTOLIC BLOOD PRESSURE: 130 MMHG | OXYGEN SATURATION: 94 % | DIASTOLIC BLOOD PRESSURE: 74 MMHG | RESPIRATION RATE: 14 BRPM | HEART RATE: 83 BPM | TEMPERATURE: 96.8 F

## 2022-06-15 PROCEDURE — G0151 HHCP-SERV OF PT,EA 15 MIN: HCPCS

## 2022-06-15 NOTE — HOME HEALTH
SUBJECTIVE: I am doing well today   REQUIRES CAREGIVER ASSISTANCE FOR: transportation, medications, ADLS, IADLS   MEDICATIONS REVIEWED AND RECONCILED no changes   NEXT MD APPT: Dr. Kimmy Mills 8/2/22   ROM:B LE WFL  STRENGTH: R hip flexors 4/5 R quads and hamatrings 5/5  L hip flexos +4/5 R quads and hamstring 5/5  WOUNDS:none   BED MOBILITY:sit to supine with CGA for B LE pt demonstrated proper technqiue and sequencing. supine to sit MOD I   TRANSFERS:sit to stand from recliner chair with B UE support with SBA/CGA sit to from bed with B UE support SBA   GAIT:Pt amb 75 ft with RW with CGA/SBA with vc needed for increaed B step length and HS at inital contact. When pt takes his time and concentrates he is able to complete when he does not he has decreased L step length and B feet do not clear the floor and has a fwd flexed poture. Per PTA visit 6/9/22 The pt ambulated on carpeted surfaces x 75 ft with the RW and CGA for safety with the focus on symmetrical stepping pattern with follow through 75% of the time. The pt appears to present with improved mechanics when wearing sneakers vs socks or house shoes  BALANCE: Pt scored 14/28 on Tinetti Balance Assessment placing pt at high  risk for falls.    PATIENT RESPONE TO TX: Pt pain level remainded the same throughout tx session   PATIENT LEVEL OF UNDERSTANDING OF EDUCATION PROVIDED  Pt demonstrated and verbalized use of RW at all times when amb  PATIENT EDUCATION PROVIDED THIS VISIT: safety, HEP, walking, deep breathing,       HEP consisting of:  1) stand every waking hour at the RW with his wife   2) standing ther exs 3x/day, supine ther exs 2x/day   standing: (at the kitchen sink with the pts wife providing SBA): calf raises, R/L hip abd, R/L HS curls, mini squats, marching 2 x 10   supine: heel slides, hip abd, bridging, LS rotations 2 x 10   seated: LS/TS twists with breath, fer shoulder flex with breath, LS fold with breath in to sitting x 3 each   sit to stand x 3  Written HEP issued, patient/caregiver verbalized understanding. Patient is s/p CVA and has been treated for  strengthening, gait training, stair training, HEP training, safety training, and balance training. On [facility] 5/6/22  strength was L hip flexor, extensor, hip abductors,adductors 3/5, R hip flexor, extensor, hip abductors, adductors 4-/5, R knee flexor and extensor 4-/5, L knee flexor and extensor 3/5. On  Re Assessment 5/27/22  strength is   B hip flexors 4/5 B quads, hamstrings +4/5 B ADD/ABD 5/5 B DF/ PF 5/5 . Today at [facility] strength is R hip flexors 4/5 R quads and hamatrings 5/5  L hip flexos +4/5 R quads and hamstring 5/5. On [facility] bed mobility was Patient needed Mod A x1 with bed mobility. On Re Assessment bed mobility is supine to sit going to the L with MOD A for upper body managment. sit to supine going to the L with MIN / CGA for lower boday management. Supine to sit going to the R with SBA with min vc needed for technique and sequencing. sit to supine to the R with SBA with MIN vc needed for technqiue and sequeing. when in sideling pt needed MIN vc to keep knees together to assit with log rolling technqiue. Today at [facility] bed mobility is sit to supine with CGA for B LE pt demonstrated proper technqiue and sequencing. supine to sit MOD I. Pt made progress but did not met goal of S for transfes. On [facility] transfers were Patient needed Mod A x1 with bed, chair, toilet transfers using RW. On  Re Assessment transfers are sit to stand from recliner chair x 2 with SBA with MOD vc needed for techique and safety. vc needed to get to center of the chair priior to sitting. To reach back for surfaces for safety. sit to stand from the bed x 2 with SBA with vc needed to reach back for surfaces and not to use B LE to brace against the bed to come to standing. Per PTA visit 5/24/22 FALL RECOVERY - pt education for use of the couch to assist with floor to stand transfers.  Max(A) to get in to quadruped and max(A) for getting from the knees to the feet with max cues for hand placement on to the couch. Pt once pt up on to the LEs and UEs on the couch he no longer was able to follow cues which he attributed to pain in the L shoulder. He then required max(A) to D for finishing the tranfer which included placement in to a safe sitting position on the couch. Today at [facility] transfers are sit to stand from recliner chair with B UE support with SBA/CGA sit to from bed with B UE support SBA . On [facility] gait was Patient ambulated with RW x 30 feet with Mod A x1 to maintain upright posture and balance, demonstrated decreased step height and stride length on B feet. patient was educated with consistency in heel to toe gait pattern technique to prevent LOB and falls. On Re Assessment gait is Pt amb 50 ft x 2 with RW with CGA with fwd flexed posture when pt concentrates he is able to demonstrate reciprocal gait pattern with B feet clearing the floor with equal step length. When he is distraced he tend to have decreased step length L LE L foot does not always clear the floor during swing phase of gait. B step lengths are unequal. When turning increased A requied as pt tends to shuffle and take many small steps and was unable to self correct with vc. When pt comes to threshold or chagnes in surfaces he again will begin to shuffle and take very small steps. .  Per PTA visit 5/20/22 gait training for improved gait mechanics focusing on improved step through pattern on the L while staying within the safety limits of the walker. the pt ambulated on inside surfaces 50 ft x 2, CGA with frequent cues for L step through pattern without the ability to perform through narrow passageways and thresholds creating increased space between him and the walker. the pt begins to shuffle step without the ability to correct. Today at [facility] gait is Pt amb 75 ft with RW with CGA/SBA with vc needed for increaed B step length and HS at inital contact.  When pt takes his time and concentrates he is able to complete when he does not he has decreased L step length and B feet do not clear the floor and has a fwd flexed poture. Per PTA visit 6/9/22 The pt ambulated on carpeted surfaces x 75 ft with the RW and CGA for safety with the focus on symmetrical stepping pattern with follow through 75% of the time. The pt appears to present with improved mechanics when wearing sneakers vs socks or house shoes. Pt has made progess but did not met goal of S with amb. On [facility] Tinetti 7/28 high fall risk due to reduced strength on BLE, gait deviation and decreased efficiency of balance reactions. Patient demonstrated poor use of hip and ankle strategy during standing balance activity. Patient requires support from AD at all times for safety and stability. On Re Assessment  pt scored a 14/28 on Tinetti Balance Assessment placing pt as a high  fall riskve been met. Today at [facility] Tinetti Balance Assessment pt scored 14 /28 placing pt as high  fall risk. Pt did have more than a 4 point statistical improvment. Pt has made progress and has met  goals. Pt will cont with outpatient therapy of his choice. Pt was seen by OT services. Mr. Tracey Vargas was seen by skilled OT for 8 visits to maximize his safety and participation with self care and functional mobility. Mr. Tracey Vargas continues to need max assistance with bathing despite ongoing education on strategies to improve his participation. He has been advised to sit on a shower seat during bathing and use a hand held shower. Instead, he stands in the shower with his RW and wife performs bathing as pt is unsafe to let go of the walker. He is using rubina technique with UB and LB dressing. He requires S for UB dressing and min A for balance to pull pants up and down. He is able to don/doff footwear. If seated, Mr. Tracey Vargas is able to perform grooming with s/u assistance. Min A needed for clothing management with toileting and assistance needed for bowel hygiene.   Suggested use of a bidet; however, it is not compatible with the RTS. Mr. Yvon Yan continues to need min/mod A for sit <> stand from lower surfaces such as his couch. Suggestions made to sit on elevated surfaces when able for ease with pt transfers. Once up, he is able to ambulate with CGA. Extensive education provided on strategies to improve functional use of the L UE with daily tasks. He has been noncompliant with his HEP and does not perform constraint induced therapy. At this time, Mr. Yvon Yan has reached maximal potential with skilled OT. No further OT is indicated at this time. Pt and caregiver were urged to follow recommendations to use the shower seat during bathing and for pt to comply with L UE HEP to improve functional use with daily tasks. Dr Ibis Collier office has been notified of DC from [facility] with goals met. Pt will cont with outpatient PT of his choice.

## 2022-06-15 NOTE — Clinical Note
Patient is s/p CVA and has been treated for  strengthening, gait training, stair training, HEP training, safety training, and balance training. On [facility] 5/6/22  strength was L hip flexor, extensor, hip abductors,adductors 3/5, R hip flexor, extensor, hip abductors, adductors 4-/5, R knee flexor and extensor 4-/5, L knee flexor and extensor 3/5. On  Re Assessment 5/27/22  strength is   B hip flexors 4/5 B quads, hamstrings +4/5 B ADD/ABD 5/5 B DF/ PF 5/5 . Today at [place] strength is R hip flexors 4/5 R quads and hamatrings 5/5  L hip flexos +4/5 R quads and hamstring 5/5. On [facility] bed mobility was Patient needed Mod A x1 with bed mobility. On Re Assessment bed mobility is supine to sit going to the L with MOD A for upper body managment. sit to supine going to the L with MIN / CGA for lower boday management. Supine to sit going to the R with SBA with min vc needed for technique and sequencing. sit to supine to the R with SBA with MIN vc needed for technqiue and sequeing. when in sideling pt needed MIN vc to keep knees together to assit with log rolling technqiue. Today at [place] bed mobility is sit to supine with CGA for B LE pt demonstrated proper technqiue and sequencing. supine to sit MOD I. Pt made progress but did not met goal of S for transfes. On [facility] transfers were Patient needed Mod A x1 with bed, chair, toilet transfers using RW. On  Re Assessment transfers are sit to stand from recliner chair x 2 with SBA with MOD vc needed for techique and safety. vc needed to get to center of the chair priior to sitting. To reach back for surfaces for safety. sit to stand from the bed x 2 with SBA with vc needed to reach back for surfaces and not to use B LE to brace against the bed to come to standing. Per PTA visit 5/24/22 FALL RECOVERY - pt education for use of the couch to assist with floor to stand transfers.  Max(A) to get in to quadruped and max(A) for getting from the knees to the feet with max cues for hand placement on to the couch. Pt once pt up on to the LEs and UEs on the couch he no longer was able to follow cues which he attributed to pain in the L shoulder. He then required max(A) to D for finishing the tranfer which included placement in to a safe sitting position on the couch. Today at [place] transfers are sit to stand from recliner chair with B UE support with SBA/CGA sit to from bed with B UE support SBA . On SOC gait was Patient ambulated with RW x 30 feet with Mod A x1 to maintain upright posture and balance, demonstrated decreased step height and stride length on B feet. patient was educated with consistency in heel to toe gait pattern technique to prevent LOB and falls. On Re Assessment gait is Pt amb 50 ft x 2 with RW with CGA with fwd flexed posture when pt concentrates he is able to demonstrate reciprocal gait pattern with B feet clearing the floor with equal step length. When he is distraced he tend to have decreased step length L LE L foot does not always clear the floor during swing phase of gait. B step lengths are unequal. When turning increased A requied as pt tends to shuffle and take many small steps and was unable to self correct with vc. When pt comes to threshold or chagnes in surfaces he again will begin to shuffle and take very small steps. .  Per PTA visit 5/20/22 gait training for improved gait mechanics focusing on improved step through pattern on the L while staying within the safety limits of the walker. the pt ambulated on inside surfaces 50 ft x 2, CGA with frequent cues for L step through pattern without the ability to perform through narrow passageways and thresholds creating increased space between him and the walker. the pt begins to shuffle step without the ability to correct. Today at [place] gait is Pt amb 75 ft with RW with CGA/SBA with vc needed for increaed B step length and HS at inital contact.  When pt takes his time and concentrates he is able to complete when he does not he has decreased L step length and B feet do not clear the floor and has a fwd flexed poture. Per PTA visit 6/9/22 The pt ambulated on carpeted surfaces x 75 ft with the RW and CGA for safety with the focus on symmetrical  stepping pattern with follow through 75% of the time. The pt appears to present with improved mechanics when wearing sneakers vs socks or house shoes. Pt has made progess but did not met goal of S with amb. On [facility] Tinetti 7/28 high fall risk due to reduced strength on BLE, gait deviation and decreased efficiency of balance reactions. Patient demonstrated poor use of hip and ankle strategy during standing balance activity. Patient requires support from AD at all times for safety and stability. On Re Assessment  pt scored a 14/28 on Tinetti Balance Assessment placing pt as a high  fall riskve been met. Today at [place] Tinetti Balance Assessment pt scored 14 /28 placing pt as high  fall risk. Pt did have more than a 4 point statistical improvment. Pt has made progress and has met  goals. Pt will cont with outpatient therapy of his choice. Pt was seen by OT services. Mr. Rakesh Siegel was seen by skilled OT for 8 visits to maximize his safety and participation with self care and functional mobility. Mr. Rakesh Siegel continues to need max assistance with bathing despite ongoing education on strategies to improve his participation. He has been advised to sit on a shower seat during bathing and use a hand held shower. Instead, he stands in the shower with his RW and wife performs bathing as pt is unsafe to let go of the walker. He is using rubina technique with UB and LB dressing. He requires S for UB dressing and min A for balance to pull pants up and down. He is able to don/doff footwear. If seated, Mr. Rakesh Siegel is able to perform grooming with s/u assistance. Min A needed for clothing management with toileting and assistance needed for bowel hygiene. Suggested use of a bidet; however, it is not compatible with the RTS.   Mr. Rakesh Siegel continues to need min/mod A for sit <> stand from lower surfaces such as his couch. Suggestions made to sit on elevated surfaces when able for ease with pt transfers. Once up, he is able to ambulate with CGA. Extensive education provided on strategies to improve functional use of the L UE with daily tasks. He has been noncompliant with his HEP and does not perform constraint induced therapy. At this time, Mr. Marcelino Romano has reached maximal potential with skilled OT. No further OT is indicated at this time. Pt and caregiver were urged to follow recommendations to use the shower seat during bathing and for pt to comply with L UE HEP to improve functional use with daily tasks. Dr Kapil Gordon office has been notified of DC from [facility] with goals met. Pt will cont with outpatient PT of his choice.

## 2022-06-16 ENCOUNTER — TELEPHONE (OUTPATIENT)
Dept: CARDIOLOGY CLINIC | Age: 67
End: 2022-06-16

## 2022-06-16 ENCOUNTER — TELEPHONE (OUTPATIENT)
Dept: FAMILY MEDICINE CLINIC | Age: 67
End: 2022-06-16

## 2022-06-16 NOTE — TELEPHONE ENCOUNTER
Spoke with pt regarding lab results, advised no significant abnormalities. No questions or concerns at this time. Neuro

## 2022-06-16 NOTE — TELEPHONE ENCOUNTER
----- Message from Phoebe Myers NP sent at 6/16/2022 10:59 AM EDT -----  Labs reviewed, no significant abnormalities

## 2022-06-17 DIAGNOSIS — I10 PRIMARY HYPERTENSION: ICD-10-CM

## 2022-06-17 DIAGNOSIS — I50.22 CHRONIC SYSTOLIC CONGESTIVE HEART FAILURE (HCC): ICD-10-CM

## 2022-06-17 NOTE — TELEPHONE ENCOUNTER
Outpatient PT order has been signed by Dr. Paulino Quintero and was faxed back to [address] [place] [address]. A fax confirmation has been received.

## 2022-06-30 ENCOUNTER — HOSPITAL ENCOUNTER (OUTPATIENT)
Dept: PHYSICAL THERAPY | Age: 67
Discharge: HOME | End: 2022-06-30
Payer: MEDICARE

## 2022-06-30 PROCEDURE — 97110 THERAPEUTIC EXERCISES: CPT

## 2022-06-30 PROCEDURE — 97162 PT EVAL MOD COMPLEX 30 MIN: CPT

## 2022-06-30 NOTE — PROGRESS NOTES
[address] [place] [address] PHYSICAL THERAPY AT [facility] [address] 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 Independent with ADLs/ iADLs prior   Comorbidities: Aneurysm 2021 with residual L hemiparesis, arthritis, LBP, HTN, visual impairment   Medications: Verified on Patient Summary List     The Plan of Care and following information is based on the information from the initial evaluation.     ==================================================================================  Assessment / key information:   Alex Solorzano is a 74 y.o.  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 Then in 2022, he had a stroke which resulted in slight worsening of the left sided weakness. He has had a 1 month course of PT and now presents to this clinic for additional rehabilitation. Objectively, the patient demonstrates  the following:  Gait: ambulates with rolling walker; requiring close supervision. He has difficulty advancing the left foot. LE MMT: WNLs except B hip abd= 3+/5. L hip ext= 2/5, L hip flex= 4/5   UE MMT: L shoulder flex/ scap= 2/5 with AROM flex= 51 degrees, scap= 43 degrees.   Elbow/ forearm/ wrist= WFLs  LE Sensation: intact to light touch in the LEs  Balance: Tinetti's score: 10/28  Transfers: requires min assist with sit to stand (from standard chair), min assist with sit to supine, min graham with rolling  FOTO: 38%     Pt will benefit from PTto address these deficits, improve functional independence and reduce imbalance / risk for falls with normal daily activities. Thank you for this referral.   ===================================================================================  Eval Complexity: History MEDIUM  Complexity : 1-2 comorbidities / personal factors will impact the outcome/ POC ;  Examination  HIGH Complexity : 4+ Standardized tests and measures addressing body structure, function, activity limitation and / or participation in recreation ; Presentation MEDIUM Complexity : Evolving with changing characteristics ; Decision Making MEDIUM Complexity : FOTO score of 26-74; Overall Complexity MEDIUM  Problem List: impaired gait/ balance, decrease ADL/ functional abilitiies, decrease activity tolerance and decrease transfer abilities   Treatment Plan may include any combination of the following: Therapeutic exercise, Therapeutic activities, Neuromuscular re-education, Gait/balance training and Patient education  Patient / Family readiness to learn indicated by: asking questions, trying to perform skills and interest  Persons(s) to be included in education: patient (P)  Barriers to Learning/Limitations: None  Measures taken, if barriers to learning:    Patient Goal (s): amb with cane   Self reported health status: good  Rehabilitation Potential: fair to good   Short Term Goals: To be accomplished in 4- weeks:  1. Patient will report at least 25% reduction of symptoms with ADLs. 2. Patient will be independent and complaint with HEP TID to reduce imbalance and dizziness with ADLs.   3. Tinetti's will improve to greater than or equal to 12/28 points to demonstrate reduction of imbalance with ADLs.  Long Term Goals: To be accomplished in 8 weeks:  1. Patient will report at least 50% reduction of symptoms with ADLs. 2. Patient will be independent with self progression of HEP and demonstrate willingness to continue HEP after D/C to maximize/maintain gains in functional mobility. 3. Tinetti's will improve to greater than or equal to 16/28 points to demonstrate significant reduction of imbalance with ADLs. 4. Patient to be independent with all transfers  5. Patient to ambulate independently >= 300 feet with least restrictive assistive device  Frequency / Duration:   Patient to be seen  2-3  times per week for 4-8  weeks:  Patient / Caregiver education and instruction: self care, activity modification and exercises    Therapist Signature: Carlota Sarmiento PT Date: 2/39/5753   Certification Period: 6/30/22 to 9/27/22 Time: 1:45 PM   ===========================================================================================  I certify that the above Physical Therapy Services are being furnished while the patient is under my care. I agree with the treatment plan and certify that this therapy is necessary. Physician Signature:        Date:       Time:       Farhat Monteiro MD  Please sign and return to [facility] or you may fax the signed copy to [phone]. Thank you.

## 2022-06-30 NOTE — PROGRESS NOTES
PHYSICAL THERAPY - DAILY TREATMENT NOTE     Patient Name: Pedro Archuleta        Date: 2022  : 1955   YES Patient  Verified  Visit #:   1     Insurance: Payor: On[employer]imo [employer]ale[employer] / Plan: [employer] / Product Type: Managed Care Medicare /      In time: 850 Out time: 940   Total Treatment Time: 50     Medicare/BCBS Time Tracking (below)   Total Timed Codes (min):  20 1:1 Treatment Time:  50     TREATMENT AREA =  Monoplegia of lower limb following cerebral infarction affecting right dominant side [I69.341]    SUBJECTIVE    Pain Level (on 0 to 10 scale):  4  / 10   Medication Changes/New allergies or changes in medical history, any new surgeries or procedures?     NO    If yes, update Summary List   Subjective Functional Status/Changes:  []  No changes reported     See eval /POC         OBJECTIVE  Modalities Rationale:     decrease pain to improve patient's ability to return to PLOF      min [] Estim, type/location:                                      []  att     []  unatt     []  w/US     []  w/ice    []  w/heat    min []  Mechanical Traction: type/lbs                   []  pro   []  sup   []  int   []  cont    []  before manual    []  after manual    min []  Ultrasound, settings/location:      min []  Iontophoresis w/ dexamethasone, location:                                               []  take home patch       []  in clinic    min []  Ice     []  Heat    location/position:     min []  Vasopneumatic Device, press/temp:     min []  Other:    [] Skin assessment post-treatment (if applicable):    []  intact    []  redness- no adverse reaction     []redness - adverse reaction:      10 min Therapeutic Exercise:  [x]  See flow sheet   Rationale:      increase ROM and increase strength to improve the patients ability to return to PLOF      min Manual Therapy: Technique:      [] S/DTM []IASTM []PROM [] Passive Stretching   []manual TPR    []Jt manipulation:Gr I [] II []  III [] IV[]  []REIL with manual OP  Treatment Area:     Rationale:      decrease pain, increase ROM, increase tissue extensibility and decrease trigger points to improve patient's ability to return to PLOF    10 min Neuromuscular Re-ed: [x]  See flow sheet   Rationale:      improve coordination, improve balance, increase proprioception and dec dizziness to improve the patients ability to return to PLOF        min Self Care:    Rationale:    increase ROM, increase strength and improve coordination to improve the patients ability to return to PLOF    Billed With/As:   [] TE   [] TA   [] Neuro   [] Self Care Patient Education: [x] Review HEP    [] Progressed/Changed HEP based on:   [] positioning   [] body mechanics   [] transfers   [] heat/ice application    [] other:        Other Objective/Functional Measures:    See eval/ POC     Post Treatment Pain Level (on 0 to 10) scale:   4  / 10     ASSESSMENT    X  See POC     PLAN    [x]  Upgrade activities as tolerated {YES) Continue plan of care   []  Discharge due to :    []  Other:      Therapist: Markel Forbes PT    Date: 6/30/2022 Time: 9:43 AM     Future Appointments   Date Time Provider Ivette Funez   8/2/2022  1:00 PM Shirley Cobb MD FP BS AMB   11/3/2022 11:00 AM Ricardo Valdez MD CAP BS AMB

## 2022-07-05 ENCOUNTER — HOSPITAL ENCOUNTER (OUTPATIENT)
Dept: PHYSICAL THERAPY | Age: 67
Discharge: HOME | End: 2022-07-05
Payer: MEDICARE

## 2022-07-05 PROCEDURE — 97110 THERAPEUTIC EXERCISES: CPT

## 2022-07-05 PROCEDURE — 97530 THERAPEUTIC ACTIVITIES: CPT

## 2022-07-05 PROCEDURE — 97112 NEUROMUSCULAR REEDUCATION: CPT

## 2022-07-05 NOTE — PROGRESS NOTES
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 NO    If yes, update Summary List   Subjective Functional Status/Changes:  []  No changes reported     Pt reports his back hurts and makes it hard for transfers. OBJECTIVE    15 min Therapeutic Exercise:  [x]  See flow sheet   Rationale:      increase strength and improve coordination to improve the patients ability to perform ADLs without pain       10 min Therapeutic Activity: [x]  See flow sheet   Rationale:    functional activities to improve the patients ability to perform ADLs without pain    15 min Neuromuscular Re-ed: [x]  See flow sheet   Rationale:    improve coordination, improve balance and increase proprioception to improve the patients ability to perform ADLs without pain     min Gait Training:  ___ feet with ___ device on level surfaces with ___ level of assistance   Rationale: To improve ambulation safety and efficiency in order to improve patient's ability to safely ambulate at home for self care.     Billed With/As:   [] TE   [] TA   [] Neuro   [] Self Care Patient Education: [x] Review HEP    [] Progressed/Changed HEP based on:   [] positioning   [] body mechanics   [] transfers   [] heat/ice application    [] other:        Other Objective/Functional Measures:    Difficulty walking. Decreased step length and toe clearance during ambulation on the L   Post Treatment Pain Level (on 0 to 10) scale:   4  / 10     ASSESSMENT  Assessment/Changes in Function:     Pt required mod to max verbal cueing for increased step sherif     []  See Progress Note/Recertification   Patient will continue to benefit from skilled PT services to address functional mobility deficits, address ROM deficits, address strength deficits and analyze and cue movement patterns to attain remaining goals.    Progress toward goals / Updated goals:    Fair Progress to    [] STG    [x] LTG  5 as shown by pt still having difficulty with walking long distances      PLAN  [x]  Upgrade activities as tolerated YES Continue plan of care   []  Discharge due to :    []  Other:      Therapist: Kita Garibay DPT     Date: 7/5/2022 Time: 12:15 PM        Future Appointments   Date Time Provider Ivette Funez   7/7/2022  4:30 PM Viera Decree, PTA [facility]   7/11/2022 10:15 AM Moses Clap, PT [facility]   7/13/2022  9:45 AM Viera Decrezachary, PTA [facility]   7/15/2022  9:45 AM Moses Clap, PT [facility]   7/18/2022 11:45 AM Moses Clap, PT [facility]   7/20/2022  9:30 AM Moses Clap, PT [facility]   7/22/2022  9:45 AM Viera Decree, PTA [facility]   7/25/2022 11:45 AM Moses Clap, PT [facility]   7/27/2022  9:30 AM Moses Clap, PT [facility]   7/29/2022  9:45 AM Moses Clap, PT [facility]   8/2/2022  1:00 PM Izabella Berumen MD FP BS AMB   11/3/2022 11:00 AM Gio Baltazar MD CAP BS AMB

## 2022-07-07 ENCOUNTER — HOSPITAL ENCOUNTER (OUTPATIENT)
Dept: PHYSICAL THERAPY | Age: 67
Discharge: HOME | End: 2022-07-07
Payer: MEDICARE

## 2022-07-07 PROCEDURE — 97110 THERAPEUTIC EXERCISES: CPT

## 2022-07-07 PROCEDURE — 97530 THERAPEUTIC ACTIVITIES: CPT

## 2022-07-07 PROCEDURE — 97112 NEUROMUSCULAR REEDUCATION: CPT

## 2022-07-07 NOTE — PROGRESS NOTES
PHYSICAL THERAPY - DAILY TREATMENT NOTE     Patient Name: Ashlie Coronado        Date: 2022  : 1955   YES Patient  Verified  Visit #:   3   of   12  Insurance: Payor: Shiela Simmons / Plan: "[employer] / Product Type: Managed Care Medicare /      In time: 4:30 pm Out time: 5:20 pm   Total Treatment Time: 40     Medicare/BCBS Time Tracking (below)   Total Timed Codes (min):  50 1:1 Treatment Time:  40     TREATMENT AREA =  Right leg weakness [R29.898]    SUBJECTIVE    Pain Level (on 0 to 10 scale):  0 / 10   Medication Changes/New allergies or changes in medical history, any new surgeries or procedures? NO    If yes, update Summary List   Subjective Functional Status/Changes:  []  No changes reported     Pt reports performing HEP daily. OBJECTIVE  Modalities Rationale: n/a    27 min Therapeutic Exercise:  [x]  See flow sheet   Rationale:      increase ROM and increase strength to improve the patients ability to  perform self care    14 min Therapeutic Activity: [x] sit to stand body mechanics, transfer training from mat to RW, safety with turns with use of RW   Rationale:      increase ROM, increase strength and improve coordination to improve the patients ability to  perform self care     9 min Neuromuscular Re-ed: [x]  See flow sheet   Rationale:      increase strength, improve coordination, improve balance and increase proprioception to improve the patients ability to perform self care     Billed With/As:   [] TE   [] TA   [] Neuro   [] Self Care Patient Education: [x] Review HEP    [] Progressed/Changed HEP based on:   [] positioning   [] body mechanics   [] transfers   [] heat/ice application    [] other:        Other Objective/Functional Measures:   Add limits of stability in static standing     Post Treatment Pain Level (on 0 to 10) scale:   0 / 10     ASSESSMENT    Assessment/Changes in Function:   Review proper trunk control and hip shifting to improve sit to stand tranfers   Pt requires extensive VCs for sequencing and proper form in all exercises, hand placement in transfers and safety with use of RW. Pt reporting mild dizziness with EC in static standing that resolved with short sitting rest.   BP: 131/90; pulse O2: 96%. Pt then able to complete treatment without reports of dizziness. []  See Progress Note/Recertification   Patient will continue to benefit from skilled PT services to modify and progress therapeutic interventions, address functional mobility deficits, address ROM deficits, address strength deficits, analyze and address soft tissue restrictions, analyze and cue movement patterns, analyze and modify body mechanics/ergonomics, assess and modify postural abnormalities, address imbalance/dizziness and instruct in home and community integration to attain remaining goals. Progress toward goals / Updated goals:    Slow Progress to    All STGs currently in progress. Partially met STG for HEP.        PLAN    [x]  Upgrade activities as tolerated YES Continue plan of care   []  Discharge due to :    []  Other:      Therapist: Christi Medley PTA    Date: 7/7/2022 Time: 5:20  PM     Future Appointments   Date Time Provider Ivette Funez   7/11/2022 10:15 AM Douglas Leon, PT [facility]   7/13/2022  9:45 AM Sujata Finney PTA [facility]   7/15/2022  9:45 AM Douglas Leon, PT [facility]   7/18/2022 11:45 AM Douglas Leon PT [facility]   7/20/2022  9:30 AM Douglas Leon PT [facility]   7/22/2022  9:45 AM Sujata Finney PTA [facility]   7/25/2022 11:45 AM Douglas Leon PT [facility]   7/27/2022  9:30 AM Douglas Leon PT [facility]   7/29/2022  9:45 AM Douglas Leon PT [facility]   8/2/2022  1:00 PM Chanel Pham MD [facility] BS AMB   11/3/2022 11:00 AM Tootie Pizarro MD [facility] BS AMB

## 2022-07-11 ENCOUNTER — HOSPITAL ENCOUNTER (OUTPATIENT)
Dept: PHYSICAL THERAPY | Age: 67
Discharge: HOME | End: 2022-07-11
Payer: MEDICARE

## 2022-07-11 PROCEDURE — 97116 GAIT TRAINING THERAPY: CPT

## 2022-07-11 PROCEDURE — 97110 THERAPEUTIC EXERCISES: CPT

## 2022-07-11 PROCEDURE — 97112 NEUROMUSCULAR REEDUCATION: CPT

## 2022-07-11 NOTE — PROGRESS NOTES
PHYSICAL THERAPY - DAILY TREATMENT NOTE     Patient Name: Theresa Sapp        Date: 2022  : 1955   YES Patient  Verified  Visit #:     Insurance: Payor: Peewee Mcqueen / Plan: [employer] / Product Type: [employer] Medicare /      In time: 2160 Out time: 1100   Total Treatment Time: 45     Medicare/BCBS Time Tracking (below)   Total Timed Codes (min):  45 1:1 Treatment Time:  45     TREATMENT AREA =  Right leg weakness [R29.898]    SUBJECTIVE    Pain Level (on 0 to 10 scale):  0  / 10   Medication Changes/New allergies or changes in medical history, any new surgeries or procedures? NO    If yes, update Summary List   Subjective Functional Status/Changes:  []  No changes reported     Legs feel really heavy today         OBJECTIVE      15 min Therapeutic Exercise:  [x]  See flow sheet   Rationale:      increase ROM and increase strength to improve the patients ability to perform ADLs     15 min Neuromuscular Re-ed: [x]  See flow sheet   Rationale:      improve coordination, improve balance and increase proprioception to improve the patients ability to perform ADLs       15 min Gait Training:  _40 x3__ feet with _RW__ device on level surfaces with __min A_ level of assistance   Rationale: To improve ambulation safety and efficiency in order to improve patient's ability to safely ambulate at home for self care. Billed With/As:   [] TE   [] TA   [] Neuro   [] Self Care Patient Education: [x] Review HEP    [] Progressed/Changed HEP based on:   [] positioning   [] body mechanics   [] transfers   [] heat/ice application    [] other:        Other Objective/Functional Measures:    Difficulty advancing L LE during amb--verbla cues and min assist    Sit to supine with mod Assist; pt with sign LBP this day    Requires several rest periods.   Reporting LBP and fatigue/ heavieness of LEs   Post Treatment Pain Level (on 0 to 10) scale:   0  / 10 ASSESSMENT    Assessment/Changes in Function:     Pt with diminished tolerance for therapy, requiring more assist and freq rest periods     []  See Progress Note/Recertification   Patient will continue to benefit from skilled PT services to modify and progress therapeutic interventions, address functional mobility deficits, address ROM deficits, address strength deficits, analyze and cue movement patterns, address imbalance/dizziness and instruct in home and community integration to attain remaining goals.       Progress toward goals / Updated goals:         []  See Progress Note/Recertification    PLAN    [x]  Upgrade activities as tolerated {YES) Continue plan of care   []  Discharge due to :    []  Other:      Therapist: Divya Landaverde PT    Date: 7/11/2022 Time: 10:18 AM     Future Appointments   Date Time Provider Ivette Funez   7/13/2022  9:45 AM Adeola Hernandez, PTA [facility]   7/15/2022  9:45 AM Héctor Merino, PT [facility]   7/18/2022 11:45 AM Héctor Merino, PT [facility]   7/20/2022  9:30 AM Héctor Merino, PT [facility]   7/22/2022  9:45 AM Adeola Hernandez, PTA [facility]   7/25/2022 11:45 AM Héctor Merino, PT [facility]   7/27/2022  9:30 AM Héctor Merino, PT [facility]   7/29/2022  9:45 AM Héctor Merino, PT [facility]   8/2/2022  1:00 PM Tiffany Johnson MD [facility] BS AMB   11/3/2022 11:00 AM Verito Bolanos MD [facility] BS AMB

## 2022-07-13 ENCOUNTER — HOSPITAL ENCOUNTER (OUTPATIENT)
Dept: PHYSICAL THERAPY | Age: 67
Discharge: HOME | End: 2022-07-13
Payer: MEDICARE

## 2022-07-13 PROCEDURE — 97110 THERAPEUTIC EXERCISES: CPT

## 2022-07-13 PROCEDURE — 97112 NEUROMUSCULAR REEDUCATION: CPT

## 2022-07-13 NOTE — PROGRESS NOTES
PHYSICAL THERAPY - DAILY TREATMENT NOTE     Patient Name: Neelima Stone        Date: 2022  : 1955   YES Patient  Verified  Visit #:     Insurance: Payor: Stanislav Mcfarland / Plan: [address] / Product Type: Managed Care Medicare /      In time: 10:00 am Out time: 10:48   Total Treatment Time: 48     Medicare/BCBS Time Tracking (below)   Total Timed Codes (min): 48 1:1 Treatment Time:  42     TREATMENT AREA =  Right leg weakness [R29.898]    SUBJECTIVE    Pain Level (on 0 to 10 scale):  6  / 10   Medication Changes/New allergies or changes in medical history, any new surgeries or procedures? NO    If yes, update Summary List   Subjective Functional Status/Changes:  []  No changes reported     Pt reports his LB has been bothering him more lately. Doing amb with RW in home         OBJECTIVE  Modalities Rationale:   N/a    33 min Therapeutic Exercise:  [x]  See flow sheet   Rationale:      increase ROM, increase strength, improve coordination, improve balance, increase proprioception and safety in ADLs to improve the patients ability to perform functional ADLs         15 min Neuromuscular Re-ed: [x]  See flow sheet   Rationale:      increase ROM, increase strength, improve coordination, improve balance and increase proprioception to improve the patients ability to perform self care         Billed With/As:   [] TE   [] TA   [] Neuro   [] Self Care Patient Education: [x] Review HEP    [] Progressed/Changed HEP based on:   [] positioning   [] body mechanics   [] transfers   [] heat/ice application    [] other:        Other Objective/Functional Measures:     Moderate assist sit to stand from standard height chair; VCs to stay within walker frame , difficulty advancing LLE     Post Treatment Pain Level (on 0 to 10) scale:   4 / 10     ASSESSMENT    Assessment/Changes in Function:   Mild LBP with sit to stand; min A for proper standing weight shift Static standing feet apart ~ 27 seconds without UE assist  NMR for static and dynamic postures, trunk flexion, equal weight bearing through trunk to improve neutral posture in sitting. Pt has tendency to lean and weight shift toward right in sitting; Rhythmic stabs in sitting neutral posture, trunk flex/ext with and without use of small SB f     []  See Progress Note/Recertification   Patient will continue to benefit from skilled PT services to modify and progress therapeutic interventions, address functional mobility deficits, address ROM deficits, address strength deficits, analyze and address soft tissue restrictions, analyze and cue movement patterns, analyze and modify body mechanics/ergonomics, assess and modify postural abnormalities, address imbalance/dizziness and instruct in home and community integration to attain remaining goals.       Progress toward goals / Updated goals:    Good Progress to    [x] STG    [] LTG  1 as shown by improving stability in static standing       PLAN    [x]  Upgrade activities as tolerated YES Continue plan of care   []  Discharge due to :    []  Other:      Therapist: Tanna Martinez PTA    Date: 7/13/2022 Time: 10:48 AM     Future Appointments   Date Time Provider Ivette Funez   7/15/2022  9:45 AM Becki Bar, PT [facility]   7/18/2022 11:45 AM Becki Bar, PT [facility]   7/20/2022  9:30 AM Becki Bar, PT [facility]   7/22/2022  9:45 AM Marco Mcnair PTA [facility]   7/25/2022 11:45 AM Becki Bar, PT [facility]   7/27/2022  9:30 AM Becki Bar, PT [facility]   7/29/2022  9:45 AM Becki Bar, PT [facility]   8/2/2022  1:00 PM MD DANETTE QureshiFP BS AMB   11/3/2022 11:00 AM Liseth Saucedo MD [facility] BS AMB

## 2022-07-15 ENCOUNTER — HOSPITAL ENCOUNTER (OUTPATIENT)
Dept: PHYSICAL THERAPY | Age: 67
Discharge: HOME | End: 2022-07-15
Payer: MEDICARE

## 2022-07-15 PROCEDURE — 97116 GAIT TRAINING THERAPY: CPT

## 2022-07-15 PROCEDURE — 97110 THERAPEUTIC EXERCISES: CPT

## 2022-07-15 PROCEDURE — 97112 NEUROMUSCULAR REEDUCATION: CPT

## 2022-07-15 NOTE — PROGRESS NOTES
PHYSICAL THERAPY - DAILY TREATMENT NOTE     Patient Name: Evette Kumar        Date: 7/15/2022  : 1955   YES Patient  Verified  Visit #:     Insurance: Payor: Perla Zhou / Plan: [employer] [address] / Product Type: Managed Care Medicare /      In time: 596 Out time: 6215   Total Treatment Time: 50     Medicare/BCBS Time Tracking (below)   Total Timed Codes (min):  50 1:1 Treatment Time:  50     TREATMENT AREA =  Right leg weakness [R29.898]    SUBJECTIVE    Pain Level (on 0 to 10 scale):  0  / 10   Medication Changes/New allergies or changes in medical history, any new surgeries or procedures? NO    If yes, update Summary List   Subjective Functional Status/Changes:  []  No changes reported     LB has been paiful         OBJECTIVE    15 min Therapeutic Exercise:  [x]  See flow sheet   Rationale:      increase ROM and increase strength to improve the patients ability to perform ADLs     20 min Neuromuscular Re-ed: [x]  See flow sheet   Rationale:      improve coordination, improve balance and increase proprioception to improve the patients ability to amb/ transition safely       15 min Gait Training:  _40 x2, 100' x 1__ feet with _RW__ device on level surfaces with __min A/ close supervision_ level of assistance   Rationale: To improve ambulation safety and efficiency in order to improve patient's ability to safely ambulate at home for self care. Billed With/As:   [] TE   [] TA   [] Neuro   [] Self Care Patient Education: [x] Review HEP    [] Progressed/Changed HEP based on:   [] positioning   [] body mechanics   [] transfers   [] heat/ice application    [] other:        Other Objective/Functional Measures:    Requires verbal cues to stay within walker during amb and to fully advance L LE.   Patient fatigues easily, requiring more assistance for safety    Sit to stand with min A/ CGA    Added standing reaches   Post Treatment Pain Level (on 0 to 10) scale:   0  / 10     ASSESSMENT    Assessment/Changes in Function:     progressed distance with amb    Progressing standing balance     []  See Progress Note/Recertification   Patient will continue to benefit from skilled PT services to modify and progress therapeutic interventions, address functional mobility deficits, address ROM deficits, address strength deficits, address imbalance/dizziness and instruct in home and community integration to attain remaining goals.       Progress toward goals / Updated goals:    Improved distance and independence with amb       []  See Progress Note/Recertification    PLAN    [x]  Upgrade activities as tolerated {YES) Continue plan of care   []  Discharge due to :    []  Other:      Therapist: Roselle Burkitt, PT    Date: 7/15/2022 Time: 9:47 AM     Future Appointments   Date Time Provider Ivette Funez   7/18/2022 11:45 AM Loki Odom, PT [facility]   7/20/2022  9:30 AM Loki Odom, PT [facility]   7/22/2022  9:45 AM Cheri Aguilera, PTA [facility]   7/25/2022 11:45 AM Loki Odom, PT [facility]   7/27/2022  9:30 AM Loki Odom, PT [facility]   7/29/2022  9:45 AM Loki Odom, PT [facility]   8/2/2022  1:00 PM Gilberto Clements MD [facility] BS AMB   11/3/2022 11:00 AM Akira Velasquez MD [facility] BS AMB

## 2022-07-18 ENCOUNTER — HOSPITAL ENCOUNTER (OUTPATIENT)
Dept: PHYSICAL THERAPY | Age: 67
Discharge: HOME | End: 2022-07-18
Payer: MEDICARE

## 2022-07-18 PROCEDURE — 97112 NEUROMUSCULAR REEDUCATION: CPT

## 2022-07-18 PROCEDURE — 97110 THERAPEUTIC EXERCISES: CPT

## 2022-07-18 PROCEDURE — 97116 GAIT TRAINING THERAPY: CPT

## 2022-07-18 NOTE — PROGRESS NOTES
PHYSICAL THERAPY - DAILY TREATMENT NOTE     Patient Name: Milagro Ortiz        Date: 2022  : 1955   YES Patient  Verified  Visit #:     Insurance: Payor: Jayshree Ayala / Plan: [employer] / Product Type: Managed Care Medicare /      In time: 4692 Out time: 3418   Total Treatment Time: 50     Medicare/BCBS Time Tracking (below)   Total Timed Codes (min):  45 1:1 Treatment Time:  45     TREATMENT AREA =  Right leg weakness [R29.898]    SUBJECTIVE    Pain Level (on 0 to 10 scale):  0  / 10   Medication Changes/New allergies or changes in medical history, any new surgeries or procedures? NO    If yes, update Summary List   Subjective Functional Status/Changes:  []  No changes reported     Seeing vascular doctor tomorrow         OBJECTIVE    15 min Therapeutic Exercise:  [x]? See flow sheet   Rationale:      increase ROM and increase strength to improve the patients ability to perform ADLs      20/15 min Neuromuscular Re-ed: [x]? See flow sheet   Rationale:      improve coordination, improve balance and increase proprioception to improve the patients ability to amb/ transition safely         15 min Gait Training:  _30 x2, 100' x 1__ feet with _RW__ device on level surfaces with  close supervision_ level of assistance   Rationale: To improve ambulation safety and efficiency in order to improve patient's ability to safely ambulate at home for self care. Billed With/As:   [] TE   [] TA   [] Neuro   [] Self Care Patient Education: [x] Review HEP    [] Progressed/Changed HEP based on:   [] positioning   [] body mechanics   [] transfers   [] heat/ice application    [] other:        Other Objective/Functional Measures:    Improved stability with gait, but slow doug and verbal cues to stay in walker, advance L LE fully.   Fatigues easily    Attempted table exercises but pt with sign spasming/ pain after transfer to supine   Post Treatment Pain Level (on 0 to 10) scale:   0  / 10     ASSESSMENT    Assessment/Changes in Function:     Slow but steady progress with ambulation. Limited by fatigue     []  See Progress Note/Recertification   Patient will continue to benefit from skilled PT services to modify and progress therapeutic interventions, address functional mobility deficits, address ROM deficits, address strength deficits, analyze and address soft tissue restrictions, analyze and cue movement patterns, address imbalance/dizziness and instruct in home and community integration to attain remaining goals.       Progress toward goals / Updated goals:    amb with improved independence       []  See Progress Note/Recertification    PLAN    [x]  Upgrade activities as tolerated {YES) Continue plan of care   []  Discharge due to :    []  Other:      Therapist: Awilda Horne PT    Date: 7/18/2022 Time: 11:44 AM     Future Appointments   Date Time Provider Ivette Funez   7/18/2022 11:45 AM Rebecca Paez PT [facility]   7/20/2022  9:30 AM Rebecca Paez PT [facility]   7/22/2022  9:45 AM Matthew Snyder, PTA [facility]   7/25/2022 11:45 AM Rebecca Paez PT [facility]   7/27/2022  9:30 AM Rebecca Paez PT [facility]   7/29/2022  9:45 AM Rebecca Paez PT [facility]   8/2/2022  1:00 PM MD DANETTE RussoFP BS AMB   11/3/2022 11:00 AM Ashish Jj MD [facility] BS AMB

## 2022-07-20 ENCOUNTER — HOSPITAL ENCOUNTER (OUTPATIENT)
Dept: PHYSICAL THERAPY | Age: 67
Discharge: HOME | End: 2022-07-20
Payer: MEDICARE

## 2022-07-20 PROCEDURE — 97116 GAIT TRAINING THERAPY: CPT

## 2022-07-20 PROCEDURE — 97112 NEUROMUSCULAR REEDUCATION: CPT

## 2022-07-20 PROCEDURE — 97110 THERAPEUTIC EXERCISES: CPT

## 2022-07-20 NOTE — PROGRESS NOTES
PHYSICAL THERAPY - DAILY TREATMENT NOTE     Patient Name: Jordy Hays        Date: 2022  : 1955   YES Patient  Verified  Visit #:     Insurance: Payor: [employer] MEDICARE / Plan: [address] / Product Type: Managed Care Medicare /      In time: 338 Out time: 1020   Total Treatment Time: 45     Medicare/BCBS Time Tracking (below)   Total Timed Codes (min):  45 1:1 Treatment Time:  45     TREATMENT AREA =  Right leg weakness [R29.898]    SUBJECTIVE    Pain Level (on 0 to 10 scale):  0  / 10   Medication Changes/New allergies or changes in medical history, any new surgeries or procedures? NO    If yes, update Summary List   Subjective Functional Status/Changes:  []  No changes reported     Vascular surgeon yesterday, did US on both legs. Results today. Things seem the same         OBJECTIVE      15 min Therapeutic Exercise:  [x]  See flow sheet   Rationale:      increase ROM and increase strength to improve the patients ability to perform ADLs/ amb     15 min Neuromuscular Re-ed: [x]  See flow sheet   Rationale:      improve coordination, improve balance, and increase proprioception to improve the patients ability to perform ADLs/ amb     15 min Gait Training:  _100 x1, 50 x 1__ feet with _RW__ device on level surfaces with _S__ level of assistance   Rationale: To improve ambulation safety and efficiency in order to improve patient's ability to safely ambulate at home for self care. Billed With/As:   [] TE   [] TA   [] Neuro   [] Self Care Patient Education: [x] Review HEP    [] Progressed/Changed HEP based on:   [] positioning   [] body mechanics   [] transfers   [] heat/ice application    [] other:        Other Objective/Functional Measures:    Improving doug with amb, less rest periods.   Improving ability to advance L LE.  Mild neglect noted with change of direction    Verbal cues to slow pace with there ex   Post Treatment Pain Level (on 0 to 10) scale:   0  / 10     ASSESSMENT    Assessment/Changes in Function:     Improving gait; better endurance     []  See Progress Note/Recertification   Patient will continue to benefit from skilled PT services to modify and progress therapeutic interventions, address functional mobility deficits, address ROM deficits, address strength deficits, address imbalance/dizziness, and instruct in home and community integration to attain remaining goals.       Progress toward goals / Updated goals:    Good Progress to    [] STG    [x] LTG  5 as shown by pt demo       []  See Progress Note/Recertification    PLAN    [x]  Upgrade activities as tolerated {YES) Continue plan of care   []  Discharge due to :    []  Other:      Therapist: Trinh Urbina PT    Date: 7/20/2022 Time: 9:33 AM     Future Appointments   Date Time Provider Ivette Funez   7/22/2022  9:45 AM Denia Neves PTA [facility]   7/25/2022 11:45 AM Bel Oseguera PT [facility]   7/27/2022  9:30 AM Bel Oseguera PT [facility]   7/29/2022  9:45 AM Bel Oseguera PT [facility]   8/2/2022  1:00 PM MD DANETTE RamirezFP BS AMB   11/3/2022 11:00 AM Iliana Xie MD [facility] BS AMB

## 2022-07-22 ENCOUNTER — HOSPITAL ENCOUNTER (OUTPATIENT)
Dept: PHYSICAL THERAPY | Age: 67
Discharge: HOME | End: 2022-07-22
Payer: MEDICARE

## 2022-07-22 PROCEDURE — 97110 THERAPEUTIC EXERCISES: CPT

## 2022-07-22 PROCEDURE — 97530 THERAPEUTIC ACTIVITIES: CPT

## 2022-07-22 PROCEDURE — 97112 NEUROMUSCULAR REEDUCATION: CPT

## 2022-07-22 NOTE — PROGRESS NOTES
PHYSICAL THERAPY - DAILY TREATMENT NOTE     Patient Name: Yen Sender        Date: 2022  : 1955   YES Patient  Verified  Visit #:     Insurance: Payor: Marga Hart / Plan: [address] / Product Type: Managed Care Medicare /      In time: 9:46 Out time: 10:25   Total Treatment Time: 39     Medicare/BCBS Time Tracking (below)   Total Timed Codes (min):  39 1:1 Treatment Time:  39     TREATMENT AREA =  Right leg weakness [R29.898]    SUBJECTIVE    Pain Level (on 0 to 10 scale):  0  / 10   Medication Changes/New allergies or changes in medical history, any new surgeries or procedures?     NO    If yes, update Summary List   Subjective Functional Status/Changes:  []  No changes reported     Pt reports he woke up tired today         OBJECTIVE  Modalities Rationale:  na      10 min Therapeutic Exercise:  [x]  See flow sheet   Rationale:      increase ROM and increase strength to improve the patients ability to perform functional ADLs     8 min Therapeutic Activity: [x]  See flow sheet   Rationale:      increase ROM, increase strength, improve coordination, improve balance, and increase proprioception to improve the patients ability to perform functional ADLs      21 min Neuromuscular Re-ed: [x]  See flow sheet   Rationale:      increase ROM, increase strength, improve coordination, improve balance, and increase proprioception to improve the patients ability to perform functional ADLs        Billed With/As:   [] TE   [] TA   [] Neuro   [] Self Care Patient Education: [x] Review HEP    [] Progressed/Changed HEP based on:   [] positioning   [] body mechanics   [] transfers   [] heat/ice application    [] other:        Other Objective/Functional Measures:    Sit to stand mechanics from standard chair and blue foam to RW education     Post Treatment Pain Level (on 0 to 10) scale:   0  / 10     ASSESSMENT    Assessment/Changes in Function:     Pt progressing from mod A to Min A with VCs and demo for weight shift, hand placement, VCs for safety from standard chair with blue foam to RW  Pt slightly fatigued with static standing balance     []  See Progress Note/Recertification   Patient will continue to benefit from skilled PT services to modify and progress therapeutic interventions, address functional mobility deficits, address ROM deficits, address strength deficits, analyze and address soft tissue restrictions, analyze and cue movement patterns, analyze and modify body mechanics/ergonomics, assess and modify postural abnormalities, address imbalance/dizziness, and instruct in home and community integration to attain remaining goals.       Progress toward goals / Updated goals:    Good Progress to    [x] STG    [] LTG  1 -3 as shown by improving sit to stand, ambulation safety with use of RW       PLAN    [x]  Upgrade activities as tolerated YES Continue plan of care   []  Discharge due to :    []  Other:      Therapist: Milli Pratt PTA    Date: 7/22/2022 Time: 10:25 AM     Future Appointments   Date Time Provider Ivette Funez   7/25/2022 11:45 AM Jesus Fitch PT [facility]   7/27/2022  9:30 AM Jesus Fitch PT [facility]   7/29/2022  9:45 AM Jesus Fitch PT [facility]   8/2/2022  1:00 PM MD DANETTE SheaFP BS AMB   11/3/2022 11:00 AM Jaclyn Dumont MD [facility] BS AMB

## 2022-07-25 ENCOUNTER — HOSPITAL ENCOUNTER (OUTPATIENT)
Dept: PHYSICAL THERAPY | Age: 67
Discharge: HOME | End: 2022-07-25
Payer: MEDICARE

## 2022-07-25 PROCEDURE — 97110 THERAPEUTIC EXERCISES: CPT

## 2022-07-25 PROCEDURE — 97116 GAIT TRAINING THERAPY: CPT

## 2022-07-25 PROCEDURE — 97112 NEUROMUSCULAR REEDUCATION: CPT

## 2022-07-25 NOTE — PROGRESS NOTES
PHYSICAL THERAPY - DAILY TREATMENT NOTE     Patient Name: Neelima Stone        Date: 2022  : 1955   YES Patient  Verified  Visit #:   10   of   12  Insurance: Payor: Stanislav Mcfarland / Plan: [id number] / Product Type: Managed Care Medicare /      In time: 1150 Out time: 1240   Total Treatment Time: 50     Medicare/BCBS Time Tracking (below)   Total Timed Codes (min):  50 1:1 Treatment Time:  50     TREATMENT AREA =  Right leg weakness [R29.898]    SUBJECTIVE    Pain Level (on 0 to 10 scale):  0  / 10   Medication Changes/New allergies or changes in medical history, any new surgeries or procedures? NO    If yes, update Summary List   Subjective Functional Status/Changes:  []  No changes reported     Easier to move around. Able to walk farther. Overall- 40% improved         OBJECTIVE       20 min Therapeutic Exercise:  [x]  See flow sheet   Rationale:      increase ROM and increase strength to improve the patients ability to perform ADLs/ amb      15 min Neuromuscular Re-ed: [x]  See flow sheet   Rationale:      improve coordination, improve balance, and increase proprioception to improve the patients ability to perform ADLs/ amb      15 min Gait Training:  _100 x1, 50 x 1__ feet with _RW__ device on level surfaces with _S__ level of assistance   Rationale: To improve ambulation safety and efficiency in order to improve patient's ability to safely ambulate at home for self care.       Billed With/As:   [] TE   [] TA   [] Neuro   [] Self Care Patient Education: [x] Review HEP    [] Progressed/Changed HEP based on:   [] positioning   [] body mechanics   [] transfers   [] heat/ice application    [] other:        Other Objective/Functional Measures:    See PN   Post Treatment Pain Level (on 0 to 10) scale:   0  / 10     ASSESSMENT        PLAN    [x]  Upgrade activities as tolerated {YES) Continue plan of care   []  Discharge due to :    []  Other: Therapist: Wayne Hairston PT    Date: 7/25/2022 Time: 11:48 AM     Future Appointments   Date Time Provider Ivette Funez   7/27/2022  9:30 AM Dee Dee Isaac, PT [facility]   7/29/2022  9:45 AM Dee Dee Isaac, PT [facility]   8/2/2022  1:00 PM Eric Hendricks MD [facility] BS AMB   11/3/2022 11:00 AM Sirena Anderson MD [facility] BS AMB

## 2022-07-25 NOTE — PROGRESS NOTES
[address] [facility] PHYSICAL THERAPY AT [facility] [address] 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 Independent with ADLs/ iADLs prior   Comorbidities: Aneurysm 2021 with residual L hemiparesis, arthritis, LBP, HTN, visual impairment   Medications: Verified on Patient Summary List   Visits from [facility]: 10 Missed Visits: -     Previous Goals:  1. Patient will report at least 25% reduction of symptoms with ADLs. 2. Patient will be independent and complaint with HEP TID to reduce imbalance and dizziness with ADLs. 3. Tinetti's will improve to greater than or equal to  points to demonstrate reduction of imbalance with ADLs. Prior Level/Current Level:  1) Prior Level: na   Current Level: 40%   Goal Met? yes  2) Prior Level: na   Current Level: indep with current HEP   Goal Met? yes  3) Prior Level: 10/28   Current Level:    Goal Met? yes    Key Functional Changes/Progress: Patient is progressing steadily towards established goals, meeting all STGs. He reports improved ease of transfers and ambulation using rolling walker. Distance he is able to ambulate has increased, but is limited to household distances. He continues with difficulty performing transfers and limited ambulation duration. He continues with balance dysfunction, placing him at risk for falls.   Problem List: pain affecting function, decrease ROM, decrease strength, edema affecting function, impaired gait/ balance, decrease ADL/ functional abilitiies, decrease activity tolerance, and decrease transfer abilities   Treatment Plan may include any combination of the following: Therapeutic exercise, Therapeutic activities, Neuromuscular re-education, Gait/balance training, Patient education, Self Care training, and Home safety training  Patient Goal(s) has been updated and includes:     Goals for this certification period include and are to be achieved in   4-5  weeks:    Frequency / Duration:   Patient to be seen   2-3   times per week for   4    weeks:  1. Patient will report at least 50% reduction of symptoms with ADLs. 2. Patient will be independent with self progression of HEP and demonstrate willingness to continue HEP after D/C to maximize/maintain gains in functional mobility. 3. Tinetti's will improve to greater than or equal to 16/28 points to demonstrate significant reduction of imbalance with ADLs. 4. Patient to be independent with all transfers  5. Patient to ambulate independently >= 300 feet with least restrictive assistive device  Assessments/Recommendations: continue PT per current POC  If you have any questions/comments please contact us directly at [phone]. Thank you for allowing us to assist in the care of your patient. Therapist Signature: Carlota Sarmiento PT Date: 2/75/9871   Certification Period:  Reporting Period: 7/25/22 to 10/22/22  6/30/22 to 7/25/22 Time: 1:23 PM   NOTE TO PHYSICIAN:  PLEASE COMPLETE THE ORDERS BELOW AND FAX TO   [facility]: [phone].   If you are unable to process this request in 24 hours please contact our office: [phone].    ___ I have read the above report and request that my patient continue as recommended.   ___ I have read the above report and request that my patient continue therapy with the following changes/special instructions: ________________________________________________   ___ I have read the above report and request that my patient be discharged from therapy.      Physician Signature:        Date:       Time:    Nicola Shea MD.

## 2022-07-27 ENCOUNTER — HOSPITAL ENCOUNTER (OUTPATIENT)
Dept: PHYSICAL THERAPY | Age: 67
Discharge: HOME | End: 2022-07-27
Payer: MEDICARE

## 2022-07-27 PROCEDURE — 97110 THERAPEUTIC EXERCISES: CPT

## 2022-07-27 PROCEDURE — 97116 GAIT TRAINING THERAPY: CPT

## 2022-07-27 PROCEDURE — 97112 NEUROMUSCULAR REEDUCATION: CPT

## 2022-07-27 NOTE — PROGRESS NOTES
PHYSICAL THERAPY - DAILY TREATMENT NOTE     Patient Name: Neelima Stone        Date: 2022  : 1955   YES Patient  Verified  Visit #:     Insurance: Payor: [employer] / Plan: [address] / Product Type: Managed Care Medicare /      In time: 650 Out time: 1020   Total Treatment Time: 45     Medicare/BCBS Time Tracking (below)   Total Timed Codes (min):  45 1:1 Treatment Time:  45     TREATMENT AREA =  Right leg weakness [R29.898]    SUBJECTIVE    Pain Level (on 0 to 10 scale):  0  / 10   Medication Changes/New allergies or changes in medical history, any new surgeries or procedures? NO    If yes, update Summary List   Subjective Functional Status/Changes:  []  No changes reported     All my joints feel stiff         OBJECTIVE    20 min Therapeutic Exercise:  [x]  See flow sheet   Rationale:      increase ROM and increase strength to improve the patients ability to perform ADLs/ amb      15 min Neuromuscular Re-ed: [x]  See flow sheet   Rationale:      improve coordination, improve balance, and increase proprioception to improve the patients ability to perform ADLs/ amb        10 min Gait Training:  _130__ feet with _RW__ device on level surfaces with __close S_ level of assistance   Rationale: To improve ambulation safety and efficiency in order to improve patient's ability to safely ambulate at home for self care.     Billed With/As:   [] TE   [] TA   [] Neuro   [] Self Care Patient Education: [x] Review HEP    [] Progressed/Changed HEP based on:   [] positioning   [] body mechanics   [] transfers   [] heat/ice application    [] other:        Other Objective/Functional Measures:    Sit to stand Indep 1/5 tries    Difficulty with changing directions during gait;  improving doug   Post Treatment Pain Level (on 0 to 10) scale:   0  / 10     ASSESSMENT    Assessment/Changes in Function:     Improving sit to stand transfers     []  See Progress Note/Recertification   Patient will continue to benefit from skilled PT services to modify and progress therapeutic interventions, address functional mobility deficits, address ROM deficits, address strength deficits, address imbalance/dizziness, and instruct in home and community integration to attain remaining goals.       Progress toward goals / Updated goals:    Improving transfers, gait       []  See Progress Note/Recertification    PLAN    [x]  Upgrade activities as tolerated {YES) Continue plan of care   []  Discharge due to :    []  Other:      Therapist: Car De La Vega PT    Date: 7/27/2022 Time: 9:52 AM     Future Appointments   Date Time Provider Ivette Funez   7/29/2022  9:45 AM Jesus Fitch, HO [facility]   8/2/2022  1:00 PM Michelle Bowen MD [facility] BS AMB   11/3/2022 11:00 AM Jaclyn Dumont MD [facility] BS AMB

## 2022-07-29 ENCOUNTER — HOSPITAL ENCOUNTER (OUTPATIENT)
Dept: PHYSICAL THERAPY | Age: 67
Discharge: HOME | End: 2022-07-29
Payer: MEDICARE

## 2022-07-29 PROCEDURE — 97116 GAIT TRAINING THERAPY: CPT

## 2022-07-29 PROCEDURE — 97112 NEUROMUSCULAR REEDUCATION: CPT

## 2022-07-29 PROCEDURE — 97110 THERAPEUTIC EXERCISES: CPT

## 2022-07-29 NOTE — PROGRESS NOTES
PHYSICAL THERAPY - DAILY TREATMENT NOTE     Patient Name: Dione Lyn        Date: 2022  : 1955   YES Patient  Verified  Visit #:     Insurance: Payor: [employer] / Plan: [address] / Product Type: Managed Care Medicare /      In time: 918 Out time: 7170   Total Treatment Time: 50     Medicare/BCBS Time Tracking (below)   Total Timed Codes (min):  50 1:1 Treatment Time:  50     TREATMENT AREA =  Right leg weakness [R29.898]    SUBJECTIVE    Pain Level (on 0 to 10 scale):  0  / 10   Medication Changes/New allergies or changes in medical history, any new surgeries or procedures? NO    If yes, update Summary List   Subjective Functional Status/Changes:  []  No changes reported     Not too bad. LISSETTE brito         OBJECTIVE    20 min Therapeutic Exercise:  [x]  See flow sheet   Rationale:      increase ROM and increase strength to improve the patients ability to perform ADLs/ amb      15 min Neuromuscular Re-ed: [x]  See flow sheet   Rationale:      improve coordination, improve balance, and increase proprioception to improve the patients ability to perform ADLs/ amb         15 min Gait Training:  _175_x1, 50 x 1_ feet with _RW__ device on level surfaces with __close S_ level of assistance   Rationale: To improve ambulation safety and efficiency in order to improve patient's ability to safely ambulate at home for self care.      Billed With/As:   [] TE   [] TA   [] Neuro   [] Self Care Patient Education: [x] Review HEP    [] Progressed/Changed HEP based on:   [] positioning   [] body mechanics   [] transfers   [] heat/ice application    [] other:        Other Objective/Functional Measures:    Able to achieve sit to stand indep on4/5 tries    Added side steps to assist with turning during amb    Difficulty with motor planning zo noted when changing directions/ turning to sit   Post Treatment Pain Level (on 0 to 10) scale:   0  / 10 ASSESSMENT    Assessment/Changes in Function:     Improved ability to transfer sit to stand, inc distance with amb     []  See Progress Note/Recertification   Patient will continue to benefit from skilled PT services to modify and progress therapeutic interventions, address functional mobility deficits, address ROM deficits, address strength deficits, address imbalance/dizziness, and instruct in home and community integration to attain remaining goals.       Progress toward goals / Updated goals:    Improved function noted with transfers and distanc ewith amb       []  See Progress Note/Recertification    PLAN    [x]  Upgrade activities as tolerated {YES) Continue plan of care   []  Discharge due to :    []  Other:      Therapist: Darryl Deras, PT    Date: 7/29/2022 Time: 9:44 AM     Future Appointments   Date Time Provider Ivette Funez   7/29/2022  9:45 AM London Corrales, PT [facility]   8/1/2022  2:00 PM Janee Winteror, PTA [facility]   8/2/2022  1:00 PM Usha Galvez MD [facility] BS AMB   8/3/2022 11:45 AM London Corrales PT [facility]   8/5/2022  9:45 AM Janee Winteror, PTA [facility]   8/8/2022 12:00 PM Janee Florinda, PTA [facility]   8/10/2022  1:15 PM Janee Bryant, PTA [facility]   8/12/2022  9:45 AM Janee Florinda, PTA [facility]   11/3/2022 11:00 AM Roderick Vaughan MD [facility] BS AMB

## 2022-08-01 ENCOUNTER — HOSPITAL ENCOUNTER (OUTPATIENT)
Dept: PHYSICAL THERAPY | Age: 67
Discharge: HOME | End: 2022-08-01
Payer: MEDICARE

## 2022-08-01 PROCEDURE — 97530 THERAPEUTIC ACTIVITIES: CPT

## 2022-08-01 PROCEDURE — 97110 THERAPEUTIC EXERCISES: CPT

## 2022-08-01 PROCEDURE — 97112 NEUROMUSCULAR REEDUCATION: CPT

## 2022-08-01 NOTE — PROGRESS NOTES
PHYSICAL THERAPY - DAILY TREATMENT NOTE     Patient Name: Josh Alejandro        Date: 2022  : 1955   YES Patient  Verified  Visit #:   15   of   20  Insurance: Payor: Vishal Sprague / Plan: [address] / Product Type: Managed Care Medicare /      In time: 2:10 Out time: 3:10   Total Treatment Time: 60     Medicare/BCBS Time Tracking (below)   Total Timed Codes (min):  60 1:1 Treatment Time:  54     TREATMENT AREA =  Right leg weakness [R29.898]    SUBJECTIVE    Pain Level (on 0 to 10 scale):  0  / 10   Medication Changes/New allergies or changes in medical history, any new surgeries or procedures? NO    If yes, update Summary List   Subjective Functional Status/Changes:  []  No changes reported     Pt reports his LB hurts today. Per wife -pt seems to be feeling fatigue  F/U with MD tomorrow       OBJECTIVE  Modalities Rationale:  n/a      35 min Therapeutic Exercise:  [x]  See flow sheet   Rationale:      increase ROM, increase strength, and improve coordination to improve the patients ability to perform ADLs     13 min Therapeutic Activity: [x]  Sit to stand from chair with foam to RW, from mat to chair with VCs for proper hand placement and trunk shift   Rationale:      improve coordination, improve balance, and increase proprioception to improve the patients ability to perform sit to stand from standard height chairs     12 min Neuromuscular Re-ed: [x]  See flow sheet   Rationale:      increase ROM, increase strength, improve coordination, improve balance, and increase proprioception to improve the patients ability to decrease fall risk         Billed With/As:   [] TE   [] TA   [] Neuro   [] Self Care Patient Education: [x] Review HEP    [] Progressed/Changed HEP based on:   [] positioning   [] body mechanics   [] transfers   [] heat/ice application    [] other:        Other Objective/Functional Measures:     Add mini squats, static standing ~ 30 seconds to 1 min x 4      Post Treatment Pain Level (on 0 to 10) scale:   0  / 10     ASSESSMENT    Assessment/Changes in Function:     Extensive VCs for safety in transfers, sit to stand, increased step length during gait. Recommended to consult with MD regarding LBP     []  See Progress Note/Recertification   Patient will continue to benefit from skilled PT services to modify and progress therapeutic interventions, address functional mobility deficits, address strength deficits, analyze and address soft tissue restrictions, analyze and cue movement patterns, analyze and modify body mechanics/ergonomics, assess and modify postural abnormalities, and instruct in home and community integration to attain remaining goals.       Progress toward goals / Updated goals:    Slow Progress to    [] STG    [x] LTG  1-2 as shown by improving sit to stand from higher surfaces, varies from moderate A to SBA during session       PLAN    [x]  Upgrade activities as tolerated YES Continue plan of care   []  Discharge due to :    []  Other:      Therapist: Tanna Martinez PTA    Date: 8/1/2022 Time: 3: 10 PM     Future Appointments   Date Time Provider Ivette Funez   8/1/2022  2:00 PM Marco Mcnair PTA [facility]   8/2/2022  1:00 PM Dixon Wise MD [facility] BS AMB   8/3/2022 11:45 AM Becki Hester PT [facility]   8/5/2022  9:45 AM Marco Mcnair PTA [facility]   8/8/2022 12:00 PM Marco Mcnair PTA [facility]   8/10/2022  1:15 PM Marco Mcnair PTA [facility]   8/12/2022  9:45 AM Marco Mcnair PTA [facility]   11/3/2022 11:00 AM Liseth Saucedo MD [facility] BS AMB

## 2022-08-02 ENCOUNTER — OFFICE VISIT (OUTPATIENT)
Dept: FAMILY MEDICINE CLINIC | Age: 67
End: 2022-08-02
Payer: MEDICARE

## 2022-08-02 VITALS
OXYGEN SATURATION: 67 % | BODY MASS INDEX: 27.09 KG/M2 | SYSTOLIC BLOOD PRESSURE: 110 MMHG | RESPIRATION RATE: 16 BRPM | HEIGHT: 74 IN | TEMPERATURE: 98 F | DIASTOLIC BLOOD PRESSURE: 80 MMHG | HEART RATE: 93 BPM

## 2022-08-02 DIAGNOSIS — Z00.00 MEDICARE ANNUAL WELLNESS VISIT, SUBSEQUENT: ICD-10-CM

## 2022-08-02 DIAGNOSIS — I72.4 POPLITEAL ARTERY ANEURYSM, BILATERAL (HCC): ICD-10-CM

## 2022-08-02 DIAGNOSIS — I10 PRIMARY HYPERTENSION: ICD-10-CM

## 2022-08-02 DIAGNOSIS — N18.30 STAGE 3 CHRONIC KIDNEY DISEASE, UNSPECIFIED WHETHER STAGE 3A OR 3B CKD (HCC): ICD-10-CM

## 2022-08-02 DIAGNOSIS — Z12.5 SCREENING FOR PROSTATE CANCER: ICD-10-CM

## 2022-08-02 DIAGNOSIS — E78.5 DYSLIPIDEMIA: ICD-10-CM

## 2022-08-02 DIAGNOSIS — I63.9 ACUTE CVA (CEREBROVASCULAR ACCIDENT) (HCC): Primary | ICD-10-CM

## 2022-08-02 DIAGNOSIS — Z98.2 S/P VP SHUNT: ICD-10-CM

## 2022-08-02 PROCEDURE — G8427 DOCREV CUR MEDS BY ELIG CLIN: HCPCS | Performed by: FAMILY MEDICINE

## 2022-08-02 PROCEDURE — G8417 CALC BMI ABV UP PARAM F/U: HCPCS | Performed by: FAMILY MEDICINE

## 2022-08-02 PROCEDURE — 1123F ACP DISCUSS/DSCN MKR DOCD: CPT | Performed by: FAMILY MEDICINE

## 2022-08-02 PROCEDURE — G8754 DIAS BP LESS 90: HCPCS | Performed by: FAMILY MEDICINE

## 2022-08-02 PROCEDURE — G0439 PPPS, SUBSEQ VISIT: HCPCS | Performed by: FAMILY MEDICINE

## 2022-08-02 PROCEDURE — 99214 OFFICE O/P EST MOD 30 MIN: CPT | Performed by: FAMILY MEDICINE

## 2022-08-02 PROCEDURE — G8752 SYS BP LESS 140: HCPCS | Performed by: FAMILY MEDICINE

## 2022-08-02 PROCEDURE — G8536 NO DOC ELDER MAL SCRN: HCPCS | Performed by: FAMILY MEDICINE

## 2022-08-02 PROCEDURE — G8510 SCR DEP NEG, NO PLAN REQD: HCPCS | Performed by: FAMILY MEDICINE

## 2022-08-02 PROCEDURE — 1101F PT FALLS ASSESS-DOCD LE1/YR: CPT | Performed by: FAMILY MEDICINE

## 2022-08-02 PROCEDURE — 3017F COLORECTAL CA SCREEN DOC REV: CPT | Performed by: FAMILY MEDICINE

## 2022-08-02 RX ORDER — CLOPIDOGREL BISULFATE 75 MG/1
75 TABLET ORAL DAILY
COMMUNITY
Start: 2022-05-12

## 2022-08-02 NOTE — PROGRESS NOTES
This is the Subsequent Medicare Annual Wellness Exam, performed 12 months or more after the Initial AWV or the last Subsequent AWV    I have reviewed the patient's medical history in detail and updated the computerized patient record. Assessment/Plan   Education and counseling provided:  Are appropriate based on today's review and evaluation  Discussed 5-year health plan. Discussed ACP. See notes from today  1. [facility]  Comments:  left side residual weakness , upper and lower ext. walking with walker. need help with ADL. 2. Stage 3 chronic kidney disease, unspecified whether stage 3a or 3b CKD ([facility]  3. Primary hypertension  4. S/P  shunt  5. Dyslipidemia  6. Medicare annual wellness visit, subsequent       Depression Risk Factor Screening     3 most recent PHQ Screens 8/2/2022   Little interest or pleasure in doing things Not at all   Feeling down, depressed, irritable, or hopeless Not at all   Total Score PHQ 2 0       Alcohol & Drug Abuse Risk Screen    Do you average more than 1 drink per night or more than 7 drinks a week: No    In the past three months have you have had more than 4 drinks containing alcohol on one occasion: No          Functional Ability and Level of Safety    Hearing: Hearing is good. Activities of Daily Living: The home contains:  shower chair,   Patient needs help with:  transportation, shopping, preparing meals, laundry, housework, managing medications, dressing, bathing, bathroom needs, and walking      Ambulation:  with walker      Fall Risk:  Fall Risk Assessment, last 12 mths 8/2/2022   Able to walk? Yes   Fall in past 12 months? 0   Do you feel unsteady?  1   Are you worried about falling 1      Abuse Screen:  Patient is not abused       Cognitive Screening    Has your family/caregiver stated any concerns about your memory: no      Health Maintenance Due     Health Maintenance Due   Topic Date Due    Hepatitis C Screening  Never done Pneumococcal 65+ years (1 - PCV) Never done    Colorectal Cancer Screening Combo  Never done    Shingrix Vaccine Age 50> (1 of 2) Never done    COVID-19 Vaccine (3 - Booster for Pfizer series) 09/24/2021    DTaP/Tdap/Td series (2 - Td or Tdap) 07/27/2022   Refused all due immunization. Agreed to do fit test for colon cancer screening  Will order PSA with next labs. Patient Care Team:  Fatoumata Kwon MD as PCP - General (Family Medicine)  Fatoumata Kwon MD as PCP - [facility] Provider  Iliana Xie MD as Physician (Cardiovascular Disease Physician)  Patrice Carranza MD as Physician (Nephrology)    History     Patient Active Problem List   Diagnosis Code    Primary hypertension I10    History of recent pneumonia Z87.01    S/P  shunt Z98.2    Non-ST elevation MI (NSTEMI) ([facility] I21.4    Renal insufficiency Q63.6    Systolic dysfunction D04.1    Aneurysm ([address] I72.9    Nonrheumatic aortic valve insufficiency I35.1    Chronic renal disease, stage III N18.30     Past Medical History:   Diagnosis Date    Essential hypertension     SAH (subarachnoid hemorrhage) ([facility] [address] 09/2021    Right-sided EVD placement for hydrocephalus    Spinal stenosis       Past Surgical History:   Procedure Laterality Date    HX APPENDECTOMY       Current Outpatient Medications   Medication Sig Dispense Refill    clopidogreL (PLAVIX) 75 mg tab Take 75 mg by mouth in the morning. sacubitriL-valsartan (Entresto) 49-51 mg tab tablet Take 1 Tablet by mouth two (2) times a day. 60 Tablet 3    empagliflozin (JARDIANCE) 10 mg tablet Take 10 mg by mouth daily. atorvastatin (Lipitor) 40 mg tablet Take 1 Tablet by mouth daily. 90 Tablet 1    hydrALAZINE (APRESOLINE) 25 mg tablet Take 1 Tablet by mouth three (3) times daily for 90 days. 270 Tablet 1    Famotidine-Ca Carb-Mag Hydrox (Pepcid Complete) -165 mg chew Take 1 Tablet by mouth daily as needed (reflux ).       empagliflozin (Jardiance) 10 mg tablet Take 1 Tablet by mouth daily. 30 Tablet 6    nitroglycerin (NITROSTAT) 0.4 mg SL tablet 0.4 mg by SubLINGual route every five (5) minutes as needed for Chest Pain. every 5 minutes for 3 doses, call 911 if chest pain is unresolved       acetaminophen (TYLENOL) 325 mg tablet Take 325 mg by mouth every four (4) hours as needed for Pain. Comp Stocking,Knee,Regular,Sml misc 2 Box by Does Not Apply route daily. Use while upright  Indications: edema (Patient not taking: No sig reported) 2 Each 0    melatonin 3 mg tablet Take 3 mg by mouth. PRN      polyethylene glycol (MIRALAX) 17 gram packet Take 17 g by mouth daily.  dissolve in 8 oz of water        No Known Allergies    Family History   Problem Relation Age of Onset    Heart Disease Father         mi at age 77    Heart Attack Father 72    Sudden Death Maternal Grandfather 59        Had brain aneurysm    Stroke Neg Hx      Social History     Tobacco Use    Smoking status: Former     Packs/day: 1.00     Types: Cigarettes     Quit date: 10/5/2021     Years since quittin.8    Smokeless tobacco: Never   Substance Use Topics    Alcohol use: Not Currently     Comment: socially; <1/month         Jama Crowe MD

## 2022-08-02 NOTE — PROGRESS NOTES
1. \"Have you been to the ER, urgent care clinic since your last visit? Hospitalized since your last visit? \" No    2. \"Have you seen or consulted any other health care providers outside of the [address] [place] [address] since your last visit? \" Yes Dr. Marce Balderas Vascular Surgery LOV: 7/20/2022    3. For patients aged 39-70: Has the patient had a colonoscopy / FIT/ Cologuard?  No     Chief Complaint   Patient presents with    Cerebrovascular Accident     Acute CVA    Tremors    Other     Bilateral popliteal artery aneurysm

## 2022-08-02 NOTE — PROGRESS NOTES
HISTORY OF PRESENT ILLNESS  Shen Love is a 74 y.o. male. HPI: Here for follow-up. Accompanied with his wife. History of aneurysm repair. Post with patient. Recent CVA. Mild left-sided residual weakness. Currently walking with walker. Need minimal help with ADL. Sitting comfortable without any acute distress. CKD. Avoiding NSAIDs  No urinary complaints. History of ischemic cardiomyopathy. No signs of volume overload. Hypertension. Vitals been stable. Popliteal artery aneurysm. Reviewed notes from cardiovascular surgeon. Currently recommended to repeat the Doppler study and follow-up in 6 months. He is on risk factor management. On statin and Plavix. Taking medication with compliance and no side effects. Visit Vitals  /80 (BP 1 Location: Right upper arm, BP Patient Position: Sitting, BP Cuff Size: Adult)   Pulse 93   Temp 98 °F (36.7 °C) (Temporal)   Resp 16   Ht 6' 2\" (1.88 m)   SpO2 (!) 67%   BMI 27.09 kg/m²     Review medication list, vitals, problem list,allergies. Lab Results   Component Value Date/Time    WBC 6.6 06/09/2022 09:46 AM    HGB 14.6 06/09/2022 09:46 AM    HCT 44.5 06/09/2022 09:46 AM    PLATELET 573 00/68/9268 09:46 AM    MCV 87 06/09/2022 09:46 AM     Lab Results   Component Value Date/Time    Sodium 138 06/09/2022 09:46 AM    Potassium 4.3 06/09/2022 09:46 AM    Chloride 105 06/09/2022 09:46 AM    CO2 17 (L) 06/09/2022 09:46 AM    Glucose 91 06/09/2022 09:46 AM    BUN 26 06/09/2022 09:46 AM    Creatinine 1.35 (H) 06/09/2022 09:46 AM    BUN/Creatinine ratio 19 06/09/2022 09:46 AM    GFR est AA 66 02/15/2022 10:13 AM    GFR est non-AA 57 (L) 02/15/2022 10:13 AM    Calcium 9.1 06/09/2022 09:46 AM    Bilirubin, total 0.6 06/09/2022 09:46 AM    Alk.  phosphatase 71 06/09/2022 09:46 AM    Protein, total 6.8 06/09/2022 09:46 AM    Albumin 4.2 06/09/2022 09:46 AM    ALT (SGPT) 12 06/09/2022 09:46 AM    AST (SGOT) 14 06/09/2022 09:46 AM     Lab Results   Component Value Date/Time    Cholesterol, total 128 06/09/2022 09:46 AM    HDL Cholesterol 45 06/09/2022 09:46 AM    LDL, calculated 62 06/09/2022 09:46 AM    VLDL, calculated 21 06/09/2022 09:46 AM    Triglyceride 117 06/09/2022 09:46 AM     Lab Results   Component Value Date/Time    TSH 3.480 01/07/2022 10:31 AM       ROS: See HPI    Physical Exam  Cardiovascular:      Rate and Rhythm: Normal rate. Pulmonary:      Effort: Pulmonary effort is normal. No respiratory distress. Breath sounds: No wheezing. Abdominal:      Tenderness: There is no abdominal tenderness. Neurological:      Mental Status: He is alert and oriented to person, place, and time. Comments: Sitting in wheelchair. Psychiatric:         Behavior: Behavior normal.       ASSESSMENT and PLAN    ICD-10-CM ICD-9-CM    1. Acute CVA (cerebrovascular accident) [facility]): Risk factor management. On statin and Plavix. Following neurology I63.9 434.91     left side residual weakness , upper and lower ext. walking with walker. need help with ADL. 2. Stage 3 chronic kidney disease, unspecified whether stage 3a or 3b CKD ([facility] [address]): Avoiding NSAIDs. Will observe O94.59 576.6 METABOLIC PANEL, COMPREHENSIVE      3. Primary hypertension:well controlled. Continue current dose of medication and low salt diet. Exercise as tolerated. . I10 401.9       4. S/P  shunt  Z98.2 V45.2       5. Dyslipidemia: On statin. Discussed lifestyle and diet modification X76.4 811.1 METABOLIC PANEL, COMPREHENSIVE      6. Medicare annual wellness visit, subsequent  Z00.00 V70.0       7. Screening for prostate cancer  Z12.5 V76.44 PSA SCREENING (SCREENING)      8. Poplitial artery aneurysm: seen cardiovascular surgeon. For now repeat doppler in 6 months and follow up after test with vascular specialist.  Continue risk factor management with plavix and statin. Pt and wife both understood and agree with the plan. follwo up in 4 months.      Please note that this dictation was completed with Dragon, the computer voice recognition software. Quite often unanticipated grammatical, syntax, homophones, and other interpretive errors are inadvertently transcribed by the computer software. Please disregard these errors. Please excuse any errors that have escaped final proofreading.

## 2022-08-02 NOTE — ACP (ADVANCE CARE PLANNING)
Advance Care Planning     General Advance Care Planning (ACP) Conversation      Date of Conversation: 8/2/2022  Conducted with: Patient with Decision Making Capacity    Healthcare Decision Maker:     Primary Decision Maker: Irene Griffiths - Spouse - [phone]  Click here to complete [address] including selection of the Healthcare Decision Maker Relationship (ie \"Primary\")    Today we discussed advanced directive. He has not decided yet. Sable Res his wife would make a decision for him.      Content/Action Overview:   See HPI   Reviewed DNR/DNI and patient   Length of Voluntary ACP Conversation in minutes:  <16 minutes (Non-Billable)    Ana Angulo MD

## 2022-08-02 NOTE — PATIENT INSTRUCTIONS
Medicare Wellness Visit, Male    The best way to live healthy is to have a lifestyle where you eat a well-balanced diet, exercise regularly, limit alcohol use, and quit all forms of tobacco/nicotine, if applicable. Regular preventive services are another way to keep healthy. Preventive services (vaccines, screening tests, monitoring & exams) can help personalize your care plan, which helps you manage your own care. Screening tests can find health problems at the earliest stages, when they are easiest to treat. [facility] follows the current, evidence-based guidelines published by the [facility] Saud Kentrell ([facility]) when recommending preventive services for our patients. Because we follow these guidelines, sometimes recommendations change over time as research supports it. (For example, a prostate screening blood test is no longer routinely recommended for men with no symptoms). Of course, you and your doctor may decide to screen more often for some diseases, based on your risk and co-morbidities (chronic disease you are already diagnosed with). Preventive services for you include:  - Medicare offers their members a free annual wellness visit, which is time for you and your primary care provider to discuss and plan for your preventive service needs. Take advantage of this benefit every year!  -All adults over age 72 should receive the recommended pneumonia vaccines. Current USPSTF guidelines recommend a series of two vaccines for the best pneumonia protection.   -All adults should have a flu vaccine yearly and tetanus vaccine every 10 years.  -All adults age 48 and older should receive the shingles vaccines (series of two vaccines).        -All adults age 38-68 who are overweight should have a diabetes screening test once every three years.   -Other screening tests & preventive services for persons with diabetes include: an eye exam to screen for diabetic retinopathy, a kidney function test, a foot exam, and stricter control over your cholesterol.   -Cardiovascular screening for adults with routine risk involves an electrocardiogram (ECG) at intervals determined by the provider.   -Colorectal cancer screening should be done for adults age 54-65 with no increased risk factors for colorectal cancer. There are a number of acceptable methods of screening for this type of cancer. Each test has its own benefits and drawbacks. Discuss with your provider what is most appropriate for you during your annual wellness visit. The different tests include: colonoscopy (considered the best screening method), a fecal occult blood test, a fecal DNA test, and sigmoidoscopy.  -All adults born between [facility] should be screened once for Hepatitis C.  -An Abdominal Aortic Aneurysm (AAA) Screening is recommended for men age 73-68 who has ever smoked in their lifetime.      Here is a list of your current Health Maintenance items (your personalized list of preventive services) with a due date:  Health Maintenance Due   Topic Date Due    Hepatitis C Test  Never done    Pneumococcal Vaccine (1 - PCV) Never done    Colorectal Screening  Never done    Shingles Vaccine (1 of 2) Never done    COVID-19 Vaccine (3 - Booster for Pfizer series) 09/24/2021    DTaP/Tdap/Td  (2 - Td or Tdap) 07/27/2022

## 2022-08-03 ENCOUNTER — HOSPITAL ENCOUNTER (OUTPATIENT)
Dept: PHYSICAL THERAPY | Age: 67
Discharge: HOME | End: 2022-08-03
Payer: MEDICARE

## 2022-08-03 PROCEDURE — 97112 NEUROMUSCULAR REEDUCATION: CPT

## 2022-08-03 PROCEDURE — 97116 GAIT TRAINING THERAPY: CPT

## 2022-08-03 PROCEDURE — 97110 THERAPEUTIC EXERCISES: CPT

## 2022-08-03 NOTE — PROGRESS NOTES
PHYSICAL THERAPY - DAILY TREATMENT NOTE     Patient Name: Darryn Negron        Date: 8/3/2022  : 1955   YES Patient  Verified  Visit #:     Insurance: Payor: Kristin Bustos / Plan: [employer] / Product Type: [employer] Medicare /      In time: 1150 Out time: 1240   Total Treatment Time: 50     Medicare/BCBS Time Tracking (below)   Total Timed Codes (min):  50 1:1 Treatment Time:  50     TREATMENT AREA =  Right leg weakness [R29.898]    SUBJECTIVE    Pain Level (on 0 to 10 scale):  -  / 10   Medication Changes/New allergies or changes in medical history, any new surgeries or procedures? NO    If yes, update Summary List   Subjective Functional Status/Changes:  []  No changes reported     Saw MD.  Would not approve treatment for LB. Frustrated         OBJECTIVE    20 min Therapeutic Exercise:  [x]  See flow sheet   Rationale:      increase ROM and increase strength to improve the patients ability to perform ADLs/ amb      15 min Neuromuscular Re-ed: [x]  See flow sheet   Rationale:      improve coordination, improve balance, and increase proprioception to improve the patients ability to perform ADLs/ amb         15 min Gait Training:  _125_x1, 50 x 1_ feet with _RW__ device on level surfaces with __close S_ level of assistance   Rationale: To improve ambulation safety and efficiency in order to improve patient's ability to safely ambulate at home for self care. Billed With/As:   [] TE   [] TA   [] Neuro   [] Self Care Patient Education: [x] Review HEP    [] Progressed/Changed HEP based on:   [] positioning   [] body mechanics   [] transfers   [] heat/ice application    [] other:        Other Objective/Functional Measures:    Amb limited to approx 125 due to fatigue.   Cueing to advance L LE  Sit to stand with min assist, cueing to shift weight forward   Post Treatment Pain Level (on 0 to 10) scale:   0  / 10 ASSESSMENT    Assessment/Changes in Function:     Fatique today, limiting activity     []  See Progress Note/Recertification   Patient will continue to benefit from skilled PT services to modify and progress therapeutic interventions, address functional mobility deficits, address ROM deficits, address strength deficits, analyze and cue movement patterns, address imbalance/dizziness, and instruct in home and community integration to attain remaining goals.       Progress toward goals / Updated goals:    Limited progress due to fatigue/ frustration       []  See Progress Note/Recertification    PLAN    [x]  Upgrade activities as tolerated {YES) Continue plan of care   []  Discharge due to :    []  Other:      Therapist: Lauren Gamino PT    Date: 8/3/2022 Time: 11:48 AM     Future Appointments   Date Time Provider Ivette Funez   8/5/2022  9:45 AM Olga Rojas PTA [facility]   8/8/2022 12:00 PM Olga Rojas PTA [facility]   8/10/2022  1:15 PM Olga Rojas PTA [facility]   8/12/2022  9:45 AM Olga Rojas PTA [facility]   11/3/2022 11:00 AM Lobo Stauffer MD CAP BS AMB   12/2/2022  1:30 PM Dejuan Kaur MD \[facility]\"" BS AMB

## 2022-08-05 ENCOUNTER — APPOINTMENT (OUTPATIENT)
Dept: PHYSICAL THERAPY | Age: 67
End: 2022-08-05
Payer: MEDICARE

## 2022-08-05 ENCOUNTER — HOSPITAL ENCOUNTER (OUTPATIENT)
Dept: PHYSICAL THERAPY | Age: 67
Discharge: HOME | End: 2022-08-05
Payer: MEDICARE

## 2022-08-05 PROCEDURE — 97110 THERAPEUTIC EXERCISES: CPT

## 2022-08-05 PROCEDURE — 97116 GAIT TRAINING THERAPY: CPT

## 2022-08-05 PROCEDURE — 97112 NEUROMUSCULAR REEDUCATION: CPT

## 2022-08-05 NOTE — PROGRESS NOTES
PHYSICAL THERAPY - DAILY TREATMENT NOTE     Patient Name: Evette Kumar        Date: 2022  : 1955   YES Patient  Verified  Visit #:   15   of   20  Insurance: Payor: Suellen Ip / Plan: [address] / Product Type: Managed Care Medicare /      In time: 9:46 am Out time: 10:39   Total Treatment Time: 53     Medicare/BCBS Time Tracking (below)   Total Timed Codes (min):  43 1:1 Treatment Time:  43     TREATMENT AREA =  Right leg weakness [R29.898]    SUBJECTIVE    Pain Level (on 0 to 10 scale):  0  / 10   Medication Changes/New allergies or changes in medical history, any new surgeries or procedures? NO    If yes, update Summary List   Subjective Functional Status/Changes:  []  No changes reported     Pt reports LBP with lying .  Doing about the same         OBJECTIVE      Modalities Rationale:     decrease pain and increase tissue extensibility to improve patient's ability to perform ADLs    min [] Estim, type/location:                                      []  att     []  unatt     []  w/US     []  w/ice    []  w/heat    min []  Mechanical Traction: type/lbs                   []  pro   []  sup   []  int   []  cont    []  before manual    []  after manual    min []  Ultrasound, settings/location:      min []  Iontophoresis w/ dexamethasone, location:                                               []  take home patch       []  in clinic   10 min []  Ice     [x]  Heat    location/position: Sitting MH LB-pre ex    min []  Vasopneumatic Device, press/temp:    If using vaso (only need to measure limb vaso being performed on)      pre-treatment girth :       post-treatment girth :       measured at (landmark location) :      min []  Other:    [x] Skin assessment post-treatment (if applicable):    [x]  intact    []  redness- no adverse reaction                  []redness - adverse reaction:      21 min Therapeutic Exercise:  [x]  See flow sheet   Rationale: increase ROM, increase strength, improve coordination, improve balance, and increase proprioception to improve the patients ability to  improve patient's ability to perform household and community ambulation         12 min Neuromuscular Re-ed: [x]  See flow sheet   Rationale:      increase ROM, increase strength, improve coordination, improve balance, and increase proprioception to improve the patients ability to  improve patient's ability to perform household and community ambulation     10 min Gait : Technique:      Pt amb ~ 100 ft x2, 25 ft x1 ; transfer training with RW -chair with Bk-CGA with slightly elevated surfaces; moderate VCs for LLE advancement in gait and to stay within RW frame for safety   Rationale:       safety in ambulation and   to improve patient's ability to perform household and community ambulation        Billed With/As:   [] TE   [] TA   [] Neuro   [] Self Care Patient Education: [x] Review HEP    [] Progressed/Changed HEP based on:   [] positioning   [] body mechanics   [] transfers   [] heat/ice application    [] other:        Other Objective/Functional Measures: Add static standing balance     Post Treatment Pain Level (on 0 to 10) scale:   0  / 10     ASSESSMENT    Assessment/Changes in Function:     Sit to stand training from standard chair and standard chair with foam -pt progressing form min A to CG assist with education for proper form  Extensive VCs throughout RX for safety and form. []  See Progress Note/Recertification   Patient will continue to benefit from skilled PT services to modify and progress therapeutic interventions, address functional mobility deficits, address ROM deficits, address strength deficits, analyze and modify body mechanics/ergonomics, address imbalance/dizziness, and instruct in home and community integration to attain remaining goals.       Progress toward goals / Updated goals:    Fair Progress to    [] STG    [x] LTG  1 -2 as shown by improving use of hip and ankle balance strategy EO       PLAN    [x]  Upgrade activities as tolerated YES Continue plan of care   []  Discharge due to :    []  Other:      Therapist: Nara Nguyen PTA    Date: 8/5/2022 Time: 9:46 AM     Future Appointments   Date Time Provider Ivette Funez   8/8/2022 12:00 PM Dumont Speed, PTA [facility]   8/10/2022  1:15 PM Dumont Speed, PTA [facility]   8/12/2022  9:45 AM Dumont Speed, PTA [facility]   11/3/2022 11:00 AM Sylvia Montero MD CAP BS AMB   12/2/2022  1:30 PM Virgen Escalante MD FP BS AMB

## 2022-08-08 ENCOUNTER — HOSPITAL ENCOUNTER (OUTPATIENT)
Dept: PHYSICAL THERAPY | Age: 67
Discharge: HOME | End: 2022-08-08
Payer: MEDICARE

## 2022-08-08 PROCEDURE — 97110 THERAPEUTIC EXERCISES: CPT

## 2022-08-08 PROCEDURE — 97116 GAIT TRAINING THERAPY: CPT

## 2022-08-08 PROCEDURE — 97112 NEUROMUSCULAR REEDUCATION: CPT

## 2022-08-08 NOTE — PROGRESS NOTES
PHYSICAL THERAPY - DAILY TREATMENT NOTE     Patient Name: Ruiz Walker        Date: 2022  : 1955   YES Patient  Verified  Visit #:     Insurance: Payor: Julius Muse / Plan: [address] / Product Type: Managed Care Medicare /      In time: 11:50  Out time: 12:48   Total Treatment Time: 58     Medicare/BCBS Time Tracking (below)   Total Timed Codes (min):  58 1:1 Treatment Time:  55     TREATMENT AREA =  Right leg weakness [R29.898]    SUBJECTIVE    Pain Level (on 0 to 10 scale):  0  / 10   Medication Changes/New allergies or changes in medical history, any new surgeries or procedures? NO    If yes, update Summary List   Subjective Functional Status/Changes:  []  No changes reported     Pt reports \"stiff\" today         OBJECTIVE  Modalities Rationale:  n/a       32 min Therapeutic Exercise:  [x]  See flow sheet   Rationale:      increase ROM, increase strength, improve coordination, improve balance, and increase proprioception to improve the patients ability to perform functional ADLs          18 min Neuromuscular Re-ed: [x]  See flow sheet; NMR for sitting weight shift , hip hinge   Rationale:      increase ROM, increase strength, improve coordination, improve balance, and increase proprioception to improve the patients ability to perform functional ADLs       8 min Gait Training:  _50-75__ feet  x2 with _RW__ device on level surfaces with _CG/SBA__ level of assistance   Rationale: To improve ambulation safety and efficiency in order to improve patient's ability to safely ambulate at home for self care.       Billed With/As:   [] TE   [] TA   [] Neuro   [] Self Care Patient Education: [x] Review HEP    [] Progressed/Changed HEP based on:   [] positioning   [] body mechanics   [] transfers   [] heat/ice application    [] other:        Other Objective/Functional Measures:    Resume side stepping 3x in // bar     Post Treatment Pain Level (on 0 to 10) scale:   0  / 10     ASSESSMENT    Assessment/Changes in Function:     Pt demonstrating slight decrease in fatigue with static standing , improving doug and step lenth     []  See Progress Note/Recertification   Patient will continue to benefit from skilled PT services to modify and progress therapeutic interventions, address functional mobility deficits, address ROM deficits, address strength deficits, analyze and address soft tissue restrictions, analyze and cue movement patterns, and instruct in home and community integration to attain remaining goals.       Progress toward goals / Updated goals:    Fair Progress to    [x] STG    [] LTG  2 as shown by improving static standing balance with decreasing support, extensive VCs throughout session for safety& sequencing       PLAN    [x]  Upgrade activities as tolerated YES Continue plan of care   []  Discharge due to :    []  Other:      Therapist: Lisandro Cole PTA    Date: 8/8/2022 Time: 12:48 PM     Future Appointments   Date Time Provider Ivette Funez   8/10/2022  1:15 PM Alvarado Jacobson PTA [facility]   8/12/2022  1:15 PM Alvarado Jacobson PTA [facility]   8/15/2022  9:45 AM Alvarado Jacobson, PTA [facility]   8/17/2022 10:30 AM Jose Faye, PT [facility]   8/19/2022 10:30 AM Isauro Escobar, PT [facility]   8/22/2022 11:15 AM Alvarado Jacobson, PTA [facility]   8/24/2022 11:15 AM Alvarado Jacobson, PTA [facility]   8/26/2022 11:15 AM Isauro Escobar, PT [facility]   8/29/2022 11:00 AM Isauro Escobar, PT [facility]   8/31/2022 11:15 AM Alvarado Jacobson PTA [facility]   9/2/2022 11:15 AM Alvarado Jacobson PTA [facility]   11/3/2022 11:00 AM Eric Kim MD [facility] BS AMB   12/2/2022  1:30 PM Nicola Shea MD [facility] BS AMB

## 2022-08-10 ENCOUNTER — HOSPITAL ENCOUNTER (OUTPATIENT)
Dept: PHYSICAL THERAPY | Age: 67
Discharge: HOME | End: 2022-08-10
Payer: MEDICARE

## 2022-08-10 PROCEDURE — 97110 THERAPEUTIC EXERCISES: CPT

## 2022-08-10 PROCEDURE — 97530 THERAPEUTIC ACTIVITIES: CPT

## 2022-08-10 PROCEDURE — 97112 NEUROMUSCULAR REEDUCATION: CPT

## 2022-08-10 NOTE — PROGRESS NOTES
PHYSICAL THERAPY - DAILY TREATMENT NOTE     Patient Name: Ruiz Walker        Date: 8/10/2022  : 1955   YES Patient  Verified  Visit #:     Insurance: Payor: Julius Muse / Plan: [address] / Product Type: Managed Care Medicare /      In time: 1:15 PM Out time: 2:15 PM   Total Treatment Time: 60 min. Medicare/[facility] Time Tracking (below)   Total Timed Codes (min):  60 1:1 Treatment Time:  60 min. TREATMENT AREA =  Right leg weakness [R29.898]    SUBJECTIVE    Pain Level (on 0 to 10 scale):  0 / 10   Medication Changes/New allergies or changes in medical history, any new surgeries or procedures? NO    If yes, update Summary List   Subjective Functional Status/Changes:  []  No changes reported     No c/o pain. No falls or near falls. OBJECTIVE  Modalities Rationale:     decrease inflammation, decrease pain, and increase tissue extensibility to improve patient's ability to perform functional ADLs.    min [] Estim, type/location:                                      []  att     []  unatt     []  w/US     []  w/ice    []  w/heat    min []  Mechanical Traction: type/lbs                   []  pro   []  sup   []  int   []  cont    []  before manual    []  after manual    min []  Ultrasound, settings/location:      min []  Iontophoresis w/ dexamethasone, location:                                               []  take home patch       []  in clinic    min []  Ice     []  Heat    location/position:     min []  Vasopneumatic Device, press/temp:    If using vaso (only need to measure limb vaso being performed on)      pre-treatment girth :       post-treatment girth :       measured at (landmark location) :      min []  Other:    [] Skin assessment post-treatment (if applicable):    []  intact    []  redness- no adverse reaction                  []redness - adverse reaction:      15 min Therapeutic Exercise:  [x]  See flow sheet   Rationale: increase ROM, increase strength, and increase proprioception to improve the patients ability to perform functional ADLs     22 min Therapeutic Activity: [x]  See flow sheet   Rationale:      increase ROM, increase strength, improve coordination, improve balance, and increase proprioception to improve the patients ability to perform functional ADLs     20 min Neuromuscular Re-ed: [x]  See flow sheet   Rationale:      increase strength, improve coordination, improve balance, and increase proprioception to improve the patients ability to perform functional ADLs     0 min Manual Therapy: Technique:      [] S/DTM []IASTM []PROM [] Passive Stretching   []manual TPR    []Jt manipulation:Gr I [] II []  III [] IV[] V[]  Treatment Area:     Rationale:      increase ROM, increase tissue extensibility, decrease trigger points, and increase postural awareness to improve patient's ability to perform functional ADLs  The manual therapy interventions were performed at a separate and distinct time from the therapeutic activities interventions. 3 min Gait Training:  _50' x 2__ feet with _RW_ device on level surfaces with _SB__ level of assistance and verbal cuing for increased step heights and heel strike at initial contact to avoid shuffling/scuffing feet. Rationale: To improve ambulation safety and efficiency in order to improve patient's ability to safely ambulate at home for self care. 0 min Self Care:    Rationale:    increase ROM, increase strength, improve coordination, improve balance, and increase proprioception to improve the patients ability to perform functional ADLs    Billed With/As:   [] TE   [] TA   [] Neuro   [] Self Care Patient Education: [x] Review HEP    [] Progressed/Changed HEP based on:   [] positioning   [] body mechanics   [] transfers   [] heat/ice application    [] other:        Other Objective/Functional Measures:    Weak ankle DF and PF bilat (toe raises and heel raises).  Less steady/weak knee control on R LE. Post Treatment Pain Level (on 0 to 10 scale):   0 / 10     ASSESSMENT    Assessment/Changes in Function:     Much improved sit <==> stand after working on forward weightshifting, but difficulty with scooting forward to edge of mat table when needed. []  See Progress Note/Recertification   Patient will continue to benefit from skilled PT services to modify and progress therapeutic interventions, address functional mobility deficits, address ROM deficits, address strength deficits, analyze and address soft tissue restrictions, analyze and cue movement patterns, analyze and modify body mechanics/ergonomics, assess and modify postural abnormalities, and instruct in home and community integration to attain remaining goals. Progress toward goals / Updated goals:    Good Progress to    [] STG    [x] LTG  4 as shown by observed performance of activity during the PT session. PLAN    [x]  Upgrade activities as tolerated YES Continue plan of care   []  Discharge due to :      [x]  Other:   Maybe try higher walker.      Therapist: aMylin Morrissey PT    Date: 8/10/2022 Time: 12:58 PM     Future Appointments   Date Time Provider Ivette Funez   8/10/2022  1:15 PM Yeni Patient, PT [facility]   8/12/2022  1:15 PM Samy Mcintosh PTA [facility]   8/15/2022  9:45 AM Samy Mcintosh PTA [facility]   8/17/2022 10:30 AM Yeni Patient, PT [facility]   8/19/2022 10:30 AM Basim Guzman PT [facility]   8/22/2022 11:15 AM Samy Mcintosh PTA [facility]   8/24/2022 11:15 AM Samy Mcintosh PTA [facility]   8/26/2022 11:15 AM Basim Guzman, PT [facility]   8/29/2022 11:00 AM Basim Guzman PT [facility]   8/31/2022 11:15 AM Samy Mcintosh PTA [facility]   9/2/2022 11:15 AM Samy Mcintosh PTA [facility]   11/3/2022 11:00 AM Roberto Jj MD [facility] BS AMB   12/2/2022  1:30 PM Derick Gee MD [facility] BS AMB

## 2022-08-12 ENCOUNTER — HOSPITAL ENCOUNTER (OUTPATIENT)
Dept: PHYSICAL THERAPY | Age: 67
Discharge: HOME | End: 2022-08-12
Payer: MEDICARE

## 2022-08-12 PROCEDURE — 97112 NEUROMUSCULAR REEDUCATION: CPT

## 2022-08-12 PROCEDURE — 97110 THERAPEUTIC EXERCISES: CPT

## 2022-08-12 PROCEDURE — 97530 THERAPEUTIC ACTIVITIES: CPT

## 2022-08-15 ENCOUNTER — HOSPITAL ENCOUNTER (OUTPATIENT)
Dept: PHYSICAL THERAPY | Age: 67
Discharge: HOME | End: 2022-08-15
Payer: MEDICARE

## 2022-08-15 PROCEDURE — 97112 NEUROMUSCULAR REEDUCATION: CPT

## 2022-08-15 PROCEDURE — 97116 GAIT TRAINING THERAPY: CPT

## 2022-08-15 PROCEDURE — 97110 THERAPEUTIC EXERCISES: CPT

## 2022-08-15 NOTE — PROGRESS NOTES
PHYSICAL THERAPY - DAILY TREATMENT NOTE     Patient Name: Neha Orozco        Date: 8/15/2022  : 1955   YES Patient  Verified  Visit #:     Insurance: Payor: Jorge Ward / Plan: [address] / Product Type: Managed Care Medicare /      In time: 10:01 am Out time: 10:45    Total Treatment Time: 44     Medicare/BCBS Time Tracking (below)   Total Timed Codes (min):  44 1:1 Treatment Time:  42     TREATMENT AREA =  Right leg weakness [R29.898]    SUBJECTIVE    Pain Level (on 0 to 10 scale):  0  / 10   Medication Changes/New allergies or changes in medical history, any new surgeries or procedures? NO    If yes, update Summary List   Subjective Functional Status/Changes:  []  No changes reported   Pt reporting doing ok today and ready to participate in PT  \"I want to try and walk with a cane. \"          OBJECTIVE  Modalities Rationale:  n/a       17 min Therapeutic Exercise:  [x]  See flow sheet   Rationale:      increase ROM, increase strength, improve coordination, improve balance, and increase proprioception to improve the patients ability to perform self care         16 min Neuromuscular Re-ed: [x]  VCs and tactile cues for proper hip shift in stepping strategy in // bars; static standing EO , EC NMR sit to stand body mechanics   Rationale:      increase ROM, increase strength, improve coordination, improve balance, and increase proprioception to improve the patients ability to perform functional ADLs           11 min Gait Trainin___ feet x 3; with _RW__ device on level surfaces with __SBA/ CGA_ level of assistance; transfer training from mat to RW, chair with 4 inch foam raised seat   Rationale: To improve ambulation safety and efficiency in order to improve patient's ability to safely ambulate at home for self care.     Billed With/As:   [] TE   [] TA   [] Neuro   [] Self Care Patient Education: [x] Review HEP    [] Progressed/Changed HEP based on:   [] positioning   [] body mechanics   [] transfers   [] heat/ice application    [] other:        Other Objective/Functional Measures:    Advanced EO MSR BUN   Recommended pt to walk to kitchen for meals. Post Treatment Pain Level (on 0 to 10) scale:   0  / 10     ASSESSMENT    Assessment/Changes in Function:     Moderate VCs throughout session for attention safety in gait and transfers, sequencing for exericses. Pt demonstrating improving step length, decreasing UE support through UE's during amb with RW, safety in sit to stand and transfers after education     []  See Progress Note/Recertification   Patient will continue to benefit from skilled PT services to modify and progress therapeutic interventions, address functional mobility deficits, address ROM deficits, address strength deficits, analyze and address soft tissue restrictions, analyze and cue movement patterns, analyze and modify body mechanics/ergonomics, assess and modify postural abnormalities, address imbalance/dizziness, and instruct in home and community integration to attain remaining goals.       Progress toward goals / Updated goals:    Good Progress to    [x] STG    [] LTG  1-3 as shown by improving static standing balance, safety in gait and transfers       PLAN    [x]  Upgrade activities as tolerated YES Continue plan of care   []  Discharge due to :    []  Other:      Therapist: Lisandro Cole PTA    Date: 8/15/2022 Time: 10:45  AM     Future Appointments   Date Time Provider Ivette Funez   8/17/2022 10:30 AM Jose Faye, PT [facility]   8/19/2022 10:30 AM Isauro Escobar, PT [facility]   8/22/2022 11:15 AM Alvarado Jacobson PTA [facility]   8/24/2022 11:15 AM Alvarado Jacobson PTA [facility]   8/26/2022 11:15 AM Isauro Escobar, PT [facility]   8/29/2022 11:00 AM Isauro Escobar, PT [facility]   8/31/2022 11:15 AM Alvaradochiara Jacobson PTA [facility]   9/2/2022 11:15 AM Alvarado Jacobson PTA [facility]   11/3/2022 11:00 AM Roderick Vaughan MD [facility] BS AMB   12/2/2022  1:30 PM Usha Galvez MD [facility] BS AMB

## 2022-08-17 ENCOUNTER — HOSPITAL ENCOUNTER (OUTPATIENT)
Dept: PHYSICAL THERAPY | Age: 67
Discharge: HOME | End: 2022-08-17
Payer: MEDICARE

## 2022-08-17 PROCEDURE — 97110 THERAPEUTIC EXERCISES: CPT

## 2022-08-17 PROCEDURE — 97112 NEUROMUSCULAR REEDUCATION: CPT

## 2022-08-17 PROCEDURE — 97530 THERAPEUTIC ACTIVITIES: CPT

## 2022-08-17 NOTE — PROGRESS NOTES
PHYSICAL THERAPY - DAILY TREATMENT NOTE      Patient Name:  Ruiz Walker                          Date:  2022  :  1955                                  YES Patient  Verified  Visit #:                   Insurance:  Payor:  [employer]cy [employer] / Plan: [address] / Product Type: Managed Care Medicare /       In time: 10:35 AM Out time: 11:25 AM   Total Treatment Time: 50 min. Medicare/[facility] Time Tracking (below)   Total Timed Codes (min):  50 1:1 Treatment Time:  50 min. TREATMENT AREA =  Right leg weakness [R29.898]     SUBJECTIVE     Pain Level (on 0 to 10 scale):  0 / 10   Medication Changes/New allergies or changes in medical history, any new surgeries or procedures? NO    If yes, update Summary List   Subjective Functional Status/Changes:  []  No changes reported      No c/o pain. No falls or near falls. Pt does report R knee with some swelling today. OBJECTIVE  Modalities Rationale:     decrease inflammation, decrease pain, and increase tissue extensibility to improve patient's ability to perform functional ADLs.                 min [] Estim, type/location:                                                            []  att     []  unatt     []  w/US     []  w/ice    []  w/heat     min []  Mechanical Traction: type/lbs                    []  pro   []  sup   []  int   []  cont    []  before manual    []  after manual     min []  Ultrasound, settings/location:        min []  Iontophoresis w/ dexamethasone, location:                                                []  take home patch       []  in clinic     min []  Ice     []  Heat    location/position:       min []  Vasopneumatic Device, press/temp:     If using vaso (only need to measure limb vaso being performed on)      pre-treatment girth :       post-treatment girth :       measured at (landmark location) :       min []  Other:     [] Skin assessment post-treatment (if applicable):    []  intact    []  redness- no adverse reaction                  []redness - adverse reaction:        10 min Therapeutic Exercise:  [x]  See flow sheet   Rationale:      increase ROM, increase strength, and increase proprioception to improve the patients ability to perform functional ADLs      25 min Therapeutic Activity: [x]  See flow sheet   Rationale:      increase ROM, increase strength, improve coordination, improve balance, and increase proprioception to improve the patients ability to perform functional ADLs      15 min Neuromuscular Re-ed: [x]  See flow sheet   Rationale:      increase strength, improve coordination, improve balance, and increase proprioception to improve the patients ability to perform functional ADLs      0 min Manual Therapy:   Technique:      [] S/DTM []IASTM []PROM [] Passive Stretching   []manual TPR    []Jt manipulation:Gr I [] II []  III [] IV[] V[]  Treatment Area:     Rationale:      increase ROM, increase tissue extensibility, decrease trigger points, and increase postural awareness to improve patient's ability to perform functional ADLs  The manual therapy interventions were performed at a separate and distinct time from the therapeutic activities interventions. 0 min Gait Training:  ___ feet with __ device on level surfaces with ___ level of assistance and verbal cuing for increased step heights and heel strike at initial contact to avoid shuffling/scuffing feet. Rationale: To improve ambulation safety and efficiency in order to improve patient's ability to safely ambulate at home for self care.      0 min Self Care:     Rationale:    increase ROM, increase strength, improve coordination, improve balance, and increase proprioception to improve the patients ability to perform functional ADLs     Billed With/As:   [] TE   [] TA   [] Neuro   [] Self Care Patient Education: [x] Review HEP    [] Progressed/Changed HEP based on:   [] positioning   [] body mechanics   [] transfers   [] heat/ice application    [] other:              Other Objective/Functional Measures:     Weak ankle DF and PF bilat (toe raises and heel raises). Less steady/weak knee control on R LE. Post Treatment Pain Level (on 0 to 10 scale):   0 / 10      ASSESSMENT     Assessment/Changes in Function:      Still needs cuing and/or min assist with sit <==> stand due to decreased forward weightshift; difficulty with scooting forwared to seat edge. Forward drift with sidestepping,  Cuing to turn more with feet before reaching for bars when turning to L and to R. Decreased SLS time in gait in bars with single UE support, especially on R LE.      []  See Progress Note/Recertification   Patient will continue to benefit from skilled PT services to modify and progress therapeutic interventions, address functional mobility deficits, address ROM deficits, address strength deficits, analyze and address soft tissue restrictions, analyze and cue movement patterns, analyze and modify body mechanics/ergonomics, assess and modify postural abnormalities, and instruct in home and community integration to attain remaining goals. Progress toward goals / Updated goals:     Good Progress to    [] STG    [x] LTG  4 as shown by observed performance of activity during the PT session. PLAN           [x]  Upgrade activities as tolerated YES Continue plan of care   []  Discharge due to:       [x]  Other:   REASSESS.             Therapist: MORALES Devi     Date:  08/17/2022 Time:  8:04 AM

## 2022-08-19 ENCOUNTER — APPOINTMENT (OUTPATIENT)
Dept: PHYSICAL THERAPY | Age: 67
End: 2022-08-19
Payer: MEDICARE

## 2022-08-19 ENCOUNTER — HOSPITAL ENCOUNTER (OUTPATIENT)
Dept: PHYSICAL THERAPY | Age: 67
Discharge: HOME | End: 2022-08-19
Payer: MEDICARE

## 2022-08-19 PROCEDURE — 97112 NEUROMUSCULAR REEDUCATION: CPT

## 2022-08-19 PROCEDURE — 97116 GAIT TRAINING THERAPY: CPT

## 2022-08-19 PROCEDURE — 97110 THERAPEUTIC EXERCISES: CPT

## 2022-08-19 NOTE — PROGRESS NOTES
PHYSICAL THERAPY - DAILY TREATMENT NOTE     Patient Name: Carina Mcbride        Date: 2022  : 1955   YES Patient  Verified  Visit #:     Insurance: Payor: [employer] MEDICARE / Plan: [address] / Product Type: Managed Care Medicare /      In time: 8544 Out time: 1120   Total Treatment Time: 50     Medicare/BCBS Time Tracking (below)   Total Timed Codes (min):  50 1:1 Treatment Time:  50     TREATMENT AREA =  Right leg weakness [R29.898]    SUBJECTIVE    Pain Level (on 0 to 10 scale):  0  / 10   Medication Changes/New allergies or changes in medical history, any new surgeries or procedures? NO    If yes, update Summary List   Subjective Functional Status/Changes:  []  No changes reported     See PN         OBJECTIVE    20 min Therapeutic Exercise:  [x]  See flow sheet   Rationale:      increase ROM and increase strength to improve the patients ability to perform ADLs     20 min Neuromuscular Re-ed: [x]  See flow sheet   Rationale:      improve coordination, improve balance, and increase proprioception to improve the patients ability to perform ADLs     10 min Gait Training:  _200__ feet with _RW__ device on level surfaces with __S_ level of assistance   Rationale: To improve ambulation safety and efficiency in order to improve patient's ability to safely ambulate at home for self care.         Billed With/As:   [] TE   [] TA   [] Neuro   [] Self Care Patient Education: [x] Review HEP    [] Progressed/Changed HEP based on:   [] positioning   [] body mechanics   [] transfers   [] heat/ice application    [] other:        Other Objective/Functional Measures:    See re-ceet   Post Treatment Pain Level (on 0 to 10) scale:   0  / 10     ASSESSMENT      [x]  See Progress Note/Recertification    PLAN    [x]  Upgrade activities as tolerated {YES) Continue plan of care   []  Discharge due to :    []  Other:      Therapist: Michelle Rosenbaum, PT    Date: 8/19/2022 Time: 12:13 PM     Future Appointments   Date Time Provider Ivette Funez   8/22/2022 11:15 AM Amaryllis Sale, PTA [facility]   8/24/2022 11:15 AM Amaryllis Sale, PTA [facility]   8/26/2022 11:15 AM Cornell Waller, PT [facility]   8/29/2022 11:00 AM Cornell Waller, PT [facility]   8/31/2022 11:15 AM Amaryllis Sale, PTA [facility]   9/2/2022 11:15 AM Amaryllis Sale, PTA [facility]   9/6/2022 10:30 AM Cornell Waller, PT [facility]   9/7/2022 10:30 AM Amaryllis Sale, PTA [facility]   9/9/2022 11:15 AM Cornell Waller, PT [facility]   9/12/2022 11:00 AM Cornell Waller, PT [facility]   9/14/2022 10:30 AM Amaryllis Sale, PTA [facility]   9/16/2022 10:30 AM Cornell Waller, PT [facility]   11/3/2022 11:00 AM Katelin Ramos MD [facility] BS AMB   12/2/2022  1:30 PM Karthikeyan Tony MD [facility] BS AMB

## 2022-08-19 NOTE — PROGRESS NOTES
[address] [facility] PHYSICAL THERAPY AT [facility] 93. 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 Independent with ADLs/ iADLs prior   Comorbidities: Aneurysm 2021 with residual L hemiparesis, arthritis, LBP, HTN, visual impairment   Medications: Verified on Patient Summary List   Visits from [facility]: 20 Missed Visits: -       Previous Goals:  1. Patient will report at least 50% reduction of symptoms with ADLs. 2. Patient will be independent with self progression of HEP and demonstrate willingness to continue HEP after D/C to maximize/maintain gains in functional mobility. 3. Tinetti's will improve to greater than or equal to 16/28 points to demonstrate significant reduction of imbalance with ADLs. 4. Patient to be independent with all transfers  5.  Patient to ambulate independently >= 300 feet with least restrictive assistive device                    Prior Level/Current Level:  1) Prior Level: 40%                 Current Level: 70%                 Goal Met? progressing  2) Prior Level: indep with current HEP                 Current Level: indep with current HEP                 Goal Met? yes  3) Prior Level:                  Current Level:                  Goal Met? progressing  3) Prior Level: min assist with sit to stand; from raised surface; reported mod assist with sit to supine                 Current Level: supervision to transfer sit to stand from raised surface(not fully consistent); mod assist for bed transfers                 Goal Met? progressing  3) Prior Level: 100 feet with RW and supervision                 Current Level: 200 feet with RW and supervision                 Goal Met? progressing     Key Functional Changes/Progress: Patient reports improved ease with getting up from chairs and walking. He continues to require assist of his wife to get out of bed, primarily due to his sciatic pain. Balance is improving and he reports no recent falls or near falls. He is now able to walk 200 feet continuously with RW and supervision, but fatigues easily. He has difficulty advancing L LE on occasional / frequent basis. Problem List: pain affecting function, decrease ROM, decrease strength, edema affecting function, impaired gait/ balance, decrease ADL/ functional abilitiies, decrease activity tolerance, and decrease transfer abilities                 Treatment Plan may include any combination of the following: Therapeutic exercise, Therapeutic activities, Neuromuscular re-education, Gait/balance training, Patient education, Self Care training, and Home safety training  Patient Goal(s) has been updated and includes:      Goals for this certification period include and are to be achieved in   4-5  weeks:     Frequency / Duration:       Patient to be seen   2-3   times per week for   4    weeks:  1. Patient will report at least 50% reduction of symptoms with ADLs. 2. Patient will be independent with self progression of HEP and demonstrate willingness to continue HEP after D/C to maximize/maintain gains in functional mobility. 3. Tinetti's will improve to greater than or equal to 17/28 points to demonstrate significant reduction of imbalance with ADLs. 4. Patient to be independent with all transfers  5.  Patient to ambulate independently >= 300 feet with least restrictive assistive device  Assessments/Recommendations: continue PT per current POC  If you have any questions/comments please contact us directly at [phone]. Thank you for allowing us to assist in the care of your patient. Therapist Signature: Facundo Mai PT Date: 9/02/5473   Certification Period:  Reporting Period: 8/17/22 to 11/14/22 7/25/22 to 8/17/22 Time: 1:23 PM   NOTE TO PHYSICIAN:  PLEASE COMPLETE THE ORDERS BELOW AND FAX TO   [facility] at [address]: [phone]. If you are unable to process this request in 24 hours please contact our office: [phone].     ___ I have read the above report and request that my patient continue as recommended.   ___ I have read the above report and request that my patient continue therapy with the following changes/special instructions: ________________________________________________   ___ I have read the above report and request that my patient be discharged from therapy.       Physician Signature:                                                                       Date:                                     Time:                  Mark Meek MD.

## 2022-08-22 ENCOUNTER — HOSPITAL ENCOUNTER (OUTPATIENT)
Dept: PHYSICAL THERAPY | Age: 67
Discharge: HOME | End: 2022-08-22
Payer: MEDICARE

## 2022-08-22 PROCEDURE — 97112 NEUROMUSCULAR REEDUCATION: CPT

## 2022-08-22 PROCEDURE — 97110 THERAPEUTIC EXERCISES: CPT

## 2022-08-22 PROCEDURE — 97530 THERAPEUTIC ACTIVITIES: CPT

## 2022-08-22 NOTE — PROGRESS NOTES
PHYSICAL THERAPY - DAILY TREATMENT NOTE     Patient Name: Antonio Barth        Date: 2022  : 1955   YES Patient  Verified  Visit #:     Insurance: Payor: [employer] MEDICARE / Plan: [address] / Product Type: Managed Care Medicare /      In time: 11:23 Out time: 12:03 pm   Total Treatment Time: 40     Medicare/BCBS Time Tracking (below)   Total Timed Codes (min):  40 1:1 Treatment Time:  40     TREATMENT AREA =  Right leg weakness [R29.898]    SUBJECTIVE    Pain Level (on 0 to 10 scale):  0  / 10   Medication Changes/New allergies or changes in medical history, any new surgeries or procedures? NO    If yes, update Summary List   Subjective Functional Status/Changes:  []  No changes reported     Pt reports right knee is sore after bumping it on a wall @ [employer] yesterday         OBJECTIVE  Modalities Rationale:  n/a       14 min Therapeutic Exercise:  [x]  See flow sheet   Rationale:      increase ROM, increase strength, and improve coordination to improve the patients ability to perform self care     9 min Therapeutic Activity: [x]  See flow sheet   Rationale:      increase ROM, increase strength, improve coordination, improve balance, and increase proprioception to improve the patients ability to perform self care     17 min Neuromuscular Re-ed: [x]  See flow sheet   Rationale:      improve coordination, improve balance, and increase proprioception to improve the patients ability to perform self care         Billed With/As:   [] TE   [] TA   [] Neuro   [] Self Care Patient Education: [x] Review HEP    [] Progressed/Changed HEP based on:   [] positioning   [] body mechanics   [] transfers   [] heat/ice application    [] other:        Other Objective/Functional Measures:     Add sit alt U/LE , resume 360 CW/CCW turn 1x each     Post Treatment Pain Level (on 0 to 10) scale:   0  / 10     ASSESSMENT    Assessment/Changes in Function:   VCs for safety and sequencing throughout session. Pt demonstrating improving safety and stepping strategy during transfers with RW     []  See Progress Note/Recertification   Patient will continue to benefit from skilled PT services to modify and progress therapeutic interventions, address functional mobility deficits, address ROM deficits, address strength deficits, analyze and address soft tissue restrictions, analyze and cue movement patterns, assess and modify postural abnormalities, address imbalance/dizziness, and instruct in home and community integration to attain remaining goals.       Progress toward goals / Updated goals:    Good Progress to    [x] STG    [] LTG  1 -2 as shown by improving stepping strategy in transfers with L LE       PLAN    [x]  Upgrade activities as tolerated YES Continue plan of care   []  Discharge due to :    []  Other:      Therapist: Radha Man PTA    Date: 8/22/2022 Time: 12:03 pm     Future Appointments   Date Time Provider Ivette Funez   8/24/2022 11:15 AM Denia Neves PTA [facility]   8/26/2022 11:15 AM Bel Oseguera, PT [facility]   8/29/2022 11:00 AM Bel Oseguera, PT [facility]   8/31/2022 11:15 AM Denia Neves PTA [facility]   9/2/2022 11:15 AM Denia Neves PTA [facility]   9/6/2022 10:30 AM Bel Oseguera, PT [facility]   9/7/2022 10:30 AM Denia Neves, PTA [facility]   9/9/2022 11:15 AM Bel Oseguera, PT [facility]   9/12/2022 11:00 AM Bel Oseguera, PT [facility]   9/14/2022 10:30 AM Denia Neves, PTA [facility]   9/16/2022 10:30 AM Bel Oseguera PT [facility]   11/3/2022 11:00 AM Iliana Xie MD [facility] BS AMB   12/2/2022  1:30 PM Fatoumata Kwon MD [facility] BS AMB

## 2022-08-24 ENCOUNTER — HOSPITAL ENCOUNTER (OUTPATIENT)
Dept: PHYSICAL THERAPY | Age: 67
Discharge: HOME | End: 2022-08-24
Payer: MEDICARE

## 2022-08-24 PROCEDURE — 97112 NEUROMUSCULAR REEDUCATION: CPT

## 2022-08-24 PROCEDURE — 97530 THERAPEUTIC ACTIVITIES: CPT

## 2022-08-24 PROCEDURE — 97110 THERAPEUTIC EXERCISES: CPT

## 2022-08-24 NOTE — PROGRESS NOTES
PHYSICAL THERAPY - DAILY TREATMENT NOTE     Patient Name: Raj Ya        Date: 2022  : 1955   YES Patient  Verified  Visit #:     Insurance: Payor: [employer] MEDICARE / Plan: [address] / Product Type: Managed Care Medicare /      In time: 11:12 am Out time: 11:55   Total Treatment Time: 43     Medicare/BCBS Time Tracking (below)   Total Timed Codes (min):  43 1:1 Treatment Time:  43     TREATMENT AREA =  Right leg weakness [R29.898]    SUBJECTIVE    Pain Level (on 0 to 10 scale):  0  / 10   Medication Changes/New allergies or changes in medical history, any new surgeries or procedures? NO    If yes, update Summary List   Subjective Functional Status/Changes:  []  No changes reported     Pt reports his right knee hurts if he bending it all the way back         OBJECTIVE  Modalities Rationale:  n/a       18 min Therapeutic Exercise:  [x]  See flow sheet   Rationale:      increase ROM and increase strength to improve the patients ability to perform self care     9 min Therapeutic Activity: [x]  See flow sheet   Rationale:      increase ROM, increase strength, improve coordination, improve balance, and increase proprioception to improve the patients ability to perform self care     16 min Neuromuscular Re-ed: [x]  See flow sheet   Rationale:      increase ROM, increase strength, improve coordination, improve balance, and increase proprioception to improve the patients ability to perform self care         Billed With/As:   [] TE   [] TA   [] Neuro   [] Self Care Patient Education: [x] Review HEP    [] Progressed/Changed HEP based on:   [] positioning   [] body mechanics   [] transfers   [] heat/ice application    [] other:        Other Objective/Functional Measures:     Add rec bike x 3 min- min A to get on and off of bike for safety     Post Treatment Pain Level (on 0 to 10) scale:   0  / 10     ASSESSMENT    Assessment/Changes in Function: Extensive VCs throughout treatment for sequencing and focus on task especially with transfers for safety     []  See Progress Note/Recertification   Patient will continue to benefit from skilled PT services to modify and progress therapeutic interventions, address functional mobility deficits, address ROM deficits, address strength deficits, analyze and cue movement patterns, analyze and modify body mechanics/ergonomics, assess and modify postural abnormalities, address imbalance/dizziness, and instruct in home and community integration to attain remaining goals.       Progress toward goals / Updated goals:    Good Progress to    [] STG    [x] LTG  2 as shown by decreasing assist for sit to stand from higher surfaces       PLAN    [x]  Upgrade activities as tolerated YES Continue plan of care   []  Discharge due to :    []  Other:      Therapist: Héctor Mendoza PTA    Date: 8/24/2022 Time: 11:12 AM     Future Appointments   Date Time Provider Ivette Funez   8/24/2022 11:15 AM Iris Ron PTA [facility]   8/26/2022 11:15 AM Duong Cedeno, PT [facility]   8/29/2022 11:00 AM Duong Cedeno, PT [facility]   8/31/2022 11:15 AM Iris Ron, PTA [facility]   9/2/2022 11:15 AM Iris Ron, PTA [facility]   9/6/2022 10:30 AM Duong Cedeno, PT [facility]   9/7/2022 10:30 AM Delphia Sermon, PTA [facility]   9/9/2022 11:15 AM Duong Cedeno, PT [facility]   9/12/2022 11:00 AM Duong Cedeno, PT [facility]   9/14/2022 10:30 AM Iris Ron, PTA [facility]   9/16/2022 10:30 AM Duong Cedeno PT [facility]   11/3/2022 11:00 AM Luis Fernando Garcia MD [facility] BS AMB   12/2/2022  1:30 PM Everardo Mckeon MD [facility] BS AMB

## 2022-08-26 ENCOUNTER — HOSPITAL ENCOUNTER (OUTPATIENT)
Dept: PHYSICAL THERAPY | Age: 67
Discharge: HOME | End: 2022-08-26
Payer: MEDICARE

## 2022-08-26 PROCEDURE — 97116 GAIT TRAINING THERAPY: CPT

## 2022-08-26 PROCEDURE — 97112 NEUROMUSCULAR REEDUCATION: CPT

## 2022-08-26 PROCEDURE — 97110 THERAPEUTIC EXERCISES: CPT

## 2022-08-26 NOTE — PROGRESS NOTES
PHYSICAL THERAPY - DAILY TREATMENT NOTE     Patient Name: Shen Love        Date: 2022  : 1955   YES Patient  Verified  Visit #:     Insurance: Payor: Pato Gill / Plan: [employer] / Product Type: Managed Care Medicare /      In time: 1110 Out time: 1210   Total Treatment Time: 60     Medicare/[facility] Time Tracking (below)   Total Timed Codes (min):  60 1:1 Treatment Time:  60     TREATMENT AREA =  Right leg weakness [R29.898]    SUBJECTIVE    Pain Level (on 0 to 10 scale):  0  / 10   Medication Changes/New allergies or changes in medical history, any new surgeries or procedures? NO    If yes, update Summary List   Subjective Functional Status/Changes:  []  No changes reported     No back pain, mild knee pain R         OBJECTIVE    20 min Therapeutic Exercise:  [x]  See flow sheet   Rationale:      increase ROM and increase strength to improve the patients ability to perform ADLs      20 min Neuromuscular Re-ed: [x]  See flow sheet   Rationale:      improve coordination, improve balance, and increase proprioception to improve the patients ability to perform ADLs      20 min Gait Training:  _200_x2_ feet with _RW__ device on level surfaces with __S_ level of assistance   Rationale: To improve ambulation safety and efficiency in order to improve patient's ability to safely ambulate at home for self care. Billed With/As:   [] TE   [] TA   [] Neuro   [] Self Care Patient Education: [x] Review HEP    [] Progressed/Changed HEP based on:   [] positioning   [] body mechanics   [] transfers   [] heat/ice application    [] other:        Other Objective/Functional Measures:    Sit to stand from raised surface Supervision to min assist--inconsistent.   Requires cueing    Ambulates with RW and supervision but inconsistent advancing of L LE and significant slowing and steppage with turns   Post Treatment Pain Level (on 0 to 10) scale:   0  / 10 ASSESSMENT    Assessment/Changes in Function:     Limited motor planning with change in directions, turning to sit     []  See Progress Note/Recertification   Patient will continue to benefit from skilled PT services to modify and progress therapeutic interventions, address functional mobility deficits, address ROM deficits, address strength deficits, address imbalance/dizziness, and instruct in home and community integration to attain remaining goals.       Progress toward goals / Updated goals:    Fair Progress to    [] STG    [x] LTG  4 / 5as shown by inconsistent transfers/ gait       []  See Progress Note/Recertification    PLAN    [x]  Upgrade activities as tolerated {YES) Continue plan of care   []  Discharge due to :    []  Other:      Therapist: Markel Forbes PT    Date: 8/26/2022 Time: 11:07 AM     Future Appointments   Date Time Provider Ivette Funez   8/26/2022 11:15 AM Emogene Anurag, PT [facility]   8/29/2022 11:00 AM Emogene Anurag, PT [facility]   8/31/2022 11:15 AM Beth Dubon, PTA [facility]   9/2/2022 11:15 AM Emogene Anurag, PT [facility]   9/6/2022 10:30 AM Emogene Anurag, PT [facility]   9/7/2022 10:30 AM Beth Dubon PTA [facility]   9/9/2022 11:15 AM Emogene Anurag, PT [facility]   9/12/2022 11:00 AM Emogene Anurag, PT [facility]   9/14/2022 10:30 AM Beth Dubon PTA [facility]   9/16/2022 10:30 AM Emogene Anurag, PT [facility]   11/3/2022 11:00 AM Ricardo Valdez MD [facility] BS AMB   12/2/2022  1:30 PM Shirley Cobb MD [facility] BS AMB

## 2022-08-29 ENCOUNTER — HOSPITAL ENCOUNTER (OUTPATIENT)
Dept: PHYSICAL THERAPY | Age: 67
Discharge: HOME | End: 2022-08-29
Payer: MEDICARE

## 2022-08-29 PROCEDURE — 97116 GAIT TRAINING THERAPY: CPT

## 2022-08-29 PROCEDURE — 97110 THERAPEUTIC EXERCISES: CPT

## 2022-08-29 PROCEDURE — 97112 NEUROMUSCULAR REEDUCATION: CPT

## 2022-08-29 NOTE — PROGRESS NOTES
PHYSICAL THERAPY - DAILY TREATMENT NOTE     Patient Name: Dione Lyn        Date: 2022  : 1955   YES Patient  Verified  Visit #:     Insurance: Payor: [employer] MEDICARE / Plan: [address] / Product Type: Managed Care Medicare /      In time:  Out time: 1015   Total Treatment Time: 53     Medicare/BCBS Time Tracking (below)   Total Timed Codes (min):  53 1:1 Treatment Time:  53     TREATMENT AREA =  Right leg weakness [R29.898]    SUBJECTIVE    Pain Level (on 0 to 10 scale):  0  / 10   Medication Changes/New allergies or changes in medical history, any new surgeries or procedures? NO    If yes, update Summary List   Subjective Functional Status/Changes:  []  No changes reported     R knee pain after last session, maybe due to diag wt shift         OBJECTIVE    20 min Therapeutic Exercise:  [x]  See flow sheet   Rationale:      increase ROM and increase strength to improve the patients ability to perform ADLs      20 min Neuromuscular Re-ed: [x]  See flow sheet   Rationale:      improve coordination, improve balance, and increase proprioception to improve the patients ability to perform ADLs      13 min Gait Training:  _200_x1_ feet with _RW__ device on level surfaces with __S_ level of assistance   Rationale: To improve ambulation safety and efficiency in order to improve patient's ability to safely ambulate at home for self care.     Billed With/As:   [] TE   [] TA   [] Neuro   [] Self Care Patient Education: [x] Review HEP    [] Progressed/Changed HEP based on:   [] positioning   [] body mechanics   [] transfers   [] heat/ice application    [] other:        Other Objective/Functional Measures:    Cont difficulty with motor planning zo noted when turning to sit    Requires min assist with all sit to stand transfers today (from raised surface)   Post Treatment Pain Level (on 0 to 10) scale:   0  / 10     ASSESSMENT    Assessment/Changes in Function:     Min change. Difficulty with consistent advancement of L LE, difficulty with turning to sit, assist for all transfers     []  See Progress Note/Recertification   Patient will continue to benefit from skilled PT services to modify and progress therapeutic interventions, address functional mobility deficits, address ROM deficits, address strength deficits, analyze and cue movement patterns, address imbalance/dizziness, and instruct in home and community integration to attain remaining goals.       Progress toward goals / Updated goals:    Min change in function this day       []  See Progress Note/Recertification    PLAN    [x]  Upgrade activities as tolerated {YES) Continue plan of care   []  Discharge due to :    []  Other:      Therapist: Awilda Horne PT    Date: 8/29/2022 Time: 10:52 AM     Future Appointments   Date Time Provider Ivette Funez   8/29/2022 11:00 AM Rebeccaayni Paez, PT [facility]   8/31/2022 11:15 AM Castro Lillian, PTA [facility]   9/2/2022 11:15 AM Rebecca Magkasandra, PT [facility]   9/6/2022 10:30 AM Rebecca Magic, PT [facility]   9/7/2022 10:30 AM Castro Lillian, PTA [facility]   9/9/2022 11:15 AM Rebecca Magic, PT [facility]   9/12/2022 11:00 AM Rebecca Magic, PT [facility]   9/14/2022 10:30 AM Castro Lillian, PTA [facility]   9/16/2022 10:30 AM Rebecca Magic, PT [facility]   11/3/2022 11:00 AM Ashish Jj MD [facility] BS AMB   12/2/2022  1:30 PM Fauzia Swann MD [facility] BS AMB

## 2022-08-31 ENCOUNTER — HOSPITAL ENCOUNTER (OUTPATIENT)
Dept: PHYSICAL THERAPY | Age: 67
Discharge: HOME | End: 2022-08-31
Payer: MEDICARE

## 2022-08-31 PROCEDURE — 97116 GAIT TRAINING THERAPY: CPT

## 2022-08-31 PROCEDURE — 97112 NEUROMUSCULAR REEDUCATION: CPT

## 2022-08-31 PROCEDURE — 97110 THERAPEUTIC EXERCISES: CPT

## 2022-08-31 NOTE — PROGRESS NOTES
PHYSICAL THERAPY - DAILY TREATMENT NOTE     Patient Name: Evette Kumar        Date: 2022  : 1955   YES Patient  Verified  Visit #:   32   of   30  Insurance: Payor: [employer] MEDICARE / Plan: [address] / Product Type: Managed Care Medicare /      In time: 11:16 am Out time: 12:00 pm   Total Treatment Time: 46     Medicare/BCBS Time Tracking (below)   Total Timed Codes (min):  46 1:1 Treatment Time:  46     TREATMENT AREA =  Right leg weakness [R29.898]    SUBJECTIVE    Pain Level (on 0 to 10 scale):  0  / 10   Medication Changes/New allergies or changes in medical history, any new surgeries or procedures? NO    If yes, update Summary List   Subjective Functional Status/Changes:  []  No changes reported     The right knee has been sore       OBJECTIVE  Modalities Rationale:  n/a       26 min Therapeutic Exercise:  [x]  See flow sheet   Rationale:      increase ROM, increase strength, and improve coordination to improve the patients ability to perform ADLs       11 min Neuromuscular Re-ed: [x]  See flow sheet   Rationale:      increase ROM, improve coordination, improve balance, and increase proprioception to improve the patients ability to perform ADLs           9 min Gait Training:  _110__ feet with _RW__ device on level surfaces with _S__ level of assistance   Rationale: To improve ambulation safety and efficiency in order to improve patient's ability to safely ambulate at home for self care.         Billed With/As:   [] TE   [] TA   [] Neuro   [] Self Care Patient Education: [x] Review HEP    [] Progressed/Changed HEP based on:   [] positioning   [] body mechanics   [] transfers   [] heat/ice application    [] other:        Other Objective/Functional Measures:    Advanced reps standing hip for flexion and abduction     Post Treatment Pain Level (on 0 to 10) scale:   0  / 10     ASSESSMENT    Assessment/Changes in Function:     Difficulty with motor planning persists zo during gait for advancing L LE into step through gait pattern and turning for transfers     []  See Progress Note/Recertification   Patient will continue to benefit from skilled PT services to modify and progress therapeutic interventions, address functional mobility deficits, address ROM deficits, address strength deficits, analyze and cue movement patterns, analyze and modify body mechanics/ergonomics, assess and modify postural abnormalities, and address imbalance/dizziness to attain remaining goals.       Progress toward goals / Updated goals:    Slow Progress to    [] STG    [x] LTG  1-2  as shown by slight decreasing fatigue with exercise, improving control in sit to stand from higher surfaces       PLAN    [x]  Upgrade activities as tolerated YES Continue plan of care   []  Discharge due to :    []  Other:      Therapist: Sammie Hughes PTA    Date: 8/31/2022 Time: 12:00 pm     Future Appointments   Date Time Provider Ivette Funez   8/31/2022 11:15 AM Matthew Snyder, PTA [facility]   9/2/2022 11:15 AM Rebeccayani Paez, PT [facility]   9/6/2022 10:30 AM Rebecca Magkasandra, PT [facility]   9/7/2022 10:30 AM Castro Lillian, PTA [facility]   9/9/2022 11:15 AM Rebeccayani Paez, PT [facility]   9/12/2022 11:00 AM Rebeccayani Paez, PT [facility]   9/14/2022 10:30 AM Castro Lillian, PTA [facility]   9/16/2022 10:30 AM Castro Lillian, PTA [facility]   11/3/2022 11:00 AM Ashish Jj MD [facility] BS AMB   12/2/2022  1:30 PM Fauzia Swann MD FP BS AMB

## 2022-09-02 ENCOUNTER — HOSPITAL ENCOUNTER (OUTPATIENT)
Dept: PHYSICAL THERAPY | Age: 67
Discharge: HOME | End: 2022-09-02
Payer: MEDICARE

## 2022-09-02 PROCEDURE — 97110 THERAPEUTIC EXERCISES: CPT

## 2022-09-02 PROCEDURE — 97112 NEUROMUSCULAR REEDUCATION: CPT

## 2022-09-02 PROCEDURE — 97116 GAIT TRAINING THERAPY: CPT

## 2022-09-02 NOTE — PROGRESS NOTES
PHYSICAL THERAPY - DAILY TREATMENT NOTE     Patient Name: See Walker        Date: 2022  : 1955   YES Patient  Verified  Visit #:   32   of   30  Insurance: Payor: [employer] MEDICARE / Plan: [employer] / Product Type: Managed Care Medicare /      In time: 8130 Out time: 1200   Total Treatment Time: 45     Medicare/BCBS Time Tracking (below)   Total Timed Codes (min):  45 1:1 Treatment Time:  45     TREATMENT AREA =  Right leg weakness [R29.898]    SUBJECTIVE    Pain Level (on 0 to 10 scale):  0  / 10   Medication Changes/New allergies or changes in medical history, any new surgeries or procedures? NO    If yes, update Summary List   Subjective Functional Status/Changes:  []  No changes reported     R knee stiffness         OBJECTIVE  15 min Therapeutic Exercise:  [x]  See flow sheet   Rationale:      increase ROM and increase strength to improve the patients ability to perform ADLs      20 min Neuromuscular Re-ed: [x]  See flow sheet   Rationale:      improve coordination, improve balance, and increase proprioception to improve the patients ability to perform ADLs      10 min Gait Training:  _200_x1_ feet with _RW__ device on level surfaces with __S_ level of assistance   Rationale: To improve ambulation safety and efficiency in order to improve patient's ability to safely ambulate at home for self care.     Billed With/As:   [x] TE   [] TA   [x] Neuro   [] Self Care Patient Education: [x] Review HEP    [] Progressed/Changed HEP based on:   [] positioning   [] body mechanics   [] transfers   [] heat/ice application    [] other:        Other Objective/Functional Measures:    Indep sit to stand from raised surface 2/5 tries--requires sign cueing    Cont difficulty with continuous advancement of L LE during gait   Post Treatment Pain Level (on 0 to 10) scale:   0  / 10     ASSESSMENT    Assessment/Changes in Function:     Min change; deferred diag wt shifts due to knee pain     []  See Progress Note/Recertification   Patient will continue to benefit from skilled PT services to modify and progress therapeutic interventions, address functional mobility deficits, address ROM deficits, address strength deficits, analyze and address soft tissue restrictions, analyze and cue movement patterns, and instruct in home and community integration to attain remaining goals.       Progress toward goals / Updated goals:    Inconsistent transfers/ inconsistent advancing of L LE during gait       []  See Progress Note/Recertification    PLAN    [x]  Upgrade activities as tolerated {YES) Continue plan of care   []  Discharge due to :    []  Other:      Therapist: Markel Forbes, PT    Date: 9/2/2022 Time: 11:38 AM     Future Appointments   Date Time Provider Ivette Funez   9/6/2022 10:30 AM Emogene Anurag, PT [facility]   9/7/2022 10:30 AM Beth Dubon, PTA [facility]   9/9/2022 11:15 AM Emogene Anurag, PT [facility]   9/12/2022 11:00 AM Emogene Anurag, PT [facility]   9/14/2022 10:30 AM Beth Dubon, PTA [facility]   9/16/2022 10:30 AM Beth Dubon PTA [facility]   11/3/2022 11:00 AM Ricardo Valdez MD [facility] BS AMB   12/2/2022  1:30 PM Shirley Cobb MD FP BS AMB

## 2022-09-06 ENCOUNTER — HOSPITAL ENCOUNTER (OUTPATIENT)
Dept: PHYSICAL THERAPY | Age: 67
Discharge: HOME | End: 2022-09-06
Payer: MEDICARE

## 2022-09-06 PROCEDURE — 97112 NEUROMUSCULAR REEDUCATION: CPT

## 2022-09-06 PROCEDURE — 97110 THERAPEUTIC EXERCISES: CPT

## 2022-09-06 PROCEDURE — 97116 GAIT TRAINING THERAPY: CPT

## 2022-09-06 NOTE — PROGRESS NOTES
PHYSICAL THERAPY - DAILY TREATMENT NOTE     Patient Name: Jareth Mcintyre        Date: 2022  : 1955   YES Patient  Verified  Visit #:     Insurance: Payor: [employer] MEDICARE / Plan: [address] / Product Type: Managed Care Medicare /      In time: 4661 Out time: 1115   Total Treatment Time: 55     Medicare/BCBS Time Tracking (below)   Total Timed Codes (min):  55 1:1 Treatment Time:  55     TREATMENT AREA =  Right leg weakness [R29.898]    SUBJECTIVE    Pain Level (on 0 to 10 scale):  0  / 10   Medication Changes/New allergies or changes in medical history, any new surgeries or procedures? NO    If yes, update Summary List   Subjective Functional Status/Changes:  []  No changes reported     R knee painful         OBJECTIVE    20 min Therapeutic Exercise:  [x]  See flow sheet   Rationale:      increase ROM and increase strength to improve the patients ability to perform ADLs with ease/ safely     20 min Neuromuscular Re-ed: [x]  See flow sheet   Rationale:      increase strength, improve coordination, improve balance, and increase proprioception to improve the patients ability to perform ADLs with ease/ safely     15 min Gait Training:  200 x 1, 75 x 1___ feet with _RW__ device on level surfaces with __S_ level of assistance   Rationale: To improve ambulation safety and efficiency in order to improve patient's ability to safely ambulate at home for self care.       Billed With/As:   [] TE   [] TA   [] Neuro   [] Self Care Patient Education: [x] Review HEP    [] Progressed/Changed HEP based on:   [] positioning   [] body mechanics   [] transfers   [] heat/ice application    [] other:        Other Objective/Functional Measures:    Transfers sit to stand (from raised surface) 3/5 times indep    Limited advancing of L LE during amb past right foot-probably due to R knee pain   Post Treatment Pain Level (on 0 to 10) scale:   0  / 10 ASSESSMENT    Assessment/Changes in Function:     Improved sit to stand transfers    R knee pain limiting full weight shift     []  See Progress Note/Recertification   Patient will continue to benefit from skilled PT services to modify and progress therapeutic interventions, address functional mobility deficits, address ROM deficits, address strength deficits, address imbalance/dizziness, and instruct in home and community integration to attain remaining goals.       Progress toward goals / Updated goals:    Improved consistency of sit to stand       []  See Progress Note/Recertification    PLAN    [x]  Upgrade activities as tolerated {YES) Continue plan of care   []  Discharge due to :    []  Other:      Therapist: Elder Shaver PT    Date: 9/6/2022 Time: 10:16 AM     Future Appointments   Date Time Provider Ivette Funez   9/6/2022 10:30 AM David Cárdenas PT [facility]   9/7/2022 10:30 AM Jackie Guidry, PTA [facility]   9/9/2022 11:15 AM David Cárdenas PT [facility]   9/12/2022 11:00 AM David Cárdenas PT [facility]   9/14/2022 10:30 AM Jackie Guidry, PTA [facility]   9/16/2022 10:30 AM Jackie Guidry, PTA [facility]   11/3/2022 11:00 AM Bismark Dunaway MD [facility] BS AMB   12/2/2022  1:30 PM Marlon Trinidad MD FP BS AMB

## 2022-09-07 ENCOUNTER — HOSPITAL ENCOUNTER (OUTPATIENT)
Dept: PHYSICAL THERAPY | Age: 67
Discharge: HOME | End: 2022-09-07
Payer: MEDICARE

## 2022-09-07 PROCEDURE — 97112 NEUROMUSCULAR REEDUCATION: CPT

## 2022-09-07 PROCEDURE — 97110 THERAPEUTIC EXERCISES: CPT

## 2022-09-07 PROCEDURE — 97530 THERAPEUTIC ACTIVITIES: CPT

## 2022-09-07 PROCEDURE — 97116 GAIT TRAINING THERAPY: CPT

## 2022-09-07 RX ORDER — HYDRALAZINE HYDROCHLORIDE 25 MG/1
25 TABLET, FILM COATED ORAL 3 TIMES DAILY
COMMUNITY
End: 2022-09-07 | Stop reason: SDUPTHER

## 2022-09-07 RX ORDER — ATORVASTATIN CALCIUM 40 MG/1
40 TABLET, FILM COATED ORAL DAILY
Qty: 90 TABLET | Refills: 1 | Status: SHIPPED | OUTPATIENT
Start: 2022-09-07

## 2022-09-07 RX ORDER — HYDRALAZINE HYDROCHLORIDE 25 MG/1
25 TABLET, FILM COATED ORAL 3 TIMES DAILY
Qty: 270 TABLET | Refills: 1 | Status: SHIPPED | OUTPATIENT
Start: 2022-09-07 | End: 2022-10-14 | Stop reason: SDUPTHER

## 2022-09-07 NOTE — PROGRESS NOTES
PHYSICAL THERAPY - DAILY TREATMENT NOTE     Patient Name: Kristy Mesa        Date: 2022  : 1955   YES Patient  Verified  Visit #:   34   of   30  Insurance: Payor: [employer] MEDICARE / Plan: [address] / Product Type: Managed Care Medicare /      In time: 10:42 Out time: 1140   Total Treatment Time: 58     Medicare/BCBS Time Tracking (below)   Total Timed Codes (min):  58 1:1 Treatment Time:  54     TREATMENT AREA =  Right leg weakness [R29.898]    SUBJECTIVE    Pain Level (on 0 to 10 scale):  0  / 10   Medication Changes/New allergies or changes in medical history, any new surgeries or procedures? NO    If yes, update Summary List   Subjective Functional Status/Changes:  []  No changes reported     Pt reports he is tired today. OBJECTIVE  Modalities Rationale:  n/a       8 min Therapeutic Exercise:  [x]  See flow sheet   Rationale:      increase ROM, increase strength, improve coordination, and increase proprioception to improve the patients ability to perform self care     14 min Therapeutic Activity: [x]  See flow sheet   Rationale:      increase ROM, increase strength, improve coordination, improve balance, and increase proprioception to improve the patients ability to perform self care     23 min Neuromuscular Re-ed: [x]  See flow sheet   Rationale:      increase ROM, improve coordination, improve balance, and increase proprioception to improve the patients ability to decrease fall risk           13 min Gait Training:  _90 ftx1; 30 ft x 3__ feet with __RW_ device on level surfaces with _CG/S__ level of assistance   Rationale: To improve ambulation safety and efficiency in order to improve patient's ability to safely ambulate at home for self care.         Billed With/As:   [] TE   [] TA   [] Neuro   [] Self Care Patient Education: [x] Review HEP    [] Progressed/Changed HEP based on:   [] positioning   [] body mechanics   [] transfers   [] heat/ice application    [] other:        Other Objective/Functional Measures: Add static standing without UE support ~ 2 min; forward and retro amb in // bars with UE      Post Treatment Pain Level (on 0 to 10) scale:   0  / 10     ASSESSMENT    Assessment/Changes in Function:     VCs for left LE advancement to increase stride length during ambulation; sit to stand improving to SBA/modified I from higher mat surfacttes     []  See Progress Note/Recertification   Patient will continue to benefit from skilled PT services to modify and progress therapeutic interventions, address functional mobility deficits, address ROM deficits, address strength deficits, analyze and address soft tissue restrictions, analyze and cue movement patterns, analyze and modify body mechanics/ergonomics, address imbalance/dizziness, and instruct in home and community integration to attain remaining goals.       Progress toward goals / Updated goals:    Slow Progress to    [] STG    [x] LTG  2 as shown by improving static balance without UE support       PLAN    [x]  Upgrade activities as tolerated YES Continue plan of care   []  Discharge due to :    []  Other:      Therapist: Johan Enriquez PTA    Date: 9/7/2022 Time: 11:40 AM     Future Appointments   Date Time Provider Ivette Funez   9/7/2022 10:30 AM Caity Alvarado PTA [facility]   9/9/2022 11:15 AM Arturo Reno PT [facility]   9/12/2022 11:00 AM Arturo Reno PT [facility]   9/14/2022 10:30 AM Caity Alvarado, PTA [facility]   9/16/2022 10:30 AM Caity Alvarado, PTA [facility]   11/3/2022 11:00 AM Jimena Horner MD [facility] BS AMB   12/2/2022  1:30 PM Maria Del Rosario King MD [facility] BS AMB

## 2022-09-09 ENCOUNTER — HOSPITAL ENCOUNTER (OUTPATIENT)
Dept: PHYSICAL THERAPY | Age: 67
Discharge: HOME | End: 2022-09-09
Payer: MEDICARE

## 2022-09-09 PROCEDURE — 97112 NEUROMUSCULAR REEDUCATION: CPT

## 2022-09-09 PROCEDURE — 97116 GAIT TRAINING THERAPY: CPT

## 2022-09-09 PROCEDURE — 97110 THERAPEUTIC EXERCISES: CPT

## 2022-09-09 NOTE — PROGRESS NOTES
[address] [facility] PHYSICAL THERAPY AT [facility] [address] 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 Independent with ADLs/ iADLs prior   Comorbidities: Aneurysm 2021 with residual L hemiparesis, arthritis, LBP, HTN, visual impairment   Medications: Verified on Patient Summary List   Visits from [facility]: 30 Missed Visits: -        Previous Goals:  1. Patient will report at least 50% reduction of symptoms with ADLs. 2. Patient will be independent with self progression of HEP and demonstrate willingness to continue HEP after D/C to maximize/maintain gains in functional mobility. 3. Tinetti's will improve to greater than or equal to 17/28 points to demonstrate significant reduction of imbalance with ADLs. 4. Patient to be independent with all transfers  5.  Patient to ambulate independently >= 300 feet with least restrictive assistive device                   Prior Level/Current Level:  1) Prior Level: 40%                 Current Level: 75%                 Goal Met? yes  2) Prior Level: indep with current HEP                 Current Level: indep with current HEP                 Goal Met? yes  3) Prior Level:                  Current Level:                  Goal Met? progressing  4) Prior Level: supervision to transfer sit to stand from raised surface(not fully consistent); mod assist for bed transfers                 Current Level: supervision to transfer sit to stand from raised surface(not fully consistent, but improved); bed transfers deferred due to LBP                 Goal Met? progressing  3) Prior Level: 200 feet with RW and supervision                 Current Level: 250 feet with RW and supervision                 Goal Met? progressing     Key Functional Changes/Progress: Patient states that he is better able to ambulate through the house independently. His ability to transition sit to stand (from raised surface) is independent more consistently. He is more mobile through the course of the day and has increased his social activity. He tolerates sustained standing without UE support for > 2 minutes. Problem List: pain affecting function, decrease ROM, decrease strength, edema affecting function, impaired gait/ balance, decrease ADL/ functional abilitiies, decrease activity tolerance, and decrease transfer abilities                 Treatment Plan may include any combination of the following: Therapeutic exercise, Therapeutic activities, Neuromuscular re-education, Gait/balance training, Patient education, Self Care training, and Home safety training  Patient Goal(s) has been updated and includes:      Goals for this certification period include and are to be achieved in   4-5  weeks:   continue towards LTGs as stated above    Frequency / Duration:       Patient to be seen   2-3   times per week for   4    weeks:    Assessments/Recommendations: continue PT per current POC with decreasing freq to 2x/week  If you have any questions/comments please contact us directly at [phone]. Thank you for allowing us to assist in the care of your patient. Therapist Signature: Clyde James PT Date: 9/1/6903   Certification Period:  Reporting Period: 9/9/22 to 12/6/22 8/17/22 to 9/9/22 Time: 11 am   NOTE TO PHYSICIAN:  PLEASE COMPLETE THE ORDERS BELOW AND FAX TO   [facility] at [address]: [phone].   If you are unable to process this request in 24 hours please contact our office: [phone].     ___ I have read the above report and request that my patient continue as recommended.   ___ I have read the above report and request that my patient continue therapy with the following changes/special instructions: ________________________________________________   ___ I have read the above report and request that my patient be discharged from therapy.       Physician Signature:                                                                       Date:                                     Time:                  Shirley Cobb MD.

## 2022-09-09 NOTE — PROGRESS NOTES
PHYSICAL THERAPY - DAILY TREATMENT NOTE     Patient Name: Henry Williamson        Date: 2022  : 1955   YES Patient  Verified  Visit #:   27   of   40  Insurance: Payor: [employer] MEDICARE / Plan: [address] / Product Type: Managed Care Medicare /      In time:  Out time: 867   Total Treatment Time: 50     Medicare/BCBS Time Tracking (below)   Total Timed Codes (min):  50 1:1 Treatment Time:  50     TREATMENT AREA =  Right leg weakness [R29.898]    SUBJECTIVE    Pain Level (on 0 to 10 scale):  0  / 10   Medication Changes/New allergies or changes in medical history, any new surgeries or procedures? NO    If yes, update Summary List   Subjective Functional Status/Changes:  []  No changes reported     See PN         OBJECTIVE  20 min Therapeutic Exercise:  [x]  See flow sheet   Rationale:      increase ROM and increase strength to improve the patients ability to perform ADLs with ease/ safely      20 min Neuromuscular Re-ed: [x]  See flow sheet   Rationale:      increase strength, improve coordination, improve balance, and increase proprioception to improve the patients ability to perform ADLs with ease/ safely      10 min Gait Training:  200 x 1, 30x 1___ feet with _rollator__ device on level surfaces with __CGA  level of assistance   Rationale: To improve ambulation safety and efficiency in order to improve patient's ability to safely ambulate at home for self care.        Billed With/As:   [x] TE   [] TA   [x] Neuro   [] Self Care Patient Education: [x] Review HEP    [] Progressed/Changed HEP based on:   [] positioning   [] body mechanics   [] transfers   [] heat/ice application    [] other:        Other Objective/Functional Measures:    See PN    Used rollator for gait, but pt requires CGA   Post Treatment Pain Level (on 0 to 10) scale:   0  / 10     ASSESSMENT    [x]  See Progress Note/Recertification    PLAN    [x]  Upgrade activities as tolerated {YES) Continue plan of care   []  Discharge due to :    []  Other:      Therapist: Helen Patrick, PT    Date: 9/9/2022 Time: 9:21 AM     Future Appointments   Date Time Provider Ivette Funez   9/9/2022 11:15 AM Clint Candelario, PT [facility]   9/12/2022 11:00 AM Clint Canedlario PT [facility]   9/14/2022 10:30 AM Terence Garcia PTA [facility]   9/16/2022 10:30 AM Terence Garcia PTA [facility]   11/3/2022 11:00 AM Víctor Marley MD [facility] BS AMB   12/2/2022  1:30 PM Susy Hdez MD [facility] BS AMB

## 2022-09-12 ENCOUNTER — HOSPITAL ENCOUNTER (OUTPATIENT)
Dept: PHYSICAL THERAPY | Age: 67
Discharge: HOME | End: 2022-09-12
Payer: MEDICARE

## 2022-09-12 PROCEDURE — 97110 THERAPEUTIC EXERCISES: CPT

## 2022-09-12 PROCEDURE — 97112 NEUROMUSCULAR REEDUCATION: CPT

## 2022-09-12 PROCEDURE — 97116 GAIT TRAINING THERAPY: CPT

## 2022-09-12 NOTE — PROGRESS NOTES
PHYSICAL THERAPY - DAILY TREATMENT NOTE     Patient Name: Blanca Prakash        Date: 2022  : 1955   YES Patient  Verified  Visit #:   32   of   40  Insurance: Payor: [employer] MEDICARE / Plan: [employer] / Product Type: Managed Care Medicare /      In time: 9864 Out time: 1200   Total Treatment Time: 50     Medicare/BCBS Time Tracking (below)   Total Timed Codes (min):  50 1:1 Treatment Time:  50     TREATMENT AREA =  Right leg weakness [R29.898]    SUBJECTIVE    Pain Level (on 0 to 10 scale):  0  / 10   Medication Changes/New allergies or changes in medical history, any new surgeries or procedures? NO    If yes, update Summary List   Subjective Functional Status/Changes:  []  No changes reported     frustrated         OBJECTIVE      20 min Therapeutic Exercise:  [x]  See flow sheet   Rationale:      increase ROM and increase strength to improve the patients ability to perform ADLs/ iADLs/ gait     20 min Neuromuscular Re-ed: [x]  See flow sheet   Rationale:      increase strength, improve coordination, improve balance, and increase proprioception to improve the patients ability to perform ADLs/ iADLs       10 min Gait Training:  __250_ feet with _rollator__ device on level surfaces with _CGA__ level of assistance   Rationale: To improve ambulation safety and efficiency in order to improve patient's ability to safely ambulate at home for self care.         Billed With/As:   [x] TE   [] TA   [x] Neuro   [] Self Care Patient Education: [x] Review HEP    [] Progressed/Changed HEP based on:   [] positioning   [] body mechanics   [] transfers   [] heat/ice application    [] other:        Other Objective/Functional Measures:    Pt with difficulty navigating rollator due to uneven steppage; requires CGA and verbal cues for L neglect    Stood for 3 minutes, partial use of UE for support   Post Treatment Pain Level (on 0 to 10) scale:   0  / 10 ASSESSMENT    Assessment/Changes in Function:     Attempting to use rollator, but requires higher level of assistance than with RW     []  See Progress Note/Recertification   Patient will continue to benefit from skilled PT services to modify and progress therapeutic interventions, address functional mobility deficits, address ROM deficits, address strength deficits, analyze and cue movement patterns, address imbalance/dizziness, and instruct in home and community integration to attain remaining goals.       Progress toward goals / Updated goals:    Slow progress advancing gait       []  See Progress Note/Recertification    PLAN    [x]  Upgrade activities as tolerated {YES) Continue plan of care   []  Discharge due to :    []  Other:      Therapist: [place] [facility]    Date: 9/12/2022 Time: 11:09 AM     Future Appointments   Date Time Provider Ivette Funez   9/14/2022 10:30 AM Samy Mcintosh PTA [facility]   9/16/2022 10:30 AM Samy Mcintosh PTA [facility]   11/3/2022 11:00 AM Roberto Jj MD [facility] BS AMB   12/2/2022  1:30 PM Derick Gee MD [facility] BS AMB

## 2022-09-14 ENCOUNTER — HOSPITAL ENCOUNTER (OUTPATIENT)
Dept: PHYSICAL THERAPY | Age: 67
Discharge: HOME | End: 2022-09-14
Payer: MEDICARE

## 2022-09-14 PROCEDURE — 97116 GAIT TRAINING THERAPY: CPT

## 2022-09-14 PROCEDURE — 97530 THERAPEUTIC ACTIVITIES: CPT

## 2022-09-14 PROCEDURE — 97110 THERAPEUTIC EXERCISES: CPT

## 2022-09-14 NOTE — PROGRESS NOTES
PHYSICAL THERAPY - DAILY TREATMENT NOTE     Patient Name: Cori Babinski        Date: 2022  : 1955   YES Patient  Verified  Visit #:   28   of   40  Insurance: Payor: [employer] MEDICARE / Plan: [address] / Product Type: Managed Care Medicare /      In time: 10:47 am Out time: 11:33 am   Total Treatment Time: 46     Medicare/BCBS Time Tracking (below)   Total Timed Codes (min):  46 1:1 Treatment Time:  42     TREATMENT AREA =  Right leg weakness [R29.898]    SUBJECTIVE    Pain Level (on 0 to 10 scale):  5  / 10- left knee   Medication Changes/New allergies or changes in medical history, any new surgeries or procedures? NO    If yes, update Summary List   Subjective Functional Status/Changes:  []  No changes reported     Pt reports his left knee is stiff today         OBJECTIVE  Modalities Rationale:  n/a       16 min Therapeutic Exercise:  [x]  See flow sheet   Rationale:      increase ROM, increase strength, improve coordination, and increase proprioception to improve the patients ability to perform self care     10 min Therapeutic Activity: [x]  See flow sheet   Rationale:      increase ROM, increase strength, improve coordination, improve balance, and increase proprioception to improve the patients ability to perform self care     9 min Neuromuscular Re-ed: [x]  See flow sheet   Rationale:      increase ROM, increase strength, improve coordination, improve balance, and increase proprioception to improve the patients ability to perform self care           11 min Gait Training:  _75 ft; 100 ft_; 75 ft_ feet with _4 wheeled RW__ device on level surfaces with __CGA/SBA_ level of assistance   Rationale: To improve ambulation safety and efficiency in order to improve patient's ability to safely ambulate at home for self care.       Billed With/As:   [] TE   [] TA   [] Neuro   [] Self Care Patient Education: [x] Review HEP    [] Progressed/Changed HEP based on: [] positioning   [] body mechanics   [] transfers   [] heat/ice application    [] other:        Other Objective/Functional Measures:    Extra time  and VCs for safety in transfers and gait     Post Treatment Pain Level (on 0 to 10) scale:   5  / 10- Left knee     ASSESSMENT    Assessment/Changes in Function:     Pt challenged in equal step length and left neglect in gait with 4 wheeled walker especially for transfers and turns. CGA for safety in steering and VCs0     []  See Progress Note/Recertification   Patient will continue to benefit from skilled PT services to modify and progress therapeutic interventions, address functional mobility deficits, address ROM deficits, address strength deficits, analyze and address soft tissue restrictions, analyze and cue movement patterns, analyze and modify body mechanics/ergonomics, assess and modify postural abnormalities, address imbalance/dizziness, and instruct in home and community integration to attain remaining goals.       Progress toward goals / Updated goals:    Slow Progress to    [x] STG    [] LTG  1 as shown by slow progress in carry over with step doug in gait       PLAN    [x]  Upgrade activities as tolerated YES Continue plan of care   []  Discharge due to :    []  Other:      Therapist: Milli Pratt PTA    Date: 9/14/2022 Time: 11: 33  AM     Future Appointments   Date Time Provider Ivette Funez   9/14/2022 10:30 AM Bethanymili Babcock PTA [facility]   9/16/2022 10:30 AM Bethanymili Babcock, PTA [facility]   9/20/2022 11:00 AM Jesus Fitch, PT [facility]   9/22/2022 10:00 AM Jesus Fitch PT [facility]   9/27/2022 10:45 AM Bethany Poles, PTA [facility]   9/30/2022 10:30 AM Bethany Poles, PTA [facility]   10/4/2022 10:45 AM Bethany Yoko, PTA [facility]   10/7/2022 10:50 AM Bethany Babcock PTA [facility]   11/3/2022 11:00 AM Jaclyn Dumont MD [facility] BS AMB   12/2/2022  1:30 PM Michelle Bowen MD FP BS AMB

## 2022-09-16 ENCOUNTER — HOSPITAL ENCOUNTER (OUTPATIENT)
Dept: PHYSICAL THERAPY | Age: 67
Discharge: HOME | End: 2022-09-16
Payer: MEDICARE

## 2022-09-16 PROCEDURE — 97116 GAIT TRAINING THERAPY: CPT

## 2022-09-16 PROCEDURE — 97110 THERAPEUTIC EXERCISES: CPT

## 2022-09-16 PROCEDURE — 97112 NEUROMUSCULAR REEDUCATION: CPT

## 2022-09-16 NOTE — PROGRESS NOTES
PHYSICAL THERAPY - DAILY TREATMENT NOTE     Patient Name: Екатерина Dalton        Date: 2022  : 1955   YES Patient  Verified  Visit #:   35   of   40  Insurance: Payor: [employer] MEDICARE / Plan: [address] / Product Type: Managed Care Medicare /      In time: 10:26 Out time: 11:15 am   Total Treatment Time: 50     Medicare/BCBS Time Tracking (below)   Total Timed Codes (min):  50 1:1 Treatment Time:  47     TREATMENT AREA =  Right leg weakness [R29.898]    SUBJECTIVE    Pain Level (on 0 to 10 scale):  7  / 10-R knee   Medication Changes/New allergies or changes in medical history, any new surgeries or procedures? NO    If yes, update Summary List   Subjective Functional Status/Changes:  []  No changes reported     Pt reports his right knee is still bothering him. OBJECTIVE  Modalities Rationale:         30 min Therapeutic Exercise:  [x]  See flow sheet   Rationale:      increase ROM, increase strength, improve coordination, improve balance, and increase proprioception to improve the patients ability to decrease fall risk         10 min Neuromuscular Re-ed: [x]  See flow sheet   Rationale:      increase ROM, increase strength, improve coordination, improve balance, and increase proprioception to improve the patients ability to perform self care         10 min Gait Training:  _~ 100 ft__ feet with _RW__ device on level surfaces with _SBA/jCGA__ level of assistance   Rationale: To improve ambulation safety and efficiency in order to improve patient's ability to safely ambulate at home for self care. Billed With/As:   [] TE   [] TA   [] Neuro   [] Self Care Patient Education: [x] Review HEP    [] Progressed/Changed HEP based on:   [] positioning   [] body mechanics   [] transfers   [] heat/ice application    [] other:        Other Objective/Functional Measures:     Add standing March x 30 sec     Post Treatment Pain Level (on 0 to 10) scale:   7  / 10     ASSESSMENT    Assessment/Changes in Function:     Pt education in use of ice for left knee pain. Recommended to consult MD if sx persist.   Static balance and ambulation with 4 wheeled RW -limited by Right knee pain       []  See Progress Note/Recertification   Patient will continue to benefit from skilled PT services to modify and progress therapeutic interventions, address functional mobility deficits, address ROM deficits, address strength deficits, analyze and address soft tissue restrictions, analyze and cue movement patterns, analyze and modify body mechanics/ergonomics, assess and modify postural abnormalities, address imbalance/dizziness, and instruct in home and community integration to attain remaining goals.       Progress toward goals / Updated goals:  Sit to stand from higher surfaces SBA; moderate use of UE for sit to stand from lower levels          PLAN    [x]  Upgrade activities as tolerated YES Continue plan of care   []  Discharge due to :    []  Other:      Therapist: Sharon Duncan PTA    Date: 9/16/2022 Time: 11:15 AM     Future Appointments   Date Time Provider Ivette Funez   9/16/2022 10:30 AM Navi Anguiano, PTA [facility]   9/20/2022 11:00 AM De Floor, PT [facility]   9/22/2022 10:00 AM De Floor, PT [facility]   9/27/2022 10:45 AM Navi Anguiano, PTA [facility]   9/30/2022 10:30 AM Navi Anguiano, PTA [facility]   10/4/2022 10:45 AM Navi Anguiano, PTA [facility]   10/7/2022 10:50 AM Navi Anguiano, PTA [facility]   11/3/2022 11:00 AM Varun Nance MD [facility] BS AMB   12/2/2022  1:30 PM Daria Dye MD [facility] BS AMB

## 2022-09-20 ENCOUNTER — HOSPITAL ENCOUNTER (OUTPATIENT)
Dept: PHYSICAL THERAPY | Age: 67
Discharge: HOME | End: 2022-09-20
Payer: MEDICARE

## 2022-09-20 PROCEDURE — 97112 NEUROMUSCULAR REEDUCATION: CPT

## 2022-09-20 PROCEDURE — 97116 GAIT TRAINING THERAPY: CPT

## 2022-09-20 PROCEDURE — 97110 THERAPEUTIC EXERCISES: CPT

## 2022-09-20 NOTE — PROGRESS NOTES
PHYSICAL THERAPY - DAILY TREATMENT NOTE     Patient Name: Crystal Schuler        Date: 2022  : 1955   YES Patient  Verified  Visit #:   29   of   40  Insurance: Payor: [employer] MEDICARE / Plan: [employer] / Product Type: Managed Care Medicare /      In time: 1100 Out time: 3332     Total Treatment Time: 65     Medicare/BCBS Time Tracking (below)   Total Timed Codes (min):  55 1:1 Treatment Time:  55     TREATMENT AREA =  Right leg weakness [R29.898]    SUBJECTIVE    Pain Level (on 0 to 10 scale):  8  / 10   Medication Changes/New allergies or changes in medical history, any new surgeries or procedures? NO    If yes, update Summary List   Subjective Functional Status/Changes:  []  No changes reported     Sign knee pain         OBJECTIVE    Modalities: CP 10 min to Right knee :  Rationale:      decreease pain to improve the patients ability to perform ADLs/ iADLs/ gait     20 min Therapeutic Exercise:  [x]  See flow sheet   Rationale:      increase ROM and increase strength to improve the patients ability to perform ADLs/ iADLs/ gait      20 min Neuromuscular Re-ed: [x]  See flow sheet   Rationale:      increase strength, improve coordination, improve balance, and increase proprioception to improve the patients ability to perform ADLs/ iADLs       15 min Gait Training:  __150_ feet with _rollator__ device on level surfaces with _CGA/ min assist to guide rollator__ level of assistance   Rationale: To improve ambulation safety and efficiency in order to improve patient's ability to safely ambulate at home for self care.           Billed With/As:   [] TE   [] TA   [] Neuro   [] Self Care Patient Education: [x] Review HEP    [] Progressed/Changed HEP based on:   [] positioning   [] body mechanics   [] transfers   [] heat/ice application    [] other:        Other Objective/Functional Measures:    Limited distance with amb due to knee pain    Inconsistent level of independence with sit to stand    Pt with difficulty managing rollator, requiring occ assist to steer, zo during change of directions. Discussed with p/ wife that RW is better option primarily for safety. Pt/ wife to purchase gliders for rear     Post Treatment Pain Level (on 0 to 10) scale:   8  / 10     ASSESSMENT    Assessment/Changes in Function:     Diminished gait due to pain/ limited tolerance for standing activity    Pt/ wife to seek medical consult for knee pain     []  See Progress Note/Recertification   Patient will continue to benefit from skilled PT services to modify and progress therapeutic interventions, address functional mobility deficits, address ROM deficits, address strength deficits, address imbalance/dizziness, and instruct in home and community integration to attain remaining goals.       Progress toward goals / Updated goals:    Limited progress towards LTGs this visit       []  See Progress Note/Recertification    PLAN    [x]  Upgrade activities as tolerated {YES) Continue plan of care   []  Discharge due to :    []  Other:      Therapist: Roselle Burkitt, PT    Date: 9/20/2022 Time: 10:54 AM     Future Appointments   Date Time Provider Ivette Funez   9/20/2022 11:00 AM Loki Odom PT [facility]   9/22/2022 10:00 AM Loki Odom PT [facility]   9/27/2022 10:45 AM Cheri Aguilera PTA [facility]   9/30/2022 10:30 AM Cheri Aguilera PTA [facility]   10/4/2022 10:45 AM Cheri Aguilera PTA [facility]   10/7/2022 10:50 AM Cheri Aguilera PTA [facility]   11/3/2022 11:00 AM Akira Velasquez MD [facility] BS AMB   12/2/2022  1:30 PM Gilberot Clements MD FP BS AMB

## 2022-09-22 ENCOUNTER — HOSPITAL ENCOUNTER (OUTPATIENT)
Dept: PHYSICAL THERAPY | Age: 67
Discharge: HOME | End: 2022-09-22
Payer: MEDICARE

## 2022-09-22 PROCEDURE — 97116 GAIT TRAINING THERAPY: CPT

## 2022-09-22 PROCEDURE — 97112 NEUROMUSCULAR REEDUCATION: CPT

## 2022-09-22 PROCEDURE — 97110 THERAPEUTIC EXERCISES: CPT

## 2022-09-22 NOTE — PROGRESS NOTES
PHYSICAL THERAPY - DAILY TREATMENT NOTE     Patient Name: Carina Mcbride        Date: 2022  : 1955   YES Patient  Verified  Visit #:   28   of   40  Insurance: Payor: Margot Cotto / Plan: [employer] / Product Type: Managed Care Medicare /      In time: 205 Out time: 1055   Total Treatment Time: 50     Medicare/BCBS Time Tracking (below)   Total Timed Codes (min):  50 1:1 Treatment Time:  40     TREATMENT AREA =  Right leg weakness [R29.898]    SUBJECTIVE    Pain Level (on 0 to 10 scale): \"Knee is sore\"  / 10   Medication Changes/New allergies or changes in medical history, any new surgeries or procedures? NO    If yes, update Summary List   Subjective Functional Status/Changes:  []  No changes reported     Margie notices I'm standing longer         OBJECTIVE      20/10 min Therapeutic Exercise:  [x]  See flow sheet   Rationale:      increase ROM and increase strength to improve the patients ability to perform ADLs/ iADLs/ gait      20 min Neuromuscular Re-ed: [x]  See flow sheet   Rationale:      increase strength, improve coordination, improve balance, and increase proprioception to improve the patients ability to perform ADLs/ iADLs       10 min Gait Training:  __150_ feet with _RW_ device on level surfaces with _supervision on level surfacw   Rationale: To improve ambulation safety and efficiency in order to improve patient's ability to safely ambulate at home for self care. Billed With/As:   [] TE   [] TA   [] Neuro   [] Self Care Patient Education: [x] Review HEP    [] Progressed/Changed HEP based on:   [] positioning   [] body mechanics   [] transfers   [] heat/ice application    [] other:        Other Objective/Functional Measures:    Cont difficulty with change of direction during gait. Noted poor planning and some impulsiveness.     Cont with inconsistent ability to advance L LE during gait   Post Treatment Pain Level (on 0 to 10) scale: -  / 10     ASSESSMENT    Assessment/Changes in Function:     Able to transition sit to stand (from high surface) 8/8 times indep    Improved safety using RW for amb     []  See Progress Note/Recertification   Patient will continue to benefit from skilled PT services to modify and progress therapeutic interventions, address functional mobility deficits, address ROM deficits, address strength deficits, address imbalance/dizziness, and instruct in home and community integration to attain remaining goals.       Progress toward goals / Updated goals:    Improved safety, better ability to navigate using RW vs rollator       []  See Progress Note/Recertification    PLAN    [x]  Upgrade activities as tolerated {YES) Continue plan of care   []  Discharge due to :    []  Other:      Therapist: Florentino Post, PT    Date: 9/22/2022 Time: 10:07 AM     Future Appointments   Date Time Provider Ivette Funez   9/27/2022 10:45 AM Navi Anguiano PTA [facility]   9/30/2022 10:30 AM Navi Anguiano PTA [facility]   10/4/2022 10:45 AM Navi Anguiano PTA [facility]   10/7/2022 10:50 AM Navi Anguiano PTA [facility]   11/3/2022 11:00 AM Varun Nance MD [facility] BS AMB   12/2/2022  1:30 PM Daria Dye MD FP BS AMB

## 2022-09-27 ENCOUNTER — HOSPITAL ENCOUNTER (OUTPATIENT)
Dept: PHYSICAL THERAPY | Age: 67
Discharge: HOME | End: 2022-09-27
Payer: MEDICARE

## 2022-09-27 PROCEDURE — 97110 THERAPEUTIC EXERCISES: CPT

## 2022-09-27 PROCEDURE — 97116 GAIT TRAINING THERAPY: CPT

## 2022-09-27 PROCEDURE — 97112 NEUROMUSCULAR REEDUCATION: CPT

## 2022-09-27 NOTE — PROGRESS NOTES
PHYSICAL THERAPY - DAILY TREATMENT NOTE     Patient Name: Raquel Chong        Date: 2022  : 1955   YES Patient  Verified  Visit #:   39   of   40  Insurance: Payor: [employer] MEDICARE / Plan: [address] / Product Type: Managed Care Medicare /      In time: 11:08 AM Out time: 11:50   Total Treatment Time: 42     Medicare/BCBS Time Tracking (below)   Total Timed Codes (min):  42 1:1 Treatment Time:  39     TREATMENT AREA =  Right leg weakness [R29.898]    SUBJECTIVE    Pain Level (on 0 to 10 scale):  5  / 10- right knee   Medication Changes/New allergies or changes in medical history, any new surgeries or procedures? NO    If yes, update Summary List   Subjective Functional Status/Changes:  []  No changes reported     Pt reports he got new guards for his RW. He was able to walk into [facility] and to a couple different rooms for various activities for the first          OBJECTIVE  Modalities Rationale:   n/a    19 min Therapeutic Exercise:  [x]  See flow sheet   Rationale:      increase ROM, increase strength, and increase proprioception to improve the patients ability to perform ADLs/ iADLs/ gait        13 min Neuromuscular Re-ed: [x]  See flow sheet   Rationale:      increase ROM, increase strength, improve coordination, and increase proprioception to improve the patients ability to perform ADLs/ iADLs/ gait             10 min Gait Training:  __50_ feet ; 75 ft with _RW_ device on level surfaces with _supervision on level surfacw   Rationale: To improve ambulation safety and efficiency in order to improve patient's ability to safely ambulate at home for self care.         Billed With/As:   [] TE   [] TA   [] Neuro   [] Self Care Patient Education: [x] Review HEP    [] Progressed/Changed HEP based on:   [] positioning   [] body mechanics   [] transfers   [] heat/ice application    [] other:        Other Objective/Functional Measures:    Decreased reps in sit to stand secondary from fatigue     Post Treatment Pain Level (on 0 to 10) scale:   5  / 10     ASSESSMENT    Assessment/Changes in Function:     Pt demonstrating mild fatigue with exercise; discussed discharge planning and HEP,   Improved safety in use of RW and new rail guards, difficulty advanced LLE in gait     []  See Progress Note/Recertification   Patient will continue to benefit from skilled PT services to modify and progress therapeutic interventions, address functional mobility deficits, address ROM deficits, address strength deficits, analyze and address soft tissue restrictions, analyze and cue movement patterns, analyze and modify body mechanics/ergonomics, address imbalance/dizziness, and instruct in home and community integration to attain remaining goals.       Progress toward goals / Updated goals:    Slow Progress to    [] STG    [x] LTG  2 - 3 as shown by discussed D/C HEP , consistency in HEP, safety in static standing balance for home       PLAN    [x]  Upgrade activities as tolerated YES Continue plan of care   []  Discharge due to :    []  Other:      Therapist: Dar Tee PTA    Date: 9/27/2022 Time: 11:50 AM     Future Appointments   Date Time Provider Ivette Funez   9/27/2022 10:45 AM Rose Bowser PTA [facility]   9/30/2022 10:30 AM Rose Bowser PTA [facility]   10/4/2022 10:45 AM Rose Bowser PTA [facility]   10/7/2022 10:50 AM Rose Bowser PTA [facility]   11/3/2022 11:00 AM Von Ly MD [facility] BS AMB   12/2/2022  1:30 PM Angel Luis Pedroza MD [facility] BS AMB

## 2022-09-30 ENCOUNTER — HOSPITAL ENCOUNTER (OUTPATIENT)
Dept: PHYSICAL THERAPY | Age: 67
Discharge: HOME | End: 2022-09-30
Payer: MEDICARE

## 2022-09-30 PROCEDURE — 97116 GAIT TRAINING THERAPY: CPT

## 2022-09-30 PROCEDURE — 97112 NEUROMUSCULAR REEDUCATION: CPT

## 2022-09-30 PROCEDURE — 97110 THERAPEUTIC EXERCISES: CPT

## 2022-09-30 NOTE — PROGRESS NOTES
PHYSICAL THERAPY - DAILY TREATMENT NOTE     Patient Name: Nerissa Arora        Date: 2022  : 1955   YES Patient  Verified  Visit #:   40   of   40  Insurance: Payor: [employer] MEDICARE / Plan: [address] / Product Type: Managed Care Medicare /      In time: 10:29 am Out time: 11:30 am   Total Treatment Time: 51     Medicare/BCBS Time Tracking (below)   Total Timed Codes (min):  51 1:1 Treatment Time:  51     TREATMENT AREA =  Right leg weakness [R29.898]    SUBJECTIVE    Pain Level (on 0 to 10 scale):  4  / 10   Medication Changes/New allergies or changes in medical history, any new surgeries or procedures? NO    If yes, update Summary List   Subjective Functional Status/Changes:  []  No changes reported   Pt reports feeling a little tired today; R knee is sore       OBJECTIVE  Modalities Rationale: n/a        23 min Therapeutic Exercise:  [x]  See flow sheet   Rationale:      increase ROM, increase strength, improve coordination, improve balance, and increase proprioception to improve the patients ability to perform self care         18 min Neuromuscular Re-ed: [x]  See flow sheet   Rationale:      increase ROM, increase strength, improve coordination, improve balance, and increase proprioception to improve the patients ability to decrease fall risk         10 min Gait Training: Gait Training:  __150_ feet with _RW_ device on level surfaces with _supervision on level surfaces   Rationale: To improve ambulation safety and efficiency in order to improve patient's ability to safely ambulate at home for self care. Billed With/As:   [] TE   [] TA   [] Neuro   [] Self Care Patient Education: [x] Review HEP    [] Progressed/Changed HEP based on:   [] positioning   [] body mechanics   [] transfers   [] heat/ice application    [] other:        Other Objective/Functional Measures:     Add 4# to LAQ     Post Treatment Pain Level (on 0 to 10) scale:     / 10 ASSESSMENT    Assessment/Changes in Function:     Pt SBA sit to stand from 23 inch height; VCs to emphasize hip shift improving eccentric control from standing to sitting     []  See Progress Note/Recertification   Patient will continue to benefit from skilled PT services to modify and progress therapeutic interventions, address functional mobility deficits, address ROM deficits, address strength deficits, analyze and address soft tissue restrictions, analyze and cue movement patterns, analyze and modify body mechanics/ergonomics, assess and modify postural abnormalities, and instruct in home and community integration to attain remaining goals.       Progress toward goals / Updated goals:    Good Progress to    [] STG    [x] LTG  1-3 Emphasized consistency in HEP in preparation for D/C to HEP       PLAN    [x]  Upgrade activities as tolerated YES Continue plan of care   []  Discharge due to :    []  Other:      Therapist: Sandy Gilbert PTA    Date: 9/30/2022 Time: 11:30 AM     Future Appointments   Date Time Provider Ivette Funez   9/30/2022 10:30 AM Jorge Gabriel PTA [facility]   10/4/2022 10:45 AM Jorge Gabriel PTA [facility]   10/7/2022 10:50 AM Jorge Gabriel PTA [facility]   11/3/2022 11:00 AM Torres Christianson MD [facility] BS AMB   12/2/2022  1:30 PM Josue Egan MD [facility] BS AMB

## 2022-10-04 ENCOUNTER — HOSPITAL ENCOUNTER (OUTPATIENT)
Dept: PHYSICAL THERAPY | Age: 67
Discharge: HOME | End: 2022-10-04
Payer: MEDICARE

## 2022-10-04 PROCEDURE — 97110 THERAPEUTIC EXERCISES: CPT

## 2022-10-04 PROCEDURE — 97112 NEUROMUSCULAR REEDUCATION: CPT

## 2022-10-04 PROCEDURE — 97116 GAIT TRAINING THERAPY: CPT

## 2022-10-04 NOTE — PROGRESS NOTES
PHYSICAL THERAPY - DAILY TREATMENT NOTE     Patient Name: Denia Santoro        Date: 10/4/2022  : 1955   YES Patient  Verified  Visit #:   45   of   40  Insurance: Payor: Amilcar Manriquez / Plan: [employer] / Product Type: Managed Care Medicare /      In time: 10:54 am Out time: 11:50   Total Treatment Time: 56     Medicare/BCBS Time Tracking (below)   Total Timed Codes (min):  56 1:1 Treatment Time:  39     TREATMENT AREA =  Right leg weakness [R29.898]    SUBJECTIVE    Pain Level (on 0 to 10 scale):  5  / 10-R knee   Medication Changes/New allergies or changes in medical history, any new surgeries or procedures? NO    If yes, update Summary List   Subjective Functional Status/Changes:  []  No changes reported     Pt and wife reporting pt was able to walk with RW in [place] and walked up a ramp         OBJECTIVE  Modalities Rationale:  n/a       13 min Therapeutic Exercise:  [x]  See flow sheet   Rationale:      increase ROM, increase strength, improve coordination, improve balance, and increase proprioception to improve the patients ability to perform self care     10 min Therapeutic Activity: [x]  See flow sheet   Rationale:      increase ROM, increase strength, improve coordination, improve balance, and increase proprioception to improve the patients ability to perform self care     25 min Neuromuscular Re-ed: [x]  See flow sheet   Rationale:      increase ROM, increase strength, improve coordination, improve balance, and increase proprioception to improve the patients ability to perform self care           12 min Gait Trainin___ feet with __RW_ device on level surfaces with _SBA__ level of assistance   Rationale: To improve ambulation safety and efficiency in order to improve patient's ability to safely ambulate at home for self care.     Billed With/As:   [] TE   [] TA   [] Neuro   [] Self Care Patient Education: [x] Review HEP    [] Progressed/Changed HEP based on:   [] positioning   [] body mechanics   [] transfers   [] heat/ice application    [] other:        Other Objective/Functional Measures:    Advanced static standing x 5 min  Advanced gait with RW     Post Treatment Pain Level (on 0 to 10) scale:   5  / 10     ASSESSMENT    Assessment/Changes in Function:     Intermittent control in sit to stand for eccentric lowering during sit to stand; discussed discharge planning and self progression of HEP as tolerated. []  See Progress Note/Recertification   Patient will continue to benefit from skilled PT services to modify and progress therapeutic interventions, address functional mobility deficits, address ROM deficits, address strength deficits, analyze and cue movement patterns, analyze and modify body mechanics/ergonomics, assess and modify postural abnormalities, address imbalance/dizziness, and instruct in home and community integration to attain remaining goals.       Progress toward goals / Updated goals:  Reassess next visit for D/C to HEP       PLAN    [x]  Upgrade activities as tolerated YES Continue plan of care   []  Discharge due to :    []  Other:      Therapist: Bethany Trivedi PTA    Date: 10/4/2022 Time: 11:50 AM     Future Appointments   Date Time Provider Ivette Funez   10/7/2022 10:50 AM Maryjane Landeros PTA [facility]   11/3/2022 11:00 AM Bridgette Shannon MD [facility] BS AMB   12/2/2022  1:30 PM Tiara Santos MD [facility] BS AMB

## 2022-10-07 ENCOUNTER — HOSPITAL ENCOUNTER (OUTPATIENT)
Dept: PHYSICAL THERAPY | Age: 67
Discharge: HOME | End: 2022-10-07
Payer: MEDICARE

## 2022-10-07 PROCEDURE — 97112 NEUROMUSCULAR REEDUCATION: CPT

## 2022-10-07 PROCEDURE — 97110 THERAPEUTIC EXERCISES: CPT

## 2022-10-07 NOTE — PROGRESS NOTES
PHYSICAL THERAPY - DAILY TREATMENT NOTE     Patient Name: Manasa Augustin        Date: 10/7/2022  : 1955   YES Patient  Verified  Visit #:   44   of   40  Insurance: Payor: [employer] MEDICARE / Plan: [address] / Product Type: Managed Care Medicare /      In time: 11:30 am Out time: 12:15 pm   Total Treatment Time: 45     Medicare/BCBS Time Tracking (below)   Total Timed Codes (min):  45 1:1 Treatment Time:  25     TREATMENT AREA =  Right leg weakness [R29.898]    SUBJECTIVE    Pain Level (on 0 to 10 scale):  5  / 10   Medication Changes/New allergies or changes in medical history, any new surgeries or procedures? NO    If yes, update Summary List   Subjective Functional Status/Changes:  []  No changes reported     Pt reports ~80% improvement  No assist with transfers         OBJECTIVE  Modalities Rationale:   n/a  8 min Therapeutic Exercise:  [x]  See flow sheet   Rationale:      increase ROM, increase strength, improve coordination, and increase proprioception to improve the patients ability to decrease fall risk         25 min Neuromuscular Re-ed: [x]  See flow sheet   Rationale:      increase ROM, increase strength, improve coordination, and increase proprioception to improve the patients ability to decrease fall risk   12 min Gait Trainin___ feet with __RW_ device on level surfaces with _SBA__ level of assistance   Rationale: To improve ambulation safety and efficiency in order to improve patient's ability to safely ambulate at home for self care.     Billed With/As:   [] TE   [] TA   [] Neuro   [] Self Care Patient Education: [x] Review HEP    [] Progressed/Changed HEP based on:   [] positioning   [] body mechanics   [] transfers   [] heat/ice application    [] other:        Other Objective/Functional Measures:    Tinetti-Balance:   FOTO: 43  Review D/C instructions     Post Treatment Pain Level (on 0 to 10) scale:   5  / 10 ASSESSMENT    Assessment/Changes in Function:     See discharge     [x]  See Progress Note/Recertification   Patient will continue to benefit from skilled PT services to modify and progress therapeutic interventions, address functional mobility deficits, address ROM deficits, address strength deficits, analyze and address soft tissue restrictions, analyze and cue movement patterns, analyze and modify body mechanics/ergonomics, assess and modify postural abnormalities, address imbalance/dizziness, and instruct in home and community integration to attain remaining goals.       Progress toward goals / Updated goals:    See discharge       PLAN    [x]  Upgrade activities as tolerated YES Continue plan of care   []  Discharge due to :    []  Other:      Therapist: Carolyn Paul PTA    Date: 10/7/2022 Time: 12:15 pm     Future Appointments   Date Time Provider Ivette Funez   10/11/2022  9:25 AM Mercy Edmond PTA [facility]   11/3/2022 11:00 AM Lashonda Whaley MD [facility] BS AMB   12/2/2022  1:30 PM Viky Armijo MD [facility] BS AMB

## 2022-10-07 NOTE — PROGRESS NOTES
[address] [facility] PHYSICAL THERAPY AT [facility] [address] [place], [address]  Phone: [phone]  Fax: [phone] SUMMARY FOR PHYSICAL THERAPY          Patient Name: Evert Cruz : 1955   Treatment/Medical Diagnosis: Right leg weakness [R29.898]   Onset Date: 2022    Referral Source: Janelle Martinez MD [facility] of [facility]): 22   Prior Hospitalization: See Medical History Provider #: [id number]   Prior Level of Function: Gait dysfunction and R hemiparesis since 2021. Independent with ADLs/ iADLs prior   Comorbidities: Aneurysm 2021 with residual L hemiparesis, arthritis, LBP, HTN, visual impairment   Medications: Verified on Patient Summary List   Visits from [facility]: 39 Missed Visits: 0       Goal/Measure of Progress Goal Met? 1. Patient will report at least 50% reduction of symptoms with ADLs. Status at last Eval: 75% Current Status: Pt reports ~80% improvement   yes   2. Patient will be independent with self progression of HEP and demonstrate willingness to continue HEP after D/C to maximize/maintain gains in functional mobility. Status at last Eval: indep with current HEP Current Status: Pt instructed and independent in D/C HEP. yes   3. Tinetti's will improve to greater than or equal to 17/28 points to demonstrate significant reduction of imbalance with ADLs. Status at last Eval: 10/28 Current Status: Tinetti:  indicating high risk of fall no   4.  4. Patient to be independent with all transfers  5. Patient to ambulate independently >= 300 feet with least restrictive assistive device   Status at last Eval: supervision to transfer sit to stand from raised surface(not fully consistent, but improved); bed transfers deferred due to LBP Current Status: No assist with transfers from slightly elevated surfaces and SBA occ CGA for sit to stand standard height chairs(not fully consistent).    Pt amb > 300 ft with RW and SBA Partially met     Key Functional Changes/Progress:   Patient states that he is better able to ambulate through the house independently. He has increased his social activities. Patient reports he has returned to attending [employer]  and is able to walk longer distances with RW  in the halls to activities. He continues to demonstrate improving ability to transition sit to stand (from slightly raised surfaces  more consistently and independently. He tolerates sustained standing without UE support for ~ 5 minutes. Assessments/Recommendations: Discontinue therapy. Progressing towards or have reached established goals. Pt has met max benefit from PT at this time and is independent in his D/C HEP. If you have any questions/comments please contact us directly at [phone]. Thank you for allowing us to assist in the care of your patient.     Therapist Signature: Casandra Shah PTA Date: 10/07/2022   Reporting Period: 09/09/2022 to 10/07/2022 Time: 11:16 AM

## 2022-10-11 ENCOUNTER — HOSPITAL ENCOUNTER (OUTPATIENT)
Dept: PHYSICAL THERAPY | Age: 67
End: 2022-10-11
Payer: MEDICARE

## 2022-10-11 ENCOUNTER — APPOINTMENT (OUTPATIENT)
Dept: PHYSICAL THERAPY | Age: 67
End: 2022-10-11
Payer: MEDICARE

## 2022-10-12 ENCOUNTER — APPOINTMENT (OUTPATIENT)
Dept: PHYSICAL THERAPY | Age: 67
End: 2022-10-12
Payer: MEDICARE

## 2022-10-14 RX ORDER — HYDRALAZINE HYDROCHLORIDE 25 MG/1
25 TABLET, FILM COATED ORAL 3 TIMES DAILY
Qty: 270 TABLET | Refills: 1 | Status: SHIPPED | OUTPATIENT
Start: 2022-10-14

## 2022-10-14 NOTE — TELEPHONE ENCOUNTER
Requested Prescriptions     Pending Prescriptions Disp Refills    hydrALAZINE (APRESOLINE) 25 mg tablet 270 Tablet 1     Sig: Take 1 Tablet by mouth three (3) times daily.

## 2022-10-31 DIAGNOSIS — I42.9 CARDIOMYOPATHY, UNSPECIFIED TYPE (HCC): ICD-10-CM

## 2022-10-31 RX ORDER — EMPAGLIFLOZIN 10 MG/1
TABLET, FILM COATED ORAL
Qty: 30 TABLET | Refills: 6 | Status: SHIPPED | OUTPATIENT
Start: 2022-10-31

## 2022-11-02 ENCOUNTER — TELEPHONE (OUTPATIENT)
Dept: PHARMACY | Age: 67
End: 2022-11-02

## 2022-11-02 NOTE — TELEPHONE ENCOUNTER
Patient returned call in regards to VM left. Stated that he was getting samples from MD due to cost of medication. Did suggest to him discussing challenges in regards to paying for medication with MD to see if there is an alternative to medication prescribed. Patient said that he may give the MD a call to discuss, but does not need a fill at this time.

## 2022-11-02 NOTE — TELEPHONE ENCOUNTER
[facility] CLINICAL PHARMACY: ADHERENCE REVIEW  Identified care gap per [employer] fills at Patient Communicator : Diabetes and Statin adherence    Last Visit: 8/2/22    LIS - 0    ASSESSMENT  DIABETES ADHERENCE    Insurance Records claims through 11/2/22 (YTD Erich Preston =  75%; Potential Fail Date: 11/6/22 ):   Jardiance 10 mg last filled on 9/26/22 for 30 day supply. Next refill due: 10/26/22    Per patient EMR:   Jardiance 10 mg last sent in on 10/31/22 for 30 day supply + 6 refills. Per Oscar Meek [address]:   Collmitzi Amada 10 mg last picked up on 9/26/22 for 30 day supply. Billed through [facility] [employer] [facility] script currently filled    A1c: 5.4% on 4/23/22  NOTE A1c <9%    [address] Records claims through 11/2/22 (YTD Erich Andersonra =  100%; Passed in 2022): Atorvastatin 40 mg last filled on 9/26/22 for 90 day supply. Next refill due: 12/25/22    Lab Results   Component Value Date/Time    Cholesterol, total 128 06/09/2022 09:46 AM    HDL Cholesterol 45 06/09/2022 09:46 AM    LDL, calculated 62 06/09/2022 09:46 AM    VLDL, calculated 21 06/09/2022 09:46 AM    Triglyceride 117 06/09/2022 09:46 AM     ALT (SGPT)   Date Value Ref Range Status   06/09/2022 12 0 - 44 IU/L Final     AST (SGOT)   Date Value Ref Range Status   06/09/2022 14 0 - 40 IU/L Final     The ASCVD Risk score (Lizy DK, et al., 2019) failed to calculate for the following reasons: The patient has a prior MI or stroke diagnosis     PLAN  The following are interventions that have been identified:  - Patient overdue refilling Jardiance 10 mg and active on home medication list   - Contacted the patients pharmacy and the jardiance was already filled and ready for the patient to pick-up. - Tried to contact the patient but, got their voicemail.  Left a HIPPA compliant message for the patient letting them know they had a script ready at their pharmacy      Future Appointments   Date Time Provider Ivette Funez   11/3/2022 11:00 AM Jazmin Knowles MD [facility] BS AMB   12/2/2022  1:30 PM Neel Magdaleno MD [facility] BS AMB       Micah Martinez, PharmD  [facility] PGY1 Resident   [address] [facility], toll free: [phone]

## 2022-11-03 ENCOUNTER — OFFICE VISIT (OUTPATIENT)
Dept: CARDIOLOGY CLINIC | Age: 67
End: 2022-11-03
Payer: MEDICARE

## 2022-11-03 VITALS
WEIGHT: 212 LBS | HEART RATE: 87 BPM | BODY MASS INDEX: 27.21 KG/M2 | OXYGEN SATURATION: 95 % | SYSTOLIC BLOOD PRESSURE: 132 MMHG | HEIGHT: 74 IN | DIASTOLIC BLOOD PRESSURE: 87 MMHG

## 2022-11-03 DIAGNOSIS — I10 PRIMARY HYPERTENSION: ICD-10-CM

## 2022-11-03 DIAGNOSIS — Z86.73 HISTORY OF CVA (CEREBROVASCULAR ACCIDENT): ICD-10-CM

## 2022-11-03 DIAGNOSIS — Z98.2 S/P VP SHUNT: ICD-10-CM

## 2022-11-03 DIAGNOSIS — I35.1 NONRHEUMATIC AORTIC VALVE INSUFFICIENCY: ICD-10-CM

## 2022-11-03 DIAGNOSIS — I50.22 CHRONIC SYSTOLIC CONGESTIVE HEART FAILURE (HCC): Primary | ICD-10-CM

## 2022-11-03 DIAGNOSIS — I77.810 AORTIC ECTASIA, THORACIC (HCC): ICD-10-CM

## 2022-11-03 DIAGNOSIS — E78.00 HYPERCHOLESTEREMIA: ICD-10-CM

## 2022-11-03 PROCEDURE — 99214 OFFICE O/P EST MOD 30 MIN: CPT | Performed by: INTERNAL MEDICINE

## 2022-11-03 PROCEDURE — G8417 CALC BMI ABV UP PARAM F/U: HCPCS | Performed by: INTERNAL MEDICINE

## 2022-11-03 PROCEDURE — G8752 SYS BP LESS 140: HCPCS | Performed by: INTERNAL MEDICINE

## 2022-11-03 PROCEDURE — 3074F SYST BP LT 130 MM HG: CPT | Performed by: INTERNAL MEDICINE

## 2022-11-03 PROCEDURE — 1123F ACP DISCUSS/DSCN MKR DOCD: CPT | Performed by: INTERNAL MEDICINE

## 2022-11-03 PROCEDURE — 3078F DIAST BP <80 MM HG: CPT | Performed by: INTERNAL MEDICINE

## 2022-11-03 PROCEDURE — G8536 NO DOC ELDER MAL SCRN: HCPCS | Performed by: INTERNAL MEDICINE

## 2022-11-03 PROCEDURE — 3017F COLORECTAL CA SCREEN DOC REV: CPT | Performed by: INTERNAL MEDICINE

## 2022-11-03 PROCEDURE — 1101F PT FALLS ASSESS-DOCD LE1/YR: CPT | Performed by: INTERNAL MEDICINE

## 2022-11-03 PROCEDURE — G8432 DEP SCR NOT DOC, RNG: HCPCS | Performed by: INTERNAL MEDICINE

## 2022-11-03 PROCEDURE — G8427 DOCREV CUR MEDS BY ELIG CLIN: HCPCS | Performed by: INTERNAL MEDICINE

## 2022-11-03 PROCEDURE — G8754 DIAS BP LESS 90: HCPCS | Performed by: INTERNAL MEDICINE

## 2022-11-03 RX ORDER — SACUBITRIL AND VALSARTAN 97; 103 MG/1; MG/1
1 TABLET, FILM COATED ORAL 2 TIMES DAILY
Qty: 180 TABLET | Refills: 1 | Status: SHIPPED | OUTPATIENT
Start: 2022-11-03

## 2022-11-03 RX ORDER — SODIUM BICARBONATE 650 MG/1
TABLET ORAL 2 TIMES DAILY
COMMUNITY

## 2022-11-03 NOTE — TELEPHONE ENCOUNTER
Called to patient back to discuss cost of his medications. Left a HIPPA compliant voicemail asking for a return call. Will send a letter to offer follow-up and discuss patient assistance programs and/or the possibility of switching to a more cost effective medication.      Alyssa Brewster, PharmD  [facility] PGY1 Resident  [address]   [facility], toll free: [phone]    For Pharmacy [address] [facility] in place: No  Gap Closed?: No  Time Spent (min): 15

## 2022-11-03 NOTE — TELEPHONE ENCOUNTER
Called to patient back to discuss cost of his medications. Left a HIPPA compliant voicemail asking for a return call. Plan to discuss patient assistance programs and/or the possibility of switching to a more cost effective medication.      Tex Ramos, PharmD  [facility] [address] PGY1 Resident  [address] [facility], toll free: [phone]

## 2022-11-03 NOTE — PROGRESS NOTES
1. Have you been to the ER, urgent care clinic since your last visit? Hospitalized since your last visit?     no  2. Have you seen or consulted any other health care providers outside of the [address] [place] [address] since your last visit? Include any pap smears or colon screening. Yes Where: kidney dr      3. Since your last visit, have you had any of the following symptoms? no         4. Have you had any blood work, X-rays or cardiac testing? No         5. Where do you normally have your labs drawn? 6. Do you need any refills today?    yes

## 2022-11-03 NOTE — PATIENT INSTRUCTIONS
Medications Discontinued During This Encounter   Medication Reason    Comp Stocking,Knee,Regular,Sml misc Not A Current Medication    sacubitriL-valsartan (Entresto) 49-51 mg tab tablet DOSE ADJUSTMENT         Learning About the Mediterranean Diet  What is the Mediterranean diet? The Mediterranean diet is a style of eating rather than a diet plan. It features foods eaten in [place], [place], [place] and [place], and other countries along the [facility]. It emphasizes eating foods like fish, fruits, vegetables, beans, high-fiber breads and whole grains, nuts, and olive oil. This style of eating includes limited red meat, cheese, and sweets. Why choose the Mediterranean diet? A Mediterranean-style diet may improve heart health. It contains more fat than other heart-healthy diets. But the fats are mainly from nuts, unsaturated oils (such as fish oils and olive oil), and certain nut or seed oils (such as canola, soybean, or flaxseed oil). These fats may help protect the heart and blood vessels. How can you get started on the Mediterranean diet? Here are some things you can do to switch to a more Mediterranean way of eating. What to eat  Eat a variety of fruits and vegetables each day, such as grapes, blueberries, tomatoes, broccoli, peppers, figs, olives, spinach, eggplant, beans, lentils, and chickpeas. Eat a variety of whole-grain foods each day, such as oats, brown rice, and whole wheat bread, pasta, and couscous. Eat fish at least 2 times a week. Try tuna, salmon, mackerel, lake trout, herring, or sardines. Eat moderate amounts of low-fat dairy products, such as milk, cheese, or yogurt. Eat moderate amounts of poultry and eggs. Choose healthy (unsaturated) fats, such as nuts, olive oil, and certain nut or seed oils like canola, soybean, and flaxseed. Limit unhealthy (saturated) fats, such as butter, palm oil, and coconut oil.  And limit fats found in animal products, such as meat and dairy products made with whole milk. Try to eat red meat only a few times a month in very small amounts. Limit sweets and desserts to only a few times a week. This includes sugar-sweetened drinks like soda. The Mediterranean diet may also include red wine with your meal--1 glass each day for women and up to 2 glasses a day for men. Tips for eating at home  Use herbs, spices, garlic, lemon zest, and citrus juice instead of salt to add flavor to foods. Add avocado slices to your sandwich instead of morgan. Have fish for lunch or dinner instead of red meat. Brush the fish with olive oil, and broil or grill it. Sprinkle your salad with seeds or nuts instead of cheese. Cook with olive or canola oil instead of butter or oils that are high in saturated fat. Switch from 2% milk or whole milk to 1% or fat-free milk. Dip raw vegetables in a vinaigrette dressing or hummus instead of dips made from mayonnaise or sour cream.  Have a piece of fruit for dessert instead of a piece of cake. Try baked apples, or have some dried fruit. Tips for eating out  Try broiled, grilled, baked, or poached fish instead of having it fried or breaded. Ask your  to have your meals prepared with olive oil instead of butter. Order dishes made with marinara sauce or sauces made from olive oil. Avoid sauces made from cream or mayonnaise. Choose whole-grain breads, whole wheat pasta and pizza crust, brown rice, beans, and lentils. Cut back on butter or margarine on bread. Instead, you can dip your bread in a small amount of olive oil. Ask for a side salad or grilled vegetables instead of french fries or chips. Where can you learn more? Go to http://www.[employer].com/  Enter O407 in the search box to learn more about \"Learning About the Mediterranean Diet. \"  Current as of: May 9, 2022               Content Version: 13.4  © 8418-5105 [employer], [employer].    Care instructions adapted under license by [employer] (which disclaims liability or warranty for this information). If you have questions about a medical condition or this instruction, always ask your healthcare professional. [address] any warranty or liability for your use of this information.

## 2022-11-03 NOTE — PROGRESS NOTES
HISTORY OF PRESENT ILLNESS  Cris Schmidt is a 74 y.o. male. 1/22 seen as a new patient for cardiomyopathy. Noted after subarachnoid hemorrhage admission in 9/21 at [facility].  He also had aneurysm of [place] and was stented for that. Patient denies significant chest pain, SOB, palpitations, dizziness      Follow-up  Pertinent negatives include no chest pain, no headaches and no shortness of breath. Leg Swelling  The history is provided by the Patient. This is a new problem. The current episode started more than 1 week ago (10/21). The problem occurs every several days. The problem has been rapidly improving. Pertinent negatives include no chest pain, no headaches and no shortness of breath. Nothing aggravates the symptoms. Review of Systems   Constitutional:  Negative for chills, fever, malaise/fatigue and weight loss. HENT:  Negative for nosebleeds. Eyes:  Negative for discharge. Respiratory:  Negative for cough, shortness of breath and wheezing. Cardiovascular:  Positive for leg swelling. Negative for chest pain, palpitations, orthopnea, claudication and PND. Gastrointestinal:  Negative for diarrhea, nausea and vomiting. Genitourinary:  Negative for dysuria and hematuria. Musculoskeletal:  Negative for joint pain. Skin:  Negative for rash. Neurological:  Negative for dizziness, seizures, loss of consciousness and headaches. Endo/Heme/Allergies:  Negative for polydipsia. Does not bruise/bleed easily. Psychiatric/Behavioral:  Negative for depression and substance abuse. The patient does not have insomnia. 9/21 echo   Narrative    CONCLUSIONS     * Left ventricular systolic function is moderately reduced with an ejection   fraction of 34 % by Gao's biplane. * Diastolic dysfunction is indeterminate but has characteristics of Grade   III (severe) diastolic dysfunction. * There is severely increased left ventricular wall thickness.      * Right ventricular systolic function is reduced with TAPSE measuring 1.55   cm. * There is mild diffuse thickening (sclerosis) of the aortic valve cusps. * There is mild aortic valve regurgitation. * Estimated pulmonary arterial systolic pressure is 23 mmHg. * The aortic root at the sinus of Valsalva is dilated measuring 4.0 cm with   an index of 1.78 cm/m2. * No evidence of shunt at atrial level by 2D, color flow and negative bubble   study. Comparison     * No prior study is available for comparison. 11/21 nuclear stress test  CONCLUSIONS     * No definite pathologic perfusion defects are seen. Study is negative for   definite infarct or ischemia. * The left ventricle is dilated on both stress and rest images. There is   generalized hypokinesis with a calculated LVEF=33%. * Findings are most c/w a nonischemic cardiomyopathy. 4/22 echo  CONCLUSIONS     * Left ventricular systolic function is normal with an ejection fraction of   70 % by Gao's biplane. * There is concentric left ventricular hypertrophy with a mildly thickened   septal wall and mildly thickened posterior wall. * Left ventricular diastolic function: indeterminate. * Right ventricular systolic function is reduced with TAPSE measuring 1.58   cm. * The prox ascending aorta is dilated measuring 4.2 cm with an index of 1.86   cm/m2. * Unable to estimate pulmonary arterial pressure due to inadequate tricuspid   regurgitant Doppler waveforms. * Patent foramen ovale (PFO) with trivial right to left shunting by agitated   saline. Comparison     * Previous Echo 9/29/2021: EF 34%. Severely increased Left ventricular wall   thickness. Aortic Root at the sinus of Valsalva is dilated measuring 4.0 cm   with an index of 1.78 cm/m2. Physical Exam  Constitutional:       General: He is not in acute distress. Appearance: He is well-developed. HENT:      Head: Normocephalic and atraumatic. Mouth/Throat:      Dentition: Normal dentition. Eyes:      General: No scleral icterus. Right eye: No discharge. Left eye: No discharge. Neck:      Thyroid: No thyromegaly. Vascular: No carotid bruit or JVD. Cardiovascular:      Rate and Rhythm: Normal rate and regular rhythm. Pulses: Intact distal pulses. Heart sounds: S1 normal and S2 normal. Murmur heard. Blowing midsystolic murmur is present with a grade of 2/6 at the upper right sternal border. No friction rub. No gallop. Pulmonary:      Effort: Pulmonary effort is normal.      Breath sounds: Normal breath sounds. No wheezing or rales. Abdominal:      Palpations: Abdomen is soft. There is no mass. Tenderness: There is no abdominal tenderness. Musculoskeletal:      Cervical back: Neck supple. Right lower leg: Edema (Trace) present. Left lower leg: Edema (Trace) present. Lymphadenopathy:      Cervical:      Right cervical: No superficial cervical adenopathy. Left cervical: No superficial cervical adenopathy. Skin:     General: Skin is warm and dry. Findings: No rash. Neurological:      Mental Status: He is alert and oriented to person, place, and time. Psychiatric:         Behavior: Behavior normal.         ASSESSMENT and PLAN     Latest Reference Range & Units 6/9/22 09:46   Triglyceride 0 - 149 mg/dL 117   Cholesterol, total 100 - 199 mg/dL 128   HDL Cholesterol >39 mg/dL 45   LDL, calculated 0 - 99 mg/dL 62   VLDL, calculated 5 - 40 mg/dL 21     Aortic regurgitation: Mild 9/21, none reported/22   cardiomyopathy: EF 34% 9/21; 70% 4/22  Aortic ectasia: 9/21 ao root 4.0; 4/22 ASC ao 4.2 cm;    1/5/2022 cardiomyopathy is likely nonischemic as the stress test was normal.  Blood pressure is treated well and controlled now. He does have bradycardia frequently at home and so not a good candidate for beta-blockers.   Would like to use Entresto but will need labs specially the renal function and potassium before changing lisinopril. Labs as ordered. Aortic incompetence is mild and will be followed clinically. 2/16/2022 CHF is compensated with trace edema around the ankles. Renal function is significantly improved since diuretics held. Reduce Norvasc to 2.5 a day. Change lisinopril to Entresto. Follow home BP chart and BMP. Compression stockings for edema. Add Jardiance for cardiomyopathy. Future echo in a few months but will try to titrate the medications in 6 weeks. Murmur for aortic insufficiency persists. 5/5/2022 CHF is compensated. Recent stroke status post tPA. Recovery is nearly complete to his previous baseline. Echo in the hospital at the time of stroke showed normal ejection fraction indicating recovered EF. Reduce hydralazine and let the blood pressure be slightly higher. Continue other medications  Lipitor increased to bring the LDL down further. Labs as ordered. AI not reported in the recent echo which is also good news but will follow by serial echocardiograms when they are done for aortic ectasia. Diagnoses and all orders for this visit:    1. Chronic systolic congestive heart failure (HCC)  -     sacubitriL-valsartan (Entresto)  mg tablet; Take 1 Tablet by mouth two (2) times a day. -     METABOLIC PANEL, BASIC; Future  -     ECHO ADULT COMPLETE; Future    2. Primary hypertension    3. Nonrheumatic aortic valve insufficiency    4. S/P  shunt    5. History of CVA (cerebrovascular accident)    6. Hypercholesteremia    7. Aortic ectasia, thoracic (HCC)  -     ECHO ADULT COMPLETE; Future        Pertinent laboratory and test data reviewed and discussed with patient.   See patient instructions also for other medical advice given    Medications Discontinued During This Encounter   Medication Reason    Comp Stocking,Knee,Regular,Sml misc Not A Current Medication    sacubitriL-valsartan (Entresto) 49-51 mg tab tablet DOSE ADJUSTMENT       Follow-up and Dispositions    Return in about 6 months (around 5/3/2023), or if symptoms worsen or fail to improve, for post test.       11/3/2022 CHF is compensated NYHA class II-III because of weakness due to stroke  Blood pressure is controlled better at home as he is missing Entresto for the last couple of days.   Increase Entresto and follow home BP.  AI is mild and will be followed clinically as well as by echo  Aortic ectasia we will repeat echo next year  Lipids are excellent and continue the same dose

## 2022-11-08 ENCOUNTER — TELEPHONE (OUTPATIENT)
Dept: FAMILY MEDICINE CLINIC | Age: 67
End: 2022-11-08

## 2022-11-08 NOTE — TELEPHONE ENCOUNTER
Pt called stating his wife has been summoned for jury duty on 12/2. Pt wants to know can his wife get a letter stating she is his primary caretaker and she cannot make it. Please advise.

## 2022-11-09 NOTE — TELEPHONE ENCOUNTER
Left a message on patient's mobile phone advising letter is ready for pick-up at [facility] . Closing encounter.

## 2022-11-18 LAB
BUN SERPL-MCNC: 24 MG/DL (ref 8–27)
BUN/CREAT SERPL: 18 (ref 10–24)
CALCIUM SERPL-MCNC: 9.4 MG/DL (ref 8.6–10.2)
CHLORIDE SERPL-SCNC: 105 MMOL/L (ref 96–106)
CO2 SERPL-SCNC: 20 MMOL/L (ref 20–29)
CREAT SERPL-MCNC: 1.35 MG/DL (ref 0.76–1.27)
EGFR: 58 ML/MIN/1.73
GLUCOSE SERPL-MCNC: 93 MG/DL (ref 70–99)
POTASSIUM SERPL-SCNC: 4.4 MMOL/L (ref 3.5–5.2)
SODIUM SERPL-SCNC: 143 MMOL/L (ref 134–144)

## 2022-12-01 NOTE — PROGRESS NOTES
1. \"Have you been to the ER, urgent care clinic since your last visit? Hospitalized since your last visit? \" No    2. \"Have you seen or consulted any other health care providers outside of the [address] [place] [address] since your last visit? \" No     3. For patients aged 39-70: Has the patient had a colonoscopy / FIT/ Cologuard?  No

## 2022-12-02 ENCOUNTER — OFFICE VISIT (OUTPATIENT)
Dept: FAMILY MEDICINE CLINIC | Age: 67
End: 2022-12-02
Payer: MEDICARE

## 2022-12-02 VITALS
RESPIRATION RATE: 18 BRPM | HEART RATE: 85 BPM | HEIGHT: 74 IN | SYSTOLIC BLOOD PRESSURE: 110 MMHG | DIASTOLIC BLOOD PRESSURE: 82 MMHG | WEIGHT: 209.25 LBS | TEMPERATURE: 97.9 F | BODY MASS INDEX: 26.85 KG/M2 | OXYGEN SATURATION: 97 %

## 2022-12-02 DIAGNOSIS — Z23 ENCOUNTER FOR IMMUNIZATION: ICD-10-CM

## 2022-12-02 DIAGNOSIS — I10 PRIMARY HYPERTENSION: ICD-10-CM

## 2022-12-02 DIAGNOSIS — I77.810 AORTIC ECTASIA, THORACIC (HCC): ICD-10-CM

## 2022-12-02 DIAGNOSIS — I50.22 CHRONIC SYSTOLIC CONGESTIVE HEART FAILURE (HCC): ICD-10-CM

## 2022-12-02 DIAGNOSIS — Z98.2 S/P VP SHUNT: ICD-10-CM

## 2022-12-02 DIAGNOSIS — I21.4 NON-ST ELEVATION MI (NSTEMI) (HCC): Primary | ICD-10-CM

## 2022-12-02 DIAGNOSIS — N18.30 STAGE 3 CHRONIC KIDNEY DISEASE, UNSPECIFIED WHETHER STAGE 3A OR 3B CKD (HCC): ICD-10-CM

## 2022-12-02 DIAGNOSIS — R26.89 BALANCE PROBLEM: ICD-10-CM

## 2022-12-02 DIAGNOSIS — E78.00 HYPERCHOLESTEREMIA: ICD-10-CM

## 2022-12-02 DIAGNOSIS — I35.1 NONRHEUMATIC AORTIC VALVE INSUFFICIENCY: ICD-10-CM

## 2022-12-02 DIAGNOSIS — Z74.1 REQUIRES ASSISTANCE WITH ACTIVITIES OF DAILY LIVING (ADL): ICD-10-CM

## 2022-12-02 DIAGNOSIS — I72.9 ANEURYSM (HCC): ICD-10-CM

## 2022-12-02 NOTE — PROGRESS NOTES
Chief Complaint   Patient presents with    Chronic Kidney Disease    Hypertension    Cholesterol Problem     Hx CVA    Abdominal Aortic Aneurysm     Aneurysm     Visit Vitals  /82 (BP 1 Location: Left upper arm, BP Patient Position: Sitting, BP Cuff Size: Adult)   Pulse 85   Temp 97.9 °F (36.6 °C) (Temporal)   Resp 18   Ht 6' 2\" (1.88 m)   Wt 209 lb 4 oz (94.9 kg)   SpO2 97%   BMI 26.87 kg/m²      PHQ 9 Score: 0 (12/2/2022  1:33 PM)  Thoughts of being better off dead, or hurting yourself in some way: 0 (12/2/2022  1:33 PM)     Abuse Screening Questionnaire 12/2/2022   Do you ever feel afraid of your partner? N   Are you in a relationship with someone who physically or mentally threatens you? N   Is it safe for you to go home? Y      Fall Risk Assessment, last 12 mths 12/2/2022   Able to walk? Yes   Fall in past 12 months? 1   Do you feel unsteady? 1   Are you worried about falling 1   Is the gait abnormal? 1   Number of falls in past 12 months 2   Fall with injury? 0       Physician order obtained. Patient completed adult immunization consent form. Allergies, contraindications and recommendations reviewed with patient. Seasonal influenza vaccine administered IM right deltoid. Patient tolerated well. Patient remained in office for 15 minutes after injection and no adverse reactions were noted. Girtha Jock  [id number]  [id number]       1. \"Have you been to the ER, urgent care clinic since your last visit? Hospitalized since your last visit? \" No    2. \"Have you seen or consulted any other health care providers outside of the [address] [place] [address] since your last visit? \" No     3. For patients aged 39-70: Has the patient had a colonoscopy / FIT/ Cologuard?  No

## 2022-12-03 NOTE — PROGRESS NOTES
HISTORY OF PRESENT ILLNESS  Manasa Augustin is a 74 y.o. male. HPI: Here for follow-up. Accompanied with wife. History of aneurysm. Post surgery and ideation. New to follow neurosurgeon but he is going to change the insurance and will make an appointment for new year. Sitting comfortable without any acute distress. Son concerned with the balance when he tries to do his ADL. Per wife significant improvement with the physical therapy. Denies any headache or dizziness. No nausea vomiting or abdominal pain. He has started doing ADLs on his own with minimal assistance. Done with physical therapy. Still feels need strength to walk longer distance with a walker. Does use walker as needed. Agreed to restart physical therapy to obtain the strength. Denies any headache, dizziness, no chest pain or trouble breathing, no arm or leg weakness. No nausea or vomiting, no weight or appetite changes, no mood changes . No urine or bowel complains, no palpitation, no diaphoresis. No abdominal pain. No cold or cough. No leg swelling. No fever. No sleep trouble. Vitals has been stable. History of coronary artery disease. On risk factor management. No anginal symptoms. History of chronic congestive heart failure. No signs of volume overload at this time. Visit Vitals  /82 (BP 1 Location: Left upper arm, BP Patient Position: Sitting, BP Cuff Size: Adult)   Pulse 85   Temp 97.9 °F (36.6 °C) (Temporal)   Resp 18   Ht 6' 2\" (1.88 m)   Wt 209 lb 4 oz (94.9 kg)   SpO2 97%   BMI 26.87 kg/m²     Review medication list, vitals, problem list,allergies.    Lab Results   Component Value Date/Time    WBC 6.6 06/09/2022 09:46 AM    HGB 14.6 06/09/2022 09:46 AM    HCT 44.5 06/09/2022 09:46 AM    PLATELET 544 60/31/5560 09:46 AM    MCV 87 06/09/2022 09:46 AM     Lab Results   Component Value Date/Time    Sodium 143 11/17/2022 10:11 AM    Potassium 4.4 11/17/2022 10:11 AM    Chloride 105 11/17/2022 10:11 AM    CO2 20 11/17/2022 10:11 AM    Glucose 93 11/17/2022 10:11 AM    BUN 24 11/17/2022 10:11 AM    Creatinine 1.35 (H) 11/17/2022 10:11 AM    BUN/Creatinine ratio 18 11/17/2022 10:11 AM    GFR est AA 66 02/15/2022 10:13 AM    GFR est non-AA 57 (L) 02/15/2022 10:13 AM    Calcium 9.4 11/17/2022 10:11 AM    Bilirubin, total 0.6 06/09/2022 09:46 AM    Alk. phosphatase 71 06/09/2022 09:46 AM    Protein, total 6.8 06/09/2022 09:46 AM    Albumin 4.2 06/09/2022 09:46 AM    ALT (SGPT) 12 06/09/2022 09:46 AM    AST (SGOT) 14 06/09/2022 09:46 AM     Lab Results   Component Value Date/Time    Cholesterol, total 128 06/09/2022 09:46 AM    HDL Cholesterol 45 06/09/2022 09:46 AM    LDL, calculated 62 06/09/2022 09:46 AM    VLDL, calculated 21 06/09/2022 09:46 AM    Triglyceride 117 06/09/2022 09:46 AM     Lab Results   Component Value Date/Time    TSH 3.480 01/07/2022 10:31 AM     ROS: See HPI    Physical Exam  Constitutional:       General: He is not in acute distress. Cardiovascular:      Rate and Rhythm: Normal rate and regular rhythm. Heart sounds: Normal heart sounds. Abdominal:      General: Bowel sounds are normal.      Palpations: Abdomen is soft. Tenderness: There is no abdominal tenderness. Musculoskeletal:         General: No swelling. Neurological:      General: No focal deficit present. Mental Status: He is oriented to person, place, and time. Psychiatric:         Behavior: Behavior normal.       ASSESSMENT and PLAN    ICD-10-CM ICD-9-CM    1. Non-ST elevation MI (NSTEMI) ([facility] [address]): On risk factor management. Following cardiology. Reviewed their notes. Echo has been ordered to be done in few months. No signs of volume overload or any anginal symptoms. Will follow specialist and the recommendation I21.4 410.70       2. Encounter for immunization  Z23 V03.89 INFLUENZA, FLUAD, (AGE 72 Y+), IM, PF, 0.5 ML      3. Aneurysm [facility]): Post surgery post with patient.   Advised to make a follow-up appointment with the neurosurgeon as recommended. I72.9 442.9       4. Stage 3 chronic kidney disease, unspecified whether stage 3a or 3b CKD ([facility] [address]): Avoid NSAIDs. We will observe. N18.30 585.3       5. Primary hypertension:.well controlled. Continue current dose of medication and low salt diet. Exercise as tolerated. I10 401.9       6. S/P  shunt  Z98.2 V45.2       7. Hypercholesteremia  E78.00 272.0       8. Nonrheumatic aortic valve insufficiency  I35.1 424.1       9. Balance problem: Reordering physical therapy. I encouraged him to do home exercise. Use walker to prevent fall. R26.89 781.99 REFERRAL TO PHYSICAL THERAPY      10. Requires assistance with activities of daily living (ADL): Does improve with physical therapy but still needs more strength. Sending to physical therapy Z74.1 V49.89 REFERRAL TO PHYSICAL THERAPY      11. Chronic systolic congestive heart failure [facility]): No signs of volume overload I50.22 428.22      428.0     seen cardiology. on enteresto and also ordered echo . on jardiance as well       12. Aortic ectasia, thoracic (HCC)  I77.810 447.71       Pt understood and agree with the plan     Follow-up and Dispositions    Return in about 6 months (around 6/2/2023). Please note that this dictation was completed with Copious, the computer voice recognition software. Quite often unanticipated grammatical, syntax, homophones, and other interpretive errors are inadvertently transcribed by the computer software. Please disregard these errors. Please excuse any errors that have escaped final proofreading.

## 2022-12-05 ENCOUNTER — TELEPHONE (OUTPATIENT)
Dept: PHYSICAL THERAPY | Age: 67
End: 2022-12-05

## 2022-12-13 ENCOUNTER — TELEPHONE (OUTPATIENT)
Dept: CARDIOLOGY CLINIC | Age: 67
End: 2022-12-13

## 2023-02-16 DIAGNOSIS — I50.22 CHRONIC SYSTOLIC (CONGESTIVE) HEART FAILURE (HCC): Primary | ICD-10-CM

## 2023-02-16 RX ORDER — SACUBITRIL AND VALSARTAN 97; 103 MG/1; MG/1
1 TABLET, FILM COATED ORAL 2 TIMES DAILY
Qty: 60 TABLET | Refills: 3 | Status: SHIPPED | OUTPATIENT
Start: 2023-02-16

## 2023-02-16 NOTE — TELEPHONE ENCOUNTER
Requested Prescriptions     Pending Prescriptions Disp Refills    sacubitril-valsartan (ENTRESTO)  MG per tablet 60 tablet 3     Sig: Take 1 tablet by mouth 2 times daily

## 2023-04-24 RX ORDER — CLOPIDOGREL BISULFATE 75 MG/1
75 TABLET ORAL DAILY
Qty: 90 TABLET | Refills: 3 | Status: SHIPPED | OUTPATIENT
Start: 2023-04-24

## 2023-05-05 LAB — LEFT VENTRICULAR EJECTION FRACTION, EXTERNAL: 80

## 2023-06-21 ENCOUNTER — OFFICE VISIT (OUTPATIENT)
Age: 68
End: 2023-06-21
Payer: MEDICARE

## 2023-06-21 VITALS
DIASTOLIC BLOOD PRESSURE: 77 MMHG | RESPIRATION RATE: 18 BRPM | TEMPERATURE: 98.6 F | OXYGEN SATURATION: 93 % | BODY MASS INDEX: 26.83 KG/M2 | HEIGHT: 74 IN | SYSTOLIC BLOOD PRESSURE: 124 MMHG | HEART RATE: 82 BPM

## 2023-06-21 DIAGNOSIS — Z09 HOSPITAL DISCHARGE FOLLOW-UP: Primary | ICD-10-CM

## 2023-06-21 DIAGNOSIS — I42.8 NONISCHEMIC CARDIOMYOPATHY (HCC): ICD-10-CM

## 2023-06-21 DIAGNOSIS — R55 VASOVAGAL SYNCOPE: ICD-10-CM

## 2023-06-21 DIAGNOSIS — G93.41 METABOLIC ENCEPHALOPATHY: ICD-10-CM

## 2023-06-21 PROCEDURE — 99211 OFF/OP EST MAY X REQ PHY/QHP: CPT

## 2023-06-21 PROCEDURE — 3074F SYST BP LT 130 MM HG: CPT | Performed by: NURSE PRACTITIONER

## 2023-06-21 PROCEDURE — 99214 OFFICE O/P EST MOD 30 MIN: CPT | Performed by: NURSE PRACTITIONER

## 2023-06-21 PROCEDURE — 1123F ACP DISCUSS/DSCN MKR DOCD: CPT | Performed by: NURSE PRACTITIONER

## 2023-06-21 PROCEDURE — 3078F DIAST BP <80 MM HG: CPT | Performed by: NURSE PRACTITIONER

## 2023-06-21 RX ORDER — LORATADINE 10 MG/1
10 TABLET ORAL DAILY
COMMUNITY
Start: 2023-05-11

## 2023-06-21 RX ORDER — FAMOTIDINE 20 MG/1
20 TABLET, FILM COATED ORAL DAILY PRN
COMMUNITY

## 2023-06-21 ASSESSMENT — PATIENT HEALTH QUESTIONNAIRE - PHQ9
SUM OF ALL RESPONSES TO PHQ QUESTIONS 1-9: 0
2. FEELING DOWN, DEPRESSED OR HOPELESS: 0
SUM OF ALL RESPONSES TO PHQ QUESTIONS 1-9: 0
1. LITTLE INTEREST OR PLEASURE IN DOING THINGS: 0
SUM OF ALL RESPONSES TO PHQ9 QUESTIONS 1 & 2: 0

## 2023-07-25 NOTE — TELEPHONE ENCOUNTER
Requested Prescriptions     Pending Prescriptions Disp Refills    empagliflozin (JARDIANCE) 10 MG tablet 14 tablet 0     Sig: Take 1 tablet by mouth daily TAKE ONE TABLET BY MOUTH DAILY

## 2023-07-28 DIAGNOSIS — I50.22 CHRONIC SYSTOLIC (CONGESTIVE) HEART FAILURE (HCC): Primary | ICD-10-CM

## 2023-08-04 DIAGNOSIS — I50.22 CHRONIC SYSTOLIC (CONGESTIVE) HEART FAILURE (HCC): ICD-10-CM

## 2023-08-04 RX ORDER — SACUBITRIL AND VALSARTAN 97; 103 MG/1; MG/1
1 TABLET, FILM COATED ORAL 2 TIMES DAILY
Qty: 60 TABLET | Refills: 3 | Status: SHIPPED | OUTPATIENT
Start: 2023-08-04

## 2023-09-13 RX ORDER — HYDRALAZINE HYDROCHLORIDE 25 MG/1
25 TABLET, FILM COATED ORAL 3 TIMES DAILY
Qty: 90 TABLET | Refills: 2 | Status: SHIPPED | OUTPATIENT
Start: 2023-09-13

## 2023-09-13 NOTE — TELEPHONE ENCOUNTER
Requested Prescriptions     Pending Prescriptions Disp Refills    hydrALAZINE (APRESOLINE) 25 MG tablet 90 tablet 2     Sig: Take 1 tablet by mouth 3 times daily

## 2023-09-19 ENCOUNTER — OFFICE VISIT (OUTPATIENT)
Age: 68
End: 2023-09-19
Payer: MEDICARE

## 2023-09-19 VITALS
HEART RATE: 81 BPM | OXYGEN SATURATION: 94 % | RESPIRATION RATE: 16 BRPM | TEMPERATURE: 97.6 F | DIASTOLIC BLOOD PRESSURE: 78 MMHG | SYSTOLIC BLOOD PRESSURE: 118 MMHG

## 2023-09-19 DIAGNOSIS — I35.1 NONRHEUMATIC AORTIC VALVE INSUFFICIENCY: ICD-10-CM

## 2023-09-19 DIAGNOSIS — Z13.6 SCREENING FOR AAA (ABDOMINAL AORTIC ANEURYSM): ICD-10-CM

## 2023-09-19 DIAGNOSIS — Z11.59 NEED FOR HEPATITIS C SCREENING TEST: ICD-10-CM

## 2023-09-19 DIAGNOSIS — I50.22 CHRONIC SYSTOLIC CONGESTIVE HEART FAILURE (HCC): ICD-10-CM

## 2023-09-19 DIAGNOSIS — E78.00 HYPERCHOLESTEREMIA: ICD-10-CM

## 2023-09-19 DIAGNOSIS — Z00.00 MEDICARE ANNUAL WELLNESS VISIT, SUBSEQUENT: Primary | ICD-10-CM

## 2023-09-19 DIAGNOSIS — Z12.11 SCREENING FOR COLON CANCER: ICD-10-CM

## 2023-09-19 DIAGNOSIS — I21.4 NON-ST ELEVATION MI (NSTEMI) (HCC): ICD-10-CM

## 2023-09-19 DIAGNOSIS — I77.810 AORTIC ECTASIA, THORACIC (HCC): ICD-10-CM

## 2023-09-19 DIAGNOSIS — L55.9 SUNBURN: ICD-10-CM

## 2023-09-19 DIAGNOSIS — I10 PRIMARY HYPERTENSION: ICD-10-CM

## 2023-09-19 DIAGNOSIS — Z86.73 HISTORY OF CVA (CEREBROVASCULAR ACCIDENT): ICD-10-CM

## 2023-09-19 DIAGNOSIS — I72.4 POPLITEAL ARTERY ANEURYSM, BILATERAL (HCC): ICD-10-CM

## 2023-09-19 DIAGNOSIS — Z71.89 ACP (ADVANCE CARE PLANNING): ICD-10-CM

## 2023-09-19 DIAGNOSIS — N28.9 RENAL INSUFFICIENCY: ICD-10-CM

## 2023-09-19 PROCEDURE — 90677 PCV20 VACCINE IM: CPT | Performed by: FAMILY MEDICINE

## 2023-09-19 PROCEDURE — PBSHW PNEUMOCOCCAL, PCV20, PREVNAR 20, (AGE 18 YRS+), IM, PF: Performed by: FAMILY MEDICINE

## 2023-09-19 PROCEDURE — 99497 ADVNCD CARE PLAN 30 MIN: CPT | Performed by: FAMILY MEDICINE

## 2023-09-19 PROCEDURE — 3074F SYST BP LT 130 MM HG: CPT | Performed by: FAMILY MEDICINE

## 2023-09-19 PROCEDURE — PBSHW INFLUENZA, FLUAD, (AGE 65 Y+), IM, PF, 0.5 ML: Performed by: FAMILY MEDICINE

## 2023-09-19 PROCEDURE — G0439 PPPS, SUBSEQ VISIT: HCPCS | Performed by: FAMILY MEDICINE

## 2023-09-19 PROCEDURE — G0009 ADMIN PNEUMOCOCCAL VACCINE: HCPCS | Performed by: FAMILY MEDICINE

## 2023-09-19 PROCEDURE — 90694 VACC AIIV4 NO PRSRV 0.5ML IM: CPT | Performed by: FAMILY MEDICINE

## 2023-09-19 PROCEDURE — G0008 ADMIN INFLUENZA VIRUS VAC: HCPCS | Performed by: FAMILY MEDICINE

## 2023-09-19 PROCEDURE — 3078F DIAST BP <80 MM HG: CPT | Performed by: FAMILY MEDICINE

## 2023-09-19 PROCEDURE — 99213 OFFICE O/P EST LOW 20 MIN: CPT | Performed by: FAMILY MEDICINE

## 2023-09-19 PROCEDURE — 1123F ACP DISCUSS/DSCN MKR DOCD: CPT | Performed by: FAMILY MEDICINE

## 2023-09-19 SDOH — ECONOMIC STABILITY: INCOME INSECURITY: HOW HARD IS IT FOR YOU TO PAY FOR THE VERY BASICS LIKE FOOD, HOUSING, MEDICAL CARE, AND HEATING?: SOMEWHAT HARD

## 2023-09-19 SDOH — ECONOMIC STABILITY: HOUSING INSECURITY
IN THE LAST 12 MONTHS, WAS THERE A TIME WHEN YOU DID NOT HAVE A STEADY PLACE TO SLEEP OR SLEPT IN A SHELTER (INCLUDING NOW)?: NO

## 2023-09-19 SDOH — ECONOMIC STABILITY: FOOD INSECURITY: WITHIN THE PAST 12 MONTHS, YOU WORRIED THAT YOUR FOOD WOULD RUN OUT BEFORE YOU GOT MONEY TO BUY MORE.: NEVER TRUE

## 2023-09-19 SDOH — ECONOMIC STABILITY: FOOD INSECURITY: WITHIN THE PAST 12 MONTHS, THE FOOD YOU BOUGHT JUST DIDN'T LAST AND YOU DIDN'T HAVE MONEY TO GET MORE.: NEVER TRUE

## 2023-09-19 ASSESSMENT — PATIENT HEALTH QUESTIONNAIRE - PHQ9
2. FEELING DOWN, DEPRESSED OR HOPELESS: 0
SUM OF ALL RESPONSES TO PHQ QUESTIONS 1-9: 0
1. LITTLE INTEREST OR PLEASURE IN DOING THINGS: 0
SUM OF ALL RESPONSES TO PHQ QUESTIONS 1-9: 0
SUM OF ALL RESPONSES TO PHQ9 QUESTIONS 1 & 2: 0

## 2023-09-19 ASSESSMENT — LIFESTYLE VARIABLES
HOW MANY STANDARD DRINKS CONTAINING ALCOHOL DO YOU HAVE ON A TYPICAL DAY: PATIENT DOES NOT DRINK
HOW OFTEN DO YOU HAVE A DRINK CONTAINING ALCOHOL: NEVER

## 2023-09-19 NOTE — PATIENT INSTRUCTIONS
tests and screenings are paid in full while other may be subject to a deductible, co-insurance, and/or copay. Some of these benefits include a comprehensive review of your medical history including lifestyle, illnesses that may run in your family, and various assessments and screenings as appropriate. After reviewing your medical record and screening and assessments performed today your provider may have ordered immunizations, labs, imaging, and/or referrals for you. A list of these orders (if applicable) as well as your Preventive Care list are included within your After Visit Summary for your review. Other Preventive Recommendations:    A preventive eye exam performed by an eye specialist is recommended every 1-2 years to screen for glaucoma; cataracts, macular degeneration, and other eye disorders. A preventive dental visit is recommended every 6 months. Try to get at least 150 minutes of exercise per week or 10,000 steps per day on a pedometer . Order or download the FREE \"Exercise & Physical Activity: Your Everyday Guide\" from [employer]. Call 5-226.824.4296 or search [employer] online. You need 0600-5864 mg of calcium and 3687-5803 IU of vitamin D per day. It is possible to meet your calcium requirement with diet alone, but a vitamin D supplement is usually necessary to meet this goal.  When exposed to the sun, use a sunscreen that protects against both UVA and UVB radiation with an SPF of 30 or greater. Reapply every 2 to 3 hours or after sweating, drying off with a towel, or swimming. Always wear a seat belt when traveling in a car. Always wear a helmet when riding a bicycle or motorcycle.

## 2023-09-19 NOTE — ACP (ADVANCE CARE PLANNING)
Advance Care Planning     General Advance Care Planning (ACP) Conversation    Date of Conversation: 9/19/2023  Conducted with: Patient with Decision Making Capacity    Healthcare Decision Maker:    Primary Decision Maker: Erin Gonzalez - Spouse - [phone]  Click here to complete [address] including selection of the Healthcare Decision Maker Relationship (ie \"Primary\"). Today we  discussed advance directive. Given form for advance directive to complete  . Content/Action Overview:  See above   Reviewed DNR/DNI and patient was given form to fill up for an advance directive to complete. Pt wants initial resuscitation. Wants to be a full code. Given details on advance directive info in AVS. He will return the form once it is completed. All questions answered at patient satisfactory level. treatment goals, benefit/burden of treatment options, artificial nutrition, ventilation preferences, hospitalization preferences, resuscitation preferences, end of life care preferences (vegetative state/imminent death), and hospice care      Length of Voluntary ACP Conversation in minutes:  >16 minutes.      Heidy Yeager MD

## 2023-10-23 ENCOUNTER — NEW PATIENT (OUTPATIENT)
Dept: URBAN - METROPOLITAN AREA CLINIC 1 | Facility: CLINIC | Age: 68
End: 2023-10-23

## 2023-10-23 DIAGNOSIS — I72.9: ICD-10-CM

## 2023-10-23 DIAGNOSIS — H34.211: ICD-10-CM

## 2023-10-23 DIAGNOSIS — H25.813: ICD-10-CM

## 2023-10-23 DIAGNOSIS — H49.22: ICD-10-CM

## 2023-10-23 PROCEDURE — 92004 COMPRE OPH EXAM NEW PT 1/>: CPT

## 2023-10-23 ASSESSMENT — VISUAL ACUITY
OD_CC: 20/20-1
OS_CC: 20/20-1
OD_SC: J3
OS_BAT: 20/30
OS_SC: J2
OS_SC: 20/40
OD_BAT: 20/30
OD_SC: 20/50

## 2023-10-23 ASSESSMENT — TONOMETRY
OD_IOP_MMHG: 17
OS_IOP_MMHG: 17

## 2023-10-26 ENCOUNTER — TELEPHONE (OUTPATIENT)
Age: 68
End: 2023-10-26

## 2023-10-26 NOTE — TELEPHONE ENCOUNTER
----- Message from Etha Standard sent at 10/24/2023  1:59 PM EDT -----  Subject: Referral Request    Reason for referral request? Carotid US  Provider patient wants to be referred to(if known):     Provider Phone Number(if known): Additional Information for Provider? Pt was seen by an ophthalmologist   yesterday (10/23) and was recommended to have a carotid ultrasound - he   has been having vision issues.  Please call to advise.  ---------------------------------------------------------------------------  --------------  Julio Crawley INFO    [phone]; OK to leave message on voicemail  ---------------------------------------------------------------------------  --------------

## 2023-10-27 DIAGNOSIS — Z13.6 ENCOUNTER FOR SCREENING FOR STENOSIS OF CAROTID ARTERY: Primary | ICD-10-CM

## 2023-10-27 DIAGNOSIS — H53.9 VISION CHANGES: ICD-10-CM

## 2023-10-31 NOTE — TELEPHONE ENCOUNTER
Pt called and I advised that the order had been placed for his carotid study. Pt voice understanding.  Closing encounter

## 2023-11-17 ENCOUNTER — HOSPITAL ENCOUNTER (OUTPATIENT)
Facility: HOSPITAL | Age: 68
End: 2023-11-17
Attending: FAMILY MEDICINE
Payer: MEDICARE

## 2023-11-17 DIAGNOSIS — Z13.6 ENCOUNTER FOR SCREENING FOR STENOSIS OF CAROTID ARTERY: ICD-10-CM

## 2023-11-17 DIAGNOSIS — H53.9 VISION CHANGES: ICD-10-CM

## 2023-11-17 LAB
VAS LEFT CCA DIST EDV: 18 CM/S
VAS LEFT CCA DIST PSV: 54.2 CM/S
VAS LEFT CCA MID EDV: 21.87 CM/S
VAS LEFT CCA MID PSV: 72.34 CM/S
VAS LEFT CCA PROX EDV: 27.1 CM/S
VAS LEFT CCA PROX PSV: 76.2 CM/S
VAS LEFT ECA EDV: 11.52 CM/S
VAS LEFT ECA PSV: 67.2 CM/S
VAS LEFT ICA DIST EDV: 12.1 CM/S
VAS LEFT ICA DIST PSV: 31.2 CM/S
VAS LEFT ICA MID EDV: 10.9 CM/S
VAS LEFT ICA MID PSV: 41.7 CM/S
VAS LEFT ICA PROX EDV: 15.4 CM/S
VAS LEFT ICA PROX PSV: 55.5 CM/S
VAS LEFT ICA/CCA PSV: 1.02 NO UNITS
VAS LEFT SUBCLAVIAN PROX EDV: 0 CM/S
VAS LEFT SUBCLAVIAN PROX PSV: 65.8 CM/S
VAS LEFT VERTEBRAL EDV: 9.78 CM/S
VAS LEFT VERTEBRAL PSV: 41.7 CM/S
VAS RIGHT CCA DIST EDV: 11.6 CM/S
VAS RIGHT CCA DIST PSV: 54.7 CM/S
VAS RIGHT CCA MID EDV: 22.05 CM/S
VAS RIGHT CCA MID PSV: 69 CM/S
VAS RIGHT CCA PROX EDV: 11 CM/S
VAS RIGHT CCA PROX PSV: 47.6 CM/S
VAS RIGHT ECA EDV: 13.01 CM/S
VAS RIGHT ECA PSV: 80 CM/S
VAS RIGHT ICA DIST EDV: 10 CM/S
VAS RIGHT ICA DIST PSV: 34.1 CM/S
VAS RIGHT ICA MID EDV: 12.8 CM/S
VAS RIGHT ICA MID PSV: 49 CM/S
VAS RIGHT ICA PROX EDV: 19.3 CM/S
VAS RIGHT ICA PROX PSV: 57.8 CM/S
VAS RIGHT ICA/CCA PSV: 1.1 NO UNITS
VAS RIGHT SUBCLAVIAN PROX EDV: 0 CM/S
VAS RIGHT SUBCLAVIAN PROX PSV: 59.2 CM/S
VAS RIGHT VERTEBRAL EDV: 12.13 CM/S
VAS RIGHT VERTEBRAL PSV: 39.2 CM/S

## 2023-11-17 PROCEDURE — 93880 EXTRACRANIAL BILAT STUDY: CPT

## 2023-11-20 DIAGNOSIS — R93.89 ABNORMAL DOPPLER ULTRASOUND OF CAROTID ARTERY: Primary | ICD-10-CM

## 2023-11-20 PROCEDURE — 93880 EXTRACRANIAL BILAT STUDY: CPT | Performed by: SURGERY

## 2023-11-22 ENCOUNTER — TELEPHONE (OUTPATIENT)
Age: 68
End: 2023-11-22

## 2023-11-22 NOTE — TELEPHONE ENCOUNTER
Pt called stating he was supposed to have a referral for [facility] sent on Monday and they have not received it yet. Please advise.

## 2023-11-24 DIAGNOSIS — R93.89 ABNORMAL DOPPLER ULTRASOUND OF CAROTID ARTERY: Primary | ICD-10-CM

## 2023-11-24 NOTE — TELEPHONE ENCOUNTER
An appointment request has been faxed to Dr. Jesse Salas at [facility]. A fax confirmation has been received. His office will contact patient to schedule an appointment.

## 2023-11-24 NOTE — PROGRESS NOTES
Patient is established at [facility]. Vascular surgery referral will be faxed to Dr. Susana Toscano. His office will contact patient to schedule an appointment.

## 2023-12-06 ENCOUNTER — TELEPHONE (OUTPATIENT)
Age: 68
End: 2023-12-06

## 2023-12-06 NOTE — TELEPHONE ENCOUNTER
----- Message from Dionna Trianacallie Kim sent at 12/6/2023 11:11 AM EST -----  Subject: Message to Provider    QUESTIONS  Information for Provider? The patient called to follow up on referral /   fax to [facility], Dr Zabala. The patient called them   and they told him they can not find the referral. please contact the   patient about this request   ---------------------------------------------------------------------------  --------------  CALL BACK INFO  [phone]; OK to leave message on voicemail  ---------------------------------------------------------------------------  --------------  SCRIPT ANSWERS  Relationship to Patient? Self

## 2023-12-06 NOTE — TELEPHONE ENCOUNTER
Pt called stating that he has an appt with Haroon Zabala and he needs the images put on a disc and sent to the Drs office. I advised that he would have to go to [facility] and request that. Pt sttates that he lives in [facility] and that our office needs to request the disc from [facility] and have them send it to Dr Zabala's office before his appt on 1/3/2024. Please advise.

## 2023-12-12 ENCOUNTER — OFFICE VISIT (OUTPATIENT)
Age: 68
End: 2023-12-12
Payer: MEDICARE

## 2023-12-12 VITALS
SYSTOLIC BLOOD PRESSURE: 133 MMHG | HEIGHT: 74 IN | BODY MASS INDEX: 29.24 KG/M2 | OXYGEN SATURATION: 94 % | HEART RATE: 90 BPM | WEIGHT: 227.8 LBS | DIASTOLIC BLOOD PRESSURE: 85 MMHG

## 2023-12-12 DIAGNOSIS — I50.22 CHRONIC SYSTOLIC (CONGESTIVE) HEART FAILURE (HCC): Primary | ICD-10-CM

## 2023-12-12 DIAGNOSIS — Z98.2 S/P VP SHUNT: ICD-10-CM

## 2023-12-12 DIAGNOSIS — E78.00 PURE HYPERCHOLESTEROLEMIA: ICD-10-CM

## 2023-12-12 DIAGNOSIS — I77.810 AORTIC ECTASIA, THORACIC (HCC): ICD-10-CM

## 2023-12-12 DIAGNOSIS — I10 ESSENTIAL (PRIMARY) HYPERTENSION: ICD-10-CM

## 2023-12-12 DIAGNOSIS — Z86.73 HISTORY OF CVA (CEREBROVASCULAR ACCIDENT): ICD-10-CM

## 2023-12-12 DIAGNOSIS — I35.1 NONRHEUMATIC AORTIC VALVE INSUFFICIENCY: ICD-10-CM

## 2023-12-12 PROCEDURE — 99214 OFFICE O/P EST MOD 30 MIN: CPT | Performed by: INTERNAL MEDICINE

## 2023-12-12 PROCEDURE — 1123F ACP DISCUSS/DSCN MKR DOCD: CPT | Performed by: INTERNAL MEDICINE

## 2023-12-12 PROCEDURE — 3079F DIAST BP 80-89 MM HG: CPT | Performed by: INTERNAL MEDICINE

## 2023-12-12 PROCEDURE — 3075F SYST BP GE 130 - 139MM HG: CPT | Performed by: INTERNAL MEDICINE

## 2023-12-12 RX ORDER — CARVEDILOL 3.12 MG/1
3.12 TABLET ORAL 2 TIMES DAILY
Qty: 60 TABLET | Refills: 3 | Status: SHIPPED | OUTPATIENT
Start: 2023-12-12

## 2023-12-12 RX ORDER — HYDRALAZINE HYDROCHLORIDE 25 MG/1
25 TABLET, FILM COATED ORAL 3 TIMES DAILY
Qty: 270 TABLET | Refills: 3 | Status: SHIPPED | OUTPATIENT
Start: 2023-12-12

## 2023-12-12 RX ORDER — SACUBITRIL AND VALSARTAN 97; 103 MG/1; MG/1
1 TABLET, FILM COATED ORAL 2 TIMES DAILY
Qty: 180 TABLET | Refills: 1 | Status: SHIPPED | OUTPATIENT
Start: 2023-12-12

## 2023-12-12 ASSESSMENT — ENCOUNTER SYMPTOMS
EYES NEGATIVE: 1
RESPIRATORY NEGATIVE: 1
GASTROINTESTINAL NEGATIVE: 1

## 2023-12-12 NOTE — PROGRESS NOTES
Room 8    1. Have you been to the ER, urgent care clinic since your last visit? Hospitalized since your last visit? Yes When: 05/05/23 Where: [facility] Reason for visit: Syncope      2. Where do you normally have your labs drawn? [facility]      3. Do you need any refills today? Yes      4. Which local pharmacy do you use? [address]      5. Which mail order pharmacy do you use? None       6. Are you here for surgical clearance? No ,who will be doing your procedure/surgery (care team)?    N/A
medicines :  blood pressure is controlled. Heart rate is hide normal.  Add low-dose Coreg. Check labs and if renal function and potassium acceptable, consider Aldactone. Van Espinal is expensive for patient and will wait on it since he has not taken it for 6 to 7 months. Will try to get samples of Entresto but also sending a prescription. Exercise Plan:  Walk as much as possible preferably 30 to 40 minutes a day  Diet plan: Mediterranean diet guidelines reinforced .

## 2023-12-16 LAB
ALBUMIN SERPL-MCNC: 3.9 G/DL (ref 3.9–4.9)
ALP SERPL-CCNC: 66 IU/L (ref 44–121)
ALT SERPL-CCNC: 13 IU/L (ref 0–44)
AST SERPL-CCNC: 14 IU/L (ref 0–40)
BASOPHILS # BLD AUTO: 0.1 X10E3/UL (ref 0–0.2)
BASOPHILS NFR BLD AUTO: 1 %
BILIRUB DIRECT SERPL-MCNC: 0.15 MG/DL (ref 0–0.4)
BILIRUB SERPL-MCNC: 0.6 MG/DL (ref 0–1.2)
BUN SERPL-MCNC: 20 MG/DL (ref 8–27)
BUN/CREAT SERPL: 14 (ref 10–24)
CALCIUM SERPL-MCNC: 8.9 MG/DL (ref 8.6–10.2)
CHLORIDE SERPL-SCNC: 105 MMOL/L (ref 96–106)
CHOLEST SERPL-MCNC: 122 MG/DL (ref 100–199)
CO2 SERPL-SCNC: 23 MMOL/L (ref 20–29)
CREAT SERPL-MCNC: 1.42 MG/DL (ref 0.76–1.27)
EGFRCR SERPLBLD CKD-EPI 2021: 54 ML/MIN/1.73
EOSINOPHIL # BLD AUTO: 0.3 X10E3/UL (ref 0–0.4)
EOSINOPHIL NFR BLD AUTO: 4 %
ERYTHROCYTE [DISTWIDTH] IN BLOOD BY AUTOMATED COUNT: 14.5 % (ref 11.6–15.4)
GLUCOSE SERPL-MCNC: 101 MG/DL (ref 70–99)
HCT VFR BLD AUTO: 43.4 % (ref 37.5–51)
HDLC SERPL-MCNC: 48 MG/DL
HGB BLD-MCNC: 14 G/DL (ref 13–17.7)
IMM GRANULOCYTES # BLD AUTO: 0 X10E3/UL (ref 0–0.1)
IMM GRANULOCYTES NFR BLD AUTO: 1 %
LDLC SERPL CALC-MCNC: 60 MG/DL (ref 0–99)
LYMPHOCYTES # BLD AUTO: 1.2 X10E3/UL (ref 0.7–3.1)
LYMPHOCYTES NFR BLD AUTO: 20 %
MCH RBC QN AUTO: 28.7 PG (ref 26.6–33)
MCHC RBC AUTO-ENTMCNC: 32.3 G/DL (ref 31.5–35.7)
MCV RBC AUTO: 89 FL (ref 79–97)
MONOCYTES # BLD AUTO: 0.5 X10E3/UL (ref 0.1–0.9)
MONOCYTES NFR BLD AUTO: 8 %
NEUTROPHILS # BLD AUTO: 3.9 X10E3/UL (ref 1.4–7)
NEUTROPHILS NFR BLD AUTO: 66 %
PLATELET # BLD AUTO: 223 X10E3/UL (ref 150–450)
POTASSIUM SERPL-SCNC: 4.5 MMOL/L (ref 3.5–5.2)
PROT SERPL-MCNC: 6.3 G/DL (ref 6–8.5)
RBC # BLD AUTO: 4.88 X10E6/UL (ref 4.14–5.8)
SODIUM SERPL-SCNC: 142 MMOL/L (ref 134–144)
SPECIMEN STATUS REPORT: NORMAL
TRIGL SERPL-MCNC: 69 MG/DL (ref 0–149)
VLDLC SERPL CALC-MCNC: 14 MG/DL (ref 5–40)
WBC # BLD AUTO: 5.9 X10E3/UL (ref 3.4–10.8)

## 2023-12-20 NOTE — TELEPHONE ENCOUNTER
Spoke with Dilcia at Dr. Zabala's office and was advised they did receive referral order/appointment request from our office that was sent on 11/24/2023. Dilcia stated patient has an appointment with Dr. Zabala on 1/03/2024 at 10:30 AM - check in time is 15 minutes prior. They have not received the disc with imaging.    I returned call to patient to advise Dr. Zabala's office confirmed receipt of our referral/appointment request. He was reminded upcoming appointment with Dr. Zabala is on 1/03/2024 at 10:30 AM - needs to check-in 15 minutes prior. Patient was advised he will need to contact [facility] to request imaging be placed on a disc for his upcoming vascular appointment. He was given the number to [facility], [phone]. Patient verbalized understanding. Closing encounter.

## 2024-02-23 ENCOUNTER — FOLLOW UP (OUTPATIENT)
Dept: URBAN - METROPOLITAN AREA CLINIC 1 | Facility: CLINIC | Age: 69
End: 2024-02-23

## 2024-02-23 DIAGNOSIS — H49.22: ICD-10-CM

## 2024-02-23 PROCEDURE — 99213 OFFICE O/P EST LOW 20 MIN: CPT

## 2024-02-23 ASSESSMENT — VISUAL ACUITY
OS_SC: 20/40
OD_SC: 20/50

## 2024-02-23 ASSESSMENT — TONOMETRY
OD_IOP_MMHG: 16
OS_IOP_MMHG: 16

## 2024-04-04 PROBLEM — I21.4 NON-ST ELEVATION MI (NSTEMI) (HCC): Status: RESOLVED | Noted: 2021-11-17 | Resolved: 2024-04-04

## 2024-04-04 PROBLEM — I25.2 HISTORY OF NON-ST ELEVATION MYOCARDIAL INFARCTION (NSTEMI): Status: ACTIVE | Noted: 2024-04-04

## 2024-04-16 DIAGNOSIS — I50.22 CHRONIC SYSTOLIC (CONGESTIVE) HEART FAILURE (HCC): ICD-10-CM

## 2024-04-16 RX ORDER — CARVEDILOL 3.12 MG/1
3.12 TABLET ORAL 2 TIMES DAILY
Qty: 60 TABLET | Refills: 3 | Status: SHIPPED | OUTPATIENT
Start: 2024-04-16

## 2024-04-16 NOTE — TELEPHONE ENCOUNTER
Requested Prescriptions     Pending Prescriptions Disp Refills    carvedilol (COREG) 3.125 MG tablet 60 tablet 3     Sig: Take 1 tablet by mouth 2 times daily

## 2024-04-28 DIAGNOSIS — I50.22 CHRONIC SYSTOLIC (CONGESTIVE) HEART FAILURE (HCC): ICD-10-CM

## 2024-04-29 RX ORDER — SACUBITRIL AND VALSARTAN 97; 103 MG/1; MG/1
1 TABLET, FILM COATED ORAL 2 TIMES DAILY
Qty: 60 TABLET | Refills: 3 | Status: SHIPPED | OUTPATIENT
Start: 2024-04-29

## 2024-05-31 RX ORDER — CLOPIDOGREL BISULFATE 75 MG/1
75 TABLET ORAL DAILY
Qty: 90 TABLET | Refills: 3 | Status: SHIPPED | OUTPATIENT
Start: 2024-05-31

## 2024-06-05 DIAGNOSIS — I10 ESSENTIAL (PRIMARY) HYPERTENSION: ICD-10-CM

## 2024-06-05 DIAGNOSIS — Z86.73 HISTORY OF CVA (CEREBROVASCULAR ACCIDENT): Primary | ICD-10-CM

## 2024-06-05 RX ORDER — CLOPIDOGREL BISULFATE 75 MG/1
75 TABLET ORAL DAILY
Qty: 90 TABLET | Refills: 3 | Status: SHIPPED | OUTPATIENT
Start: 2024-06-05

## 2024-06-05 NOTE — TELEPHONE ENCOUNTER
Requested Prescriptions     Pending Prescriptions Disp Refills    clopidogrel (PLAVIX) 75 MG tablet 90 tablet 3     Sig: Take 1 tablet by mouth daily

## 2024-06-12 RX ORDER — VALSARTAN 160 MG/1
160 TABLET ORAL DAILY
Qty: 90 TABLET | Refills: 3 | Status: SHIPPED | OUTPATIENT
Start: 2024-06-12

## 2024-06-12 NOTE — TELEPHONE ENCOUNTER
Requested Prescriptions     Pending Prescriptions Disp Refills    valsartan (DIOVAN) 160 MG tablet 90 tablet 3     Sig: Take 1 tablet by mouth daily

## 2024-08-05 ENCOUNTER — TELEPHONE (OUTPATIENT)
Facility: CLINIC | Age: 69
End: 2024-08-05

## 2024-08-05 DIAGNOSIS — R26.2 DIFFICULTY IN WALKING: Primary | ICD-10-CM

## 2024-08-05 NOTE — TELEPHONE ENCOUNTER
Pt states that he is not improving and would like to know if he can get a referral for home PT. Pt would lik eto get it with [facility] he has [employer]

## 2024-08-14 DIAGNOSIS — I50.22 CHRONIC SYSTOLIC (CONGESTIVE) HEART FAILURE (HCC): ICD-10-CM

## 2024-08-14 RX ORDER — CARVEDILOL 3.12 MG/1
3.12 TABLET ORAL 2 TIMES DAILY
Qty: 60 TABLET | Refills: 3 | Status: SHIPPED | OUTPATIENT
Start: 2024-08-14 | End: 2024-08-16 | Stop reason: SDUPTHER

## 2024-08-16 DIAGNOSIS — I50.22 CHRONIC SYSTOLIC (CONGESTIVE) HEART FAILURE (HCC): ICD-10-CM

## 2024-08-16 RX ORDER — CARVEDILOL 3.12 MG/1
3.12 TABLET ORAL 2 TIMES DAILY
Qty: 60 TABLET | Refills: 3 | Status: SHIPPED | OUTPATIENT
Start: 2024-08-16

## 2024-09-06 DIAGNOSIS — E78.00 PURE HYPERCHOLESTEROLEMIA: Primary | ICD-10-CM

## 2024-09-06 RX ORDER — ATORVASTATIN CALCIUM 80 MG/1
80 TABLET, FILM COATED ORAL DAILY
Qty: 90 TABLET | Refills: 3 | Status: SHIPPED | OUTPATIENT
Start: 2024-09-06

## 2024-09-06 NOTE — TELEPHONE ENCOUNTER
Requested Prescriptions     Pending Prescriptions Disp Refills    atorvastatin (LIPITOR) 80 MG tablet 90 tablet 3     Sig: Take 1 tablet by mouth daily

## 2024-09-19 ENCOUNTER — OFFICE VISIT (OUTPATIENT)
Age: 69
End: 2024-09-19
Payer: MEDICARE

## 2024-09-19 VITALS
OXYGEN SATURATION: 96 % | SYSTOLIC BLOOD PRESSURE: 121 MMHG | HEIGHT: 74 IN | BODY MASS INDEX: 29.25 KG/M2 | HEART RATE: 73 BPM | DIASTOLIC BLOOD PRESSURE: 82 MMHG

## 2024-09-19 DIAGNOSIS — E78.00 PURE HYPERCHOLESTEROLEMIA, UNSPECIFIED: ICD-10-CM

## 2024-09-19 DIAGNOSIS — Z98.2 S/P VP SHUNT: ICD-10-CM

## 2024-09-19 DIAGNOSIS — I50.22 CHRONIC SYSTOLIC (CONGESTIVE) HEART FAILURE (HCC): Primary | ICD-10-CM

## 2024-09-19 DIAGNOSIS — I35.1 NONRHEUMATIC AORTIC VALVE INSUFFICIENCY: ICD-10-CM

## 2024-09-19 DIAGNOSIS — Z86.79 HISTORY OF COMPLETE HEART BLOCK: ICD-10-CM

## 2024-09-19 DIAGNOSIS — I77.810 AORTIC ECTASIA, THORACIC (HCC): ICD-10-CM

## 2024-09-19 DIAGNOSIS — Z86.73 HISTORY OF CVA (CEREBROVASCULAR ACCIDENT): ICD-10-CM

## 2024-09-19 DIAGNOSIS — I10 ESSENTIAL (PRIMARY) HYPERTENSION: ICD-10-CM

## 2024-09-19 DIAGNOSIS — R94.31 ABNORMAL ELECTROCARDIOGRAPHY: ICD-10-CM

## 2024-09-19 DIAGNOSIS — R55 SYNCOPE, UNSPECIFIED SYNCOPE TYPE: Primary | ICD-10-CM

## 2024-09-19 PROCEDURE — 99214 OFFICE O/P EST MOD 30 MIN: CPT | Performed by: INTERNAL MEDICINE

## 2024-09-19 PROCEDURE — 1123F ACP DISCUSS/DSCN MKR DOCD: CPT | Performed by: INTERNAL MEDICINE

## 2024-09-19 PROCEDURE — 93000 ELECTROCARDIOGRAM COMPLETE: CPT | Performed by: INTERNAL MEDICINE

## 2024-09-19 PROCEDURE — 3079F DIAST BP 80-89 MM HG: CPT | Performed by: INTERNAL MEDICINE

## 2024-09-19 PROCEDURE — 3074F SYST BP LT 130 MM HG: CPT | Performed by: INTERNAL MEDICINE

## 2024-09-19 ASSESSMENT — ENCOUNTER SYMPTOMS
EYES NEGATIVE: 1
RESPIRATORY NEGATIVE: 1
GASTROINTESTINAL NEGATIVE: 1

## 2024-09-20 ENCOUNTER — TELEPHONE (OUTPATIENT)
Age: 69
End: 2024-09-20

## 2024-09-25 DIAGNOSIS — R55 SYNCOPE, UNSPECIFIED SYNCOPE TYPE: Primary | ICD-10-CM

## 2024-10-02 SDOH — HEALTH STABILITY: PHYSICAL HEALTH: ON AVERAGE, HOW MANY DAYS PER WEEK DO YOU ENGAGE IN MODERATE TO STRENUOUS EXERCISE (LIKE A BRISK WALK)?: 5 DAYS

## 2024-10-02 ASSESSMENT — PATIENT HEALTH QUESTIONNAIRE - PHQ9
1. LITTLE INTEREST OR PLEASURE IN DOING THINGS: NOT AT ALL
SUM OF ALL RESPONSES TO PHQ9 QUESTIONS 1 & 2: 0
2. FEELING DOWN, DEPRESSED OR HOPELESS: NOT AT ALL
SUM OF ALL RESPONSES TO PHQ QUESTIONS 1-9: 0

## 2024-10-02 ASSESSMENT — LIFESTYLE VARIABLES
HOW MANY STANDARD DRINKS CONTAINING ALCOHOL DO YOU HAVE ON A TYPICAL DAY: PATIENT DOES NOT DRINK
HOW OFTEN DO YOU HAVE A DRINK CONTAINING ALCOHOL: MONTHLY OR LESS

## 2024-10-03 ASSESSMENT — LIFESTYLE VARIABLES
HOW MANY STANDARD DRINKS CONTAINING ALCOHOL DO YOU HAVE ON A TYPICAL DAY: 0
HOW OFTEN DO YOU HAVE A DRINK CONTAINING ALCOHOL: 2
HOW OFTEN DO YOU HAVE SIX OR MORE DRINKS ON ONE OCCASION: 1

## 2024-10-21 DIAGNOSIS — R55 SYNCOPE: Primary | ICD-10-CM

## 2024-10-23 ENCOUNTER — TELEPHONE (OUTPATIENT)
Age: 69
End: 2024-10-23

## 2024-10-23 NOTE — TELEPHONE ENCOUNTER
Patient call office and states you call him and he needs to speak with you regarding next steps. Please call patient at [phone]  
none

## 2024-10-24 ENCOUNTER — COMPREHENSIVE EXAM (OUTPATIENT)
Dept: URBAN - METROPOLITAN AREA CLINIC 1 | Facility: CLINIC | Age: 69
End: 2024-10-24

## 2024-10-24 DIAGNOSIS — H34.211: ICD-10-CM

## 2024-10-24 DIAGNOSIS — I72.9: ICD-10-CM

## 2024-10-24 DIAGNOSIS — H25.813: ICD-10-CM

## 2024-10-24 DIAGNOSIS — H43.812: ICD-10-CM

## 2024-10-24 PROCEDURE — 99214 OFFICE O/P EST MOD 30 MIN: CPT

## 2024-10-29 ENCOUNTER — TELEPHONE (OUTPATIENT)
Age: 69
End: 2024-10-29

## 2024-10-29 NOTE — PERIOP NOTE
Pre-procedure instructions for your appointment at  [facility]      Procedure: Permanent Pacemaker Placement  Procedure Date: Monday 11/4  Procedure Time: 8:00 am  Physician: Dr. Linwood Abraham    Prior to your scheduled procedure please make sure to do the following:    Make arrangements for an adult relative or friend (18 years or older) to drive you home after your procedure. Cabs or rideshares are not allowed when sedation has been used. You will also need a responsible adult to stay with you for 24 hours afterward    If your doctor gave orders for lab work, make sure to get it done within two weeks prior to your procedure. If any of your results are abnormal we may be able to address them without having to reschedule    Do not eat or drink anything after midnight prior to your procedure. In the morning take ONLY your hydralazine (Apresoline) and valsartan (Diovan) with just enough water to swallow them down. You can resume your normal medications after your procedure or at their next scheduled time unless you receive specific instructions otherwise      On the day of your procedure, come to the [facility] [place] located at the back of [facility] on [address]. Please arrive by 6:45 am and bring the following with you:     Your photo ID, insurance information, and co-pay (if required)    Any pertinent legal documents such as an Advance Directive, Medical Power of , or DNR (Do Not Resuscitate)    A current list of all the medications and supplements you are taking, including \"prn\" or occasional use meds such as for pain or allergy    If you use one, bring your inhaler    There is a small chance you may need to stay in the hospital overnight so bring a bag with essentials including your CPAP machine if you use one    Contact lenses can be worn to the hospital. Glasses and dentures can be worn but may need to be removed prior to your procedure so please bring a case or

## 2024-10-29 NOTE — TELEPHONE ENCOUNTER
[facility]          Patient  EP Instructions    Patient’s Name:  Cristino Wilkerson    You are scheduled to have a dual-chamber pacemaker implant  on  11/04/2024 , at 0800a.    Please check in at 0700a.    Please go to [facility] and park in the outpatient parking lot that is located around to the back of the hospital and enter through the [facility] [place].   Once you enter through the [facility] check in with the  there.  The  will either give you directions or assist you in getting to the cath holding area.          3.  You are not to eat or drink anything after midnight the night before your procedure.     Please continue to take your medications with a small sip of water on the morning of the procedure with the following exceptions:  hold plavix for five days prior to procedure.     If you are diabetic, do not take your insulin/sugar pill the morning of the procedure.    We encourage families to wait in the waiting room on the first floor while the procedure is being done.  The Doctor will come out and talk with you as soon as the procedure is over.    There is the possibility that you may spend the night in the hospital, depending on the results of the procedure.  This will be determined after the procedure is done.     8.   If you or your family have any questions, please call our office Monday-Friday 9:00am         -4:30 pm , at [phone], and ask to speak to one of the nurses.        OBTAIN LABWORK ORDERED SEVEN (7)  DAYS PRIOR TO PROCEDURE

## 2024-10-29 NOTE — TELEPHONE ENCOUNTER
Spoke with patient at this time; patient given all pre-procedure instructions , as well as instructions for labwork required; patient verbalizes understanding.

## 2024-10-30 NOTE — FLOWSHEET NOTE
Called to verify arrival time of 0645 for 0800 PPM procedure on 11/4. Pre-procedure instructions verified including NPO after midnight and which meds to take and/or hold. All questions answered, patient verbalized understanding.

## 2024-11-01 ENCOUNTER — ANESTHESIA EVENT (OUTPATIENT)
Facility: HOSPITAL | Age: 69
End: 2024-11-01
Payer: MEDICARE

## 2024-11-01 NOTE — H&P
Had brain aneurysm    Stroke Neg Hx              Social History   Social History            Tobacco Use    Smoking status: Former       Current packs/day: 0.00       Types: Cigarettes       Quit date: 10/5/2021       Years since quittin.9    Smokeless tobacco: Never   Substance Use Topics    Alcohol use: Not Currently    Drug use: No            Current Facility-Administered Medications          Current Outpatient Medications   Medication Sig Dispense Refill    atorvastatin (LIPITOR) 80 MG tablet Take 1 tablet by mouth daily 90 tablet 3    carvedilol (COREG) 3.125 MG tablet Take 1 tablet by mouth 2 times daily 60 tablet 3    valsartan (DIOVAN) 160 MG tablet Take 1 tablet by mouth daily 90 tablet 3    clopidogrel (PLAVIX) 75 MG tablet Take 1 tablet by mouth daily 90 tablet 3    hydrALAZINE (APRESOLINE) 25 MG tablet Take 1 tablet by mouth 3 times daily 270 tablet 3    famotidine (PEPCID) 20 MG tablet Take 1 tablet by mouth daily as needed        loratadine (CLARITIN) 10 MG tablet Take 1 tablet by mouth daily        nitroGLYCERIN (NITROSTAT) 0.4 MG SL tablet Place 1 tablet under the tongue        sodium bicarbonate 650 MG tablet Take by mouth 2 times daily        acetaminophen (TYLENOL) 325 MG tablet Take 1 tablet by mouth every 4 hours as needed          No current facility-administered medications for this visit.            Past Surgical History         Past Surgical History:   Procedure Laterality Date    APPENDECTOMY                Vitals        Vitals:     24 0927 24 0950   BP: (!) 141/81 121/82   Site: Left Upper Arm Right Upper Arm   Position: Sitting Sitting   Pulse: 80 73   SpO2: 96%     Height: 1.88 m (6' 2\")                 Diagnostic Studies:  I have reviewed the relevant tests done on the patient and show as follows  EKG tracings reviewed by me today.        Mr. Wilkerson has a reminder for a \"due or due soon\" health maintenance. I have asked that he contact his primary care provider for

## 2024-11-04 ENCOUNTER — ANESTHESIA (OUTPATIENT)
Facility: HOSPITAL | Age: 69
End: 2024-11-04
Payer: MEDICARE

## 2024-11-04 ENCOUNTER — HOSPITAL ENCOUNTER (OUTPATIENT)
Facility: HOSPITAL | Age: 69
Setting detail: OUTPATIENT SURGERY
Discharge: HOME | End: 2024-11-04
Attending: INTERNAL MEDICINE | Admitting: INTERNAL MEDICINE
Payer: MEDICARE

## 2024-11-04 ENCOUNTER — APPOINTMENT (OUTPATIENT)
Facility: HOSPITAL | Age: 69
End: 2024-11-04
Attending: INTERNAL MEDICINE
Payer: MEDICARE

## 2024-11-04 VITALS
OXYGEN SATURATION: 91 % | BODY MASS INDEX: 32.08 KG/M2 | WEIGHT: 250 LBS | SYSTOLIC BLOOD PRESSURE: 132 MMHG | RESPIRATION RATE: 20 BRPM | DIASTOLIC BLOOD PRESSURE: 97 MMHG | HEART RATE: 73 BPM | HEIGHT: 74 IN | TEMPERATURE: 97.8 F

## 2024-11-04 DIAGNOSIS — R00.0 TACHYCARDIA, UNSPECIFIED: ICD-10-CM

## 2024-11-04 DIAGNOSIS — I10 ESSENTIAL (PRIMARY) HYPERTENSION: ICD-10-CM

## 2024-11-04 DIAGNOSIS — I44.2 ATRIOVENTRICULAR BLOCK, COMPLETE (HCC): ICD-10-CM

## 2024-11-04 DIAGNOSIS — Z86.73 HISTORY OF CVA (CEREBROVASCULAR ACCIDENT): ICD-10-CM

## 2024-11-04 DIAGNOSIS — Z95.0 PACEMAKER: Primary | ICD-10-CM

## 2024-11-04 DIAGNOSIS — I50.9 HEART FAILURE, UNSPECIFIED HF CHRONICITY, UNSPECIFIED HEART FAILURE TYPE (HCC): ICD-10-CM

## 2024-11-04 LAB
ANION GAP BLD CALC-SCNC: ABNORMAL (ref 10–20)
ANION GAP SERPL CALC-SCNC: 3 MMOL/L (ref 3–18)
BASOPHILS # BLD: 0.1 K/UL (ref 0–0.1)
BASOPHILS NFR BLD: 1 % (ref 0–2)
BUN SERPL-MCNC: 24 MG/DL (ref 7–18)
BUN/CREAT SERPL: 15 (ref 12–20)
CA-I BLD-MCNC: 1.27 MMOL/L (ref 1.15–1.33)
CALCIUM SERPL-MCNC: 8.9 MG/DL (ref 8.5–10.1)
CHLORIDE BLD-SCNC: 108 MMOL/L (ref 100–111)
CHLORIDE SERPL-SCNC: 112 MMOL/L (ref 100–111)
CO2 SERPL-SCNC: 28 MMOL/L (ref 21–32)
CREAT SERPL-MCNC: 1.58 MG/DL (ref 0.6–1.3)
CREAT UR-MCNC: 1.4 MG/DL (ref 0.6–1.3)
DIFFERENTIAL METHOD BLD: ABNORMAL
ECHO BSA: 2.43 M2
EOSINOPHIL # BLD: 0.4 K/UL (ref 0–0.4)
EOSINOPHIL NFR BLD: 5 % (ref 0–5)
ERYTHROCYTE [DISTWIDTH] IN BLOOD BY AUTOMATED COUNT: 15.9 % (ref 11.6–14.5)
GLUCOSE BLD STRIP.AUTO-MCNC: 102 MG/DL (ref 74–99)
GLUCOSE SERPL-MCNC: 102 MG/DL (ref 74–99)
HCT VFR BLD AUTO: 44.6 % (ref 36–48)
HGB BLD-MCNC: 14 G/DL (ref 13–16)
IMM GRANULOCYTES # BLD AUTO: 0 K/UL (ref 0–0.04)
IMM GRANULOCYTES NFR BLD AUTO: 0 % (ref 0–0.5)
INR PPP: 1 (ref 0.9–1.1)
LYMPHOCYTES # BLD: 1.4 K/UL (ref 0.9–3.6)
LYMPHOCYTES NFR BLD: 18 % (ref 21–52)
MCH RBC QN AUTO: 28.8 PG (ref 24–34)
MCHC RBC AUTO-ENTMCNC: 31.4 G/DL (ref 31–37)
MCV RBC AUTO: 91.8 FL (ref 78–100)
MONOCYTES # BLD: 0.7 K/UL (ref 0.05–1.2)
MONOCYTES NFR BLD: 9 % (ref 3–10)
NEUTS SEG # BLD: 5 K/UL (ref 1.8–8)
NEUTS SEG NFR BLD: 67 % (ref 40–73)
NRBC # BLD: 0 K/UL (ref 0–0.01)
NRBC BLD-RTO: 0 PER 100 WBC
PLATELET # BLD AUTO: 185 K/UL (ref 135–420)
PMV BLD AUTO: 9.7 FL (ref 9.2–11.8)
POTASSIUM BLD-SCNC: 4.7 MMOL/L (ref 3.5–5.5)
POTASSIUM SERPL-SCNC: 4.4 MMOL/L (ref 3.5–5.5)
PROTHROMBIN TIME: 13.8 SEC (ref 11.9–14.9)
RBC # BLD AUTO: 4.86 M/UL (ref 4.35–5.65)
SODIUM BLD-SCNC: 145 MMOL/L (ref 136–145)
SODIUM SERPL-SCNC: 143 MMOL/L (ref 136–145)
WBC # BLD AUTO: 7.5 K/UL (ref 4.6–13.2)

## 2024-11-04 PROCEDURE — 6370000000 HC RX 637 (ALT 250 FOR IP): Performed by: INTERNAL MEDICINE

## 2024-11-04 PROCEDURE — 71045 X-RAY EXAM CHEST 1 VIEW: CPT

## 2024-11-04 PROCEDURE — 33208 INSRT HEART PM ATRIAL & VENT: CPT | Performed by: INTERNAL MEDICINE

## 2024-11-04 PROCEDURE — 2500000003 HC RX 250 WO HCPCS: Performed by: INTERNAL MEDICINE

## 2024-11-04 PROCEDURE — 6360000002 HC RX W HCPCS: Performed by: ANESTHESIOLOGY

## 2024-11-04 PROCEDURE — C1898 LEAD, PMKR, OTHER THAN TRANS: HCPCS | Performed by: INTERNAL MEDICINE

## 2024-11-04 PROCEDURE — C1785 PMKR, DUAL, RATE-RESP: HCPCS | Performed by: INTERNAL MEDICINE

## 2024-11-04 PROCEDURE — 80047 BASIC METABLC PNL IONIZED CA: CPT

## 2024-11-04 PROCEDURE — 3700000000 HC ANESTHESIA ATTENDED CARE: Performed by: INTERNAL MEDICINE

## 2024-11-04 PROCEDURE — 80048 BASIC METABOLIC PNL TOTAL CA: CPT

## 2024-11-04 PROCEDURE — 6360000002 HC RX W HCPCS: Performed by: INTERNAL MEDICINE

## 2024-11-04 PROCEDURE — 85025 COMPLETE CBC W/AUTO DIFF WBC: CPT

## 2024-11-04 PROCEDURE — 85610 PROTHROMBIN TIME: CPT

## 2024-11-04 PROCEDURE — 2709999900 HC NON-CHARGEABLE SUPPLY: Performed by: INTERNAL MEDICINE

## 2024-11-04 PROCEDURE — C1892 INTRO/SHEATH,FIXED,PEEL-AWAY: HCPCS | Performed by: NURSE ANESTHETIST, CERTIFIED REGISTERED

## 2024-11-04 PROCEDURE — 3700000001 HC ADD 15 MINUTES (ANESTHESIA): Performed by: INTERNAL MEDICINE

## 2024-11-04 PROCEDURE — 6360000004 HC RX CONTRAST MEDICATION: Performed by: INTERNAL MEDICINE

## 2024-11-04 DEVICE — LEAD 407652 CAPSUREFIX NOVUS US MRI
Type: IMPLANTABLE DEVICE | Status: FUNCTIONAL
Brand: CAPSUREFIX NOVUS MRI™ SURESCAN™

## 2024-11-04 DEVICE — LEAD 407658 CAPSUREFIX NOVUS US MRI
Type: IMPLANTABLE DEVICE | Status: FUNCTIONAL
Brand: CAPSUREFIX NOVUS MRI™ SURESCAN™

## 2024-11-04 DEVICE — IPG W1DR01 AZURE XT DR MRI USA
Type: IMPLANTABLE DEVICE | Status: FUNCTIONAL
Brand: AZURE™ XT DR MRI SURESCAN™

## 2024-11-04 RX ORDER — CEFAZOLIN SODIUM 1 G/3ML
INJECTION, POWDER, FOR SOLUTION INTRAMUSCULAR; INTRAVENOUS
Status: DISCONTINUED | OUTPATIENT
Start: 2024-11-04 | End: 2024-11-04 | Stop reason: SDUPTHER

## 2024-11-04 RX ORDER — MIDAZOLAM HYDROCHLORIDE 1 MG/ML
INJECTION, SOLUTION INTRAMUSCULAR; INTRAVENOUS
Status: DISCONTINUED | OUTPATIENT
Start: 2024-11-04 | End: 2024-11-04 | Stop reason: SDUPTHER

## 2024-11-04 RX ORDER — LIDOCAINE HYDROCHLORIDE 10 MG/ML
1 INJECTION, SOLUTION EPIDURAL; INFILTRATION; INTRACAUDAL; PERINEURAL
Status: DISCONTINUED | OUTPATIENT
Start: 2024-11-04 | End: 2024-11-04 | Stop reason: HOSPADM

## 2024-11-04 RX ORDER — CLOPIDOGREL BISULFATE 75 MG/1
75 TABLET ORAL DAILY
Qty: 90 TABLET | Refills: 3 | Status: SHIPPED | OUTPATIENT
Start: 2024-11-08

## 2024-11-04 RX ORDER — IOPAMIDOL 612 MG/ML
INJECTION, SOLUTION INTRAVASCULAR PRN
Status: DISCONTINUED | OUTPATIENT
Start: 2024-11-04 | End: 2024-11-04 | Stop reason: HOSPADM

## 2024-11-04 RX ORDER — ACETAMINOPHEN 325 MG/1
650 TABLET ORAL EVERY 4 HOURS PRN
Status: DISCONTINUED | OUTPATIENT
Start: 2024-11-04 | End: 2024-11-04 | Stop reason: HOSPADM

## 2024-11-04 RX ORDER — OXYCODONE AND ACETAMINOPHEN 5; 325 MG/1; MG/1
1 TABLET ORAL EVERY 4 HOURS PRN
Status: DISCONTINUED | OUTPATIENT
Start: 2024-11-04 | End: 2024-11-04 | Stop reason: HOSPADM

## 2024-11-04 RX ORDER — FAMOTIDINE 20 MG/1
20 TABLET, FILM COATED ORAL ONCE
Status: DISCONTINUED | OUTPATIENT
Start: 2024-11-04 | End: 2024-11-04 | Stop reason: HOSPADM

## 2024-11-04 RX ORDER — FENTANYL CITRATE 50 UG/ML
INJECTION, SOLUTION INTRAMUSCULAR; INTRAVENOUS
Status: DISCONTINUED | OUTPATIENT
Start: 2024-11-04 | End: 2024-11-04 | Stop reason: SDUPTHER

## 2024-11-04 RX ORDER — SODIUM CHLORIDE, SODIUM LACTATE, POTASSIUM CHLORIDE, CALCIUM CHLORIDE 600; 310; 30; 20 MG/100ML; MG/100ML; MG/100ML; MG/100ML
INJECTION, SOLUTION INTRAVENOUS CONTINUOUS
Status: DISCONTINUED | OUTPATIENT
Start: 2024-11-04 | End: 2024-11-04 | Stop reason: HOSPADM

## 2024-11-04 RX ORDER — LIDOCAINE HYDROCHLORIDE 10 MG/ML
INJECTION, SOLUTION EPIDURAL; INFILTRATION; INTRACAUDAL; PERINEURAL PRN
Status: DISCONTINUED | OUTPATIENT
Start: 2024-11-04 | End: 2024-11-04 | Stop reason: HOSPADM

## 2024-11-04 RX ORDER — OXYCODONE AND ACETAMINOPHEN 5; 325 MG/1; MG/1
1 TABLET ORAL EVERY 6 HOURS PRN
Qty: 12 TABLET | Refills: 0 | Status: SHIPPED | OUTPATIENT
Start: 2024-11-04 | End: 2024-11-07

## 2024-11-04 RX ORDER — SODIUM CHLORIDE 0.9 % (FLUSH) 0.9 %
5-40 SYRINGE (ML) INJECTION PRN
Status: DISCONTINUED | OUTPATIENT
Start: 2024-11-04 | End: 2024-11-04 | Stop reason: HOSPADM

## 2024-11-04 RX ORDER — HEPARIN SODIUM 200 [USP'U]/100ML
INJECTION, SOLUTION INTRAVENOUS CONTINUOUS PRN
Status: COMPLETED | OUTPATIENT
Start: 2024-11-04 | End: 2024-11-04

## 2024-11-04 RX ORDER — PROPOFOL 10 MG/ML
INJECTION, EMULSION INTRAVENOUS
Status: DISCONTINUED | OUTPATIENT
Start: 2024-11-04 | End: 2024-11-04 | Stop reason: SDUPTHER

## 2024-11-04 RX ORDER — SODIUM CHLORIDE 9 MG/ML
INJECTION, SOLUTION INTRAVENOUS PRN
Status: DISCONTINUED | OUTPATIENT
Start: 2024-11-04 | End: 2024-11-04 | Stop reason: HOSPADM

## 2024-11-04 RX ADMIN — FENTANYL CITRATE 50 MCG: 50 INJECTION INTRAMUSCULAR; INTRAVENOUS at 08:21

## 2024-11-04 RX ADMIN — FENTANYL CITRATE 25 MCG: 50 INJECTION INTRAMUSCULAR; INTRAVENOUS at 09:29

## 2024-11-04 RX ADMIN — PROPOFOL 30 MG: 10 INJECTION, EMULSION INTRAVENOUS at 09:08

## 2024-11-04 RX ADMIN — PROPOFOL 30 MG: 10 INJECTION, EMULSION INTRAVENOUS at 08:34

## 2024-11-04 RX ADMIN — PROPOFOL 50 MG: 10 INJECTION, EMULSION INTRAVENOUS at 08:24

## 2024-11-04 RX ADMIN — MIDAZOLAM 2 MG: 1 INJECTION, SOLUTION INTRAMUSCULAR; INTRAVENOUS at 08:16

## 2024-11-04 RX ADMIN — FENTANYL CITRATE 25 MCG: 50 INJECTION INTRAMUSCULAR; INTRAVENOUS at 09:17

## 2024-11-04 RX ADMIN — PROPOFOL 30 MG: 10 INJECTION, EMULSION INTRAVENOUS at 08:56

## 2024-11-04 RX ADMIN — CEFAZOLIN 2 G: 330 INJECTION, POWDER, FOR SOLUTION INTRAMUSCULAR; INTRAVENOUS at 08:22

## 2024-11-04 RX ADMIN — ACETAMINOPHEN 325MG 650 MG: 325 TABLET ORAL at 11:06

## 2024-11-04 RX ADMIN — PROPOFOL 30 MG: 10 INJECTION, EMULSION INTRAVENOUS at 08:45

## 2024-11-04 ASSESSMENT — PAIN DESCRIPTION - LOCATION: LOCATION: HEAD

## 2024-11-04 ASSESSMENT — PAIN SCALES - GENERAL: PAINLEVEL_OUTOF10: 5

## 2024-11-04 NOTE — ANESTHESIA POSTPROCEDURE EVALUATION
Department of Anesthesiology  Postprocedure Note    Patient: Cristino Wilkerson  MRN: [id number]  YOB: 1955  Date of evaluation: 11/4/2024    Procedure Summary       Date: 11/04/24 Room / Location: [facility] EP LAB 1 / [facility] CARDIAC CATH LAB    Anesthesia Start: 0811 Anesthesia Stop: 0941    Procedure: Insert PPM dual/MEDTRONIC Diagnosis:       Atrioventricular block, complete (HCC)      Heart failure, unspecified HF chronicity, unspecified heart failure type (HCC)      Tachycardia, unspecified      (Atrioventricular block, complete (HCC) [I44.2])      (Heart failure, unspecified HF chronicity, unspecified heart failure type (HCC) [I50.9])      (Tachycardia, unspecified [R00.0])    Providers: Linwood Abraham MD Responsible Provider: Russ Ruth MD    Anesthesia Type: MAC ASA Status: 3            Anesthesia Type: MAC    Dianne Phase I: Dianne Score: 10    Dianne Phase II:      Anesthesia Post Evaluation    Patient location during evaluation: bedside  Patient participation: complete - patient participated  Level of consciousness: responsive to verbal stimuli  Airway patency: patent  Nausea & Vomiting: no nausea  Respiratory status: acceptable  Hydration status: euvolemic    There were no known notable events for this encounter.

## 2024-11-04 NOTE — DISCHARGE INSTRUCTIONS
DISCHARGE SUMMARY from Nurse    PATIENT INSTRUCTIONS:    After general anesthesia or intravenous sedation, for 24 hours or while taking prescription Narcotics:  Limit your activities  Do not drive and operate hazardous machinery  Do not make important personal or business decisions  Do  not drink alcoholic beverages  If you have not urinated within 8 hours after discharge, please contact your surgeon on call.    Report the following to your surgeon:  Excessive pain, swelling, redness or odor of or around the surgical area  Temperature over 100.5  Nausea and vomiting lasting longer than 4 hours or if unable to take medications  Any signs of decreased circulation or nerve impairment to extremity: change in color, persistent  numbness, tingling, coldness or increase pain  Any questions    What to do at Home:  Recommended activity: activity as tolerated and no driving for today and no heavy lifting, pushing, pulling with the implant side for 2 months.    If you experience any of the following symptoms Dizziness, shortness of breath at rest, unrelieved pain, please follow up with Dr. Abraham.    *  Please give a list of your current medications to your Primary Care Provider.    *  Please update this list whenever your medications are discontinued, doses are      changed, or new medications (including over-the-counter products) are added.    *  Please carry medication information at all times in case of emergency situations.    These are general instructions for a healthy lifestyle:    No smoking/ No tobacco products/ Avoid exposure to second hand smoke  Surgeon General's Warning:  Quitting smoking now greatly reduces serious risk to your health.    Obesity, smoking, and sedentary lifestyle greatly increases your risk for illness    A healthy diet, regular physical exercise & weight monitoring are important for maintaining a healthy lifestyle    You may be retaining fluid if you have a history of heart failure or if you

## 2024-11-04 NOTE — PROGRESS NOTES
Cath holding summary:    0700: Patient via wheelchair from waiting area without difficulty, placed on monitor SR. A&O x4, no c/o pain. NPO since midnight, ID and allergies verified. H&P reviewed, med rec completed. PIV x2 inserted, blood sent to lab. Groin prep completed, consent ready for signature.    0810: Verbal report given to Mo preston Wilkerson being transferred to  for ordered procedure. Report consisted of patient's Situation, Background, Assessment and Recommendations (SBAR). Information from the following report(s) Nurse Handoff Report, Intake/Output, MAR, Recent Results, Med Rec Status, Cardiac Rhythm SR, Pre Procedure Checklist, and Procedure Verification was reviewed with the receiving nurse. Opportunity for questions and clarification was provided.    0925: Verbal report received from Sharath on Cristino Wilkerson being received from  for routine post-op. Report consisted of patient's Situation, Background, Assessment and Recommendations (SBAR). Information from the following report(s) Nurse Handoff Report, Surgery Report, Intake/Output, MAR, Recent Results, Med Rec Status, Cardiac Rhythm SR, and Event Log was reviewed with the receiving nurse. Pt A&O x4, no c/o pain. Opportunity for questions and clarification provided.  Procedure: Pacemaker/ICD  Intervention: Yes  Site: Left, Chest    0955: X-ray at bedside to perform portable chest x-ray.    1135: AVS Discharge instructions reviewed with patient and copy given to patient.  All questions answered.  Patient verbalized understanding to all discharge instructions, including when to resume all medications prescribed.  PIV removed. Procedural site within normal limits.  No hematoma or bleeding noted from procedural and PIV site. No pain noted at discharge. Patient back to neurological baseline, A&O x4. Patient discharged in the presence of a responsible adult (Wife) who will accompany patient home and is able to report post procedure complications.

## 2024-11-04 NOTE — ANESTHESIA PRE PROCEDURE
Department of Anesthesiology  Preprocedure Note       Name:  Cristino Wilkerson   Age:  69 y.o.  :  1955                                          MRN:  [id number]         Date:  2024      Surgeon: Surgeon(s):  Linwood Abraham MD    Procedure: Procedure(s):  Insert PPM dual/MEDTRONIC    Medications prior to admission:   Prior to Admission medications    Medication Sig Start Date End Date Taking? Authorizing Provider   atorvastatin (LIPITOR) 80 MG tablet Take 1 tablet by mouth daily 24  Yes Rico Ness APRN - NP   valsartan (DIOVAN) 160 MG tablet Take 1 tablet by mouth daily 24  Yes Rico Ness APRN - TRUDY   hydrALAZINE (APRESOLINE) 25 MG tablet Take 1 tablet by mouth 3 times daily 23  Yes Cathleen Armstrong MD   loratadine (CLARITIN) 10 MG tablet Take 1 tablet by mouth daily 23  Yes ProviderMynor MD   sodium bicarbonate 650 MG tablet Take by mouth 2 times daily   Yes Automatic Reconciliation, Ar   acetaminophen (TYLENOL) 325 MG tablet Take 1 tablet by mouth every 4 hours as needed   Yes Automatic Reconciliation, Ar   clopidogrel (PLAVIX) 75 MG tablet Take 1 tablet by mouth daily 24   Cathleen Armstrong MD   famotidine (PEPCID) 20 MG tablet Take 1 tablet by mouth daily as needed  Patient not taking: Reported on 2024    ProviderMynor MD   nitroGLYCERIN (NITROSTAT) 0.4 MG SL tablet Place 1 tablet under the tongue  Patient not taking: Reported on 2024    Automatic Reconciliation, Ar       Current medications:    Current Facility-Administered Medications   Medication Dose Route Frequency Provider Last Rate Last Admin   • 0.9 % sodium chloride infusion   IntraVENous PRN Linwood Abraham MD       • lidocaine PF 1 % injection 1 mL  1 mL IntraDERmal Once PRN Farhana Ellison APRN - CRNA       • famotidine (PEPCID) tablet 20 mg  20 mg Oral Once Farhana Ellison APRN - CRNA       • lactated ringers infusion   IntraVENous Continuous TerraFarhana cornelius APRN - CRNA

## 2024-11-14 ENCOUNTER — NURSE ONLY (OUTPATIENT)
Age: 69
End: 2024-11-14

## 2024-11-14 DIAGNOSIS — Z86.79 HISTORY OF COMPLETE HEART BLOCK: ICD-10-CM

## 2024-11-14 DIAGNOSIS — Z95.0 PACEMAKER: Primary | ICD-10-CM

## 2024-11-14 DIAGNOSIS — I44.2 ATRIOVENTRICULAR BLOCK, COMPLETE (HCC): ICD-10-CM

## 2024-11-14 DIAGNOSIS — R55 SYNCOPE: ICD-10-CM

## 2024-12-20 DIAGNOSIS — I10 ESSENTIAL (PRIMARY) HYPERTENSION: ICD-10-CM

## 2024-12-20 RX ORDER — HYDRALAZINE HYDROCHLORIDE 25 MG/1
25 TABLET, FILM COATED ORAL 3 TIMES DAILY
Qty: 270 TABLET | Refills: 3 | Status: SHIPPED | OUTPATIENT
Start: 2024-12-20

## 2025-01-09 ENCOUNTER — OFFICE VISIT (OUTPATIENT)
Facility: CLINIC | Age: 70
End: 2025-01-09

## 2025-01-09 VITALS
OXYGEN SATURATION: 91 % | TEMPERATURE: 98.1 F | DIASTOLIC BLOOD PRESSURE: 80 MMHG | HEIGHT: 74 IN | HEART RATE: 74 BPM | SYSTOLIC BLOOD PRESSURE: 126 MMHG | RESPIRATION RATE: 16 BRPM | BODY MASS INDEX: 32.1 KG/M2

## 2025-01-09 DIAGNOSIS — N18.31 STAGE 3A CHRONIC KIDNEY DISEASE (HCC): ICD-10-CM

## 2025-01-09 DIAGNOSIS — Z23 NEED FOR VACCINATION: ICD-10-CM

## 2025-01-09 DIAGNOSIS — Z98.2 S/P VP SHUNT: ICD-10-CM

## 2025-01-09 DIAGNOSIS — Z86.73 HISTORY OF CVA (CEREBROVASCULAR ACCIDENT): ICD-10-CM

## 2025-01-09 DIAGNOSIS — Z86.79 HISTORY OF ANEURYSM: ICD-10-CM

## 2025-01-09 DIAGNOSIS — Z11.59 NEED FOR HEPATITIS C SCREENING TEST: ICD-10-CM

## 2025-01-09 DIAGNOSIS — Z79.899 TAKING A STATIN MEDICATION: ICD-10-CM

## 2025-01-09 DIAGNOSIS — I25.2 HISTORY OF NON-ST ELEVATION MYOCARDIAL INFARCTION (NSTEMI): ICD-10-CM

## 2025-01-09 DIAGNOSIS — Z13.6 SCREENING FOR AAA (ABDOMINAL AORTIC ANEURYSM): ICD-10-CM

## 2025-01-09 DIAGNOSIS — Z87.891 FORMER SMOKER: ICD-10-CM

## 2025-01-09 DIAGNOSIS — I10 ESSENTIAL (PRIMARY) HYPERTENSION: ICD-10-CM

## 2025-01-09 DIAGNOSIS — Z00.00 MEDICARE ANNUAL WELLNESS VISIT, SUBSEQUENT: Primary | ICD-10-CM

## 2025-01-09 DIAGNOSIS — Z95.0 PRESENCE OF BIVENTRICULAR CARDIAC PACEMAKER: ICD-10-CM

## 2025-01-09 PROBLEM — I72.9 ANEURYSM (HCC): Status: RESOLVED | Noted: 2021-11-17 | Resolved: 2025-01-09

## 2025-01-09 SDOH — ECONOMIC STABILITY: FOOD INSECURITY: WITHIN THE PAST 12 MONTHS, THE FOOD YOU BOUGHT JUST DIDN'T LAST AND YOU DIDN'T HAVE MONEY TO GET MORE.: NEVER TRUE

## 2025-01-09 SDOH — ECONOMIC STABILITY: FOOD INSECURITY: WITHIN THE PAST 12 MONTHS, YOU WORRIED THAT YOUR FOOD WOULD RUN OUT BEFORE YOU GOT MONEY TO BUY MORE.: NEVER TRUE

## 2025-01-09 ASSESSMENT — PATIENT HEALTH QUESTIONNAIRE - PHQ9
SUM OF ALL RESPONSES TO PHQ QUESTIONS 1-9: 0
SUM OF ALL RESPONSES TO PHQ9 QUESTIONS 1 & 2: 0
SUM OF ALL RESPONSES TO PHQ QUESTIONS 1-9: 0
1. LITTLE INTEREST OR PLEASURE IN DOING THINGS: NOT AT ALL
SUM OF ALL RESPONSES TO PHQ QUESTIONS 1-9: 0
SUM OF ALL RESPONSES TO PHQ QUESTIONS 1-9: 0
2. FEELING DOWN, DEPRESSED OR HOPELESS: NOT AT ALL

## 2025-01-09 ASSESSMENT — LIFESTYLE VARIABLES
HOW OFTEN DO YOU HAVE A DRINK CONTAINING ALCOHOL: MONTHLY OR LESS
HOW MANY STANDARD DRINKS CONTAINING ALCOHOL DO YOU HAVE ON A TYPICAL DAY: 1 OR 2

## 2025-01-09 NOTE — ACP (ADVANCE CARE PLANNING)
Advance Care Planning     General Advance Care Planning (ACP) Conversation    Date of Conversation: 1/9/2025  Conducted with: Patient with Decision Making Capacity  Other persons present: Spouse Kelly     Healthcare Decision Maker:   Primary Decision Maker: Kelly Wilkerson - Spouse - [phone]     Today we discussed advance directive. He has not thought about it . Given hand out and form to complete and if needed he will call back for an advance directive referral   Content/Action Overview:  See above   Reviewed DNR/DNI and patient elects Full Code (Attempt Resuscitation) till he makes his decision .   treatment goals, benefit/burden of treatment options, artificial nutrition, ventilation preferences, hospitalization preferences, resuscitation preferences, end of life care preferences (vegetative state/imminent death), and hospice care  \    Length of Voluntary ACP Conversation in minutes:  <16 minutes (Non-Billable)    Zohreh Juan MD

## 2025-01-09 NOTE — PROGRESS NOTES
Cristino Wilkerson (:  1955) is a 69 y.o. male, Established patient, here for evaluation of the following chief complaint(s):  Medicare AWV         Assessment & Plan  1. Annual wellness visit.  His blood pressure and heart rate have remained stable. His kidney function, although slightly decreased, has shown stability over time. His blood counts are within normal range, with no indications of anemia. His platelet count is also satisfactory. His cholesterol levels, assessed over a year ago, were stable. His vital signs are within normal limits, and he exhibits no other symptoms. He has a history of smoking. An abdominal aortic aneurysm screening ultrasound will be ordered due to his smoking history. He is advised to undergo colon cancer screening annually. He will receive the influenza vaccine today. He is encouraged to receive the RSV, Tdap, and shingles vaccines from his local pharmacy. A hepatitis C screening test will be conducted during his next blood work. His cholesterol levels will be monitored. He is advised to complete these tests prior to his next visit.    2.  Hypertension: Stable continue current plan    3.  History of CVA and non-ST elevation MI: On risk factor management.  Following cardiology and neurology    4.  Trouble ambulation: Has done physical therapy.  Now using walker at home needs supervision for routine activity.  Family is helping.  Currently explained that no need for home health.  If any need arise family will contact.  Wife was present with the patient during entire visit.    5.  History of aneurysm and post VPS shunt: Has been following neurology and asymptomatic.  No concern.    6.  CKD: Avoiding NSAIDs.  Labs showed stable results.  Will observe    Patient and wife both understood and agreed with the plan    PROCEDURE  The patient underwent surgery for an aneurysm 3 years ago, followed by another procedure 6 months later. He also had a shunt placement. A dual-chamber pacemaker

## 2025-01-09 NOTE — PATIENT INSTRUCTIONS
ordered immunizations, labs, imaging, and/or referrals for you.  A list of these orders (if applicable) as well as your Preventive Care list are included within your After Visit Summary for your review.

## 2025-01-09 NOTE — PROGRESS NOTES
Medicare Annual Wellness Visit    Cristino Wilkerson is here for Medicare AWV    Assessment & Plan   Need for vaccination  Medicare annual wellness visit, subsequent       No follow-ups on file.     Subjective   See other note    Patient's complete Health Risk Assessment and screening values have been reviewed and are found in Flowsheets. The following problems were reviewed today and where indicated follow up appointments were made and/or referrals ordered.    Positive Risk Factor Screenings with Interventions:     Cognitive:   Clock Drawing Test (CDT): (!) Abnormal  Words recalled: 2 Words Recalled  Total Score: (!) 2  Total Score Interpretation: Abnormal Mini-Cog  Interventions:  Per wife at this time no concern.  Following neurology.  No change in behavior.            Inactivity:  On average, how many days per week do you engage in moderate to strenuous exercise (like a brisk walk)?: 0 days (!) Abnormal  On average, how many minutes do you engage in exercise at this level?: 0 min  Interventions:  Limitation due to lower extremity weakness from the stroke.  Now ambulating with walker     Abnormal BMI (obese):  Body mass index is 32.1 kg/m². (!) Abnormal  Interventions:  Diet modification.  He will work on it        Dentist Screen:  Have you seen the dentist within the past year?: (!) No    Intervention:  Advised to schedule with their dentist    Hearing Screen:  Do you or your family notice any trouble with your hearing that hasn't been managed with hearing aids?: (!) Yes    Interventions:  Patient had no concerns at this time      ADL's:   Patient reports needing help with:  Select all that apply: (!) Dressing, Grooming, Bathing, Toileting, Walking/Balance  Select all that apply: (!) Laundry, Housekeeping, Banking/Finances, Shopping, Telephone Use, Food Preparation, Transportation, Taking Medications  Interventions:  Family members especially wife helps him for routine care.  Does not require any additional help at

## 2025-01-09 NOTE — PROGRESS NOTES
\"Have you been to the ER, urgent care clinic since your last visit?  Hospitalized since your last visit?\"    [facility] Cardiac Cath Lab LoV: 11/04/24 Dr. Abraham.    “Have you seen or consulted any other health care providers outside our system since your last visit?”    Vascular Surgery, Dr. Haroon Zabala LOV: 9/18/24      “Have you had a colorectal cancer screening such as a colonoscopy/FIT/Cologuard?    Patient did stool card test through [employer] in 2024.    Date of last Colonoscopy: 12/8/2010  No cologuard on file  No FIT/FOBT on file   No flexible sigmoidoscopy on file     Chief Complaint   Patient presents with    Medicare AWV     We did not have FLUAD (Influenza) vaccine available in the office. Patient was instructed to get flu shot at the pharmacy. He verbalized understanding.

## 2025-03-03 ENCOUNTER — TELEPHONE (OUTPATIENT)
Facility: CLINIC | Age: 70
End: 2025-03-03

## 2025-03-03 NOTE — TELEPHONE ENCOUNTER
[facility]lena [facility] states that she started care fr pt for PT and she will be seeing pt 1 time a wk for 5 wks. Sending order and ok for any Dr to sign till Drake is back.

## 2025-04-30 ENCOUNTER — TELEPHONE (OUTPATIENT)
Facility: CLINIC | Age: 70
End: 2025-04-30

## 2025-04-30 NOTE — TELEPHONE ENCOUNTER
Betsy from [facility] states that pt had a missed appt today for OT because of a schedule  conflict. No fault of pt's. JANNET

## 2025-04-30 NOTE — TELEPHONE ENCOUNTER
Mana from [facility] is call to let Dr Juan that she will be continuing PT once a week for 4 wks . Please call Mana if any questions [phone]. Thank you

## 2025-06-23 RX ORDER — VALSARTAN 160 MG/1
160 TABLET ORAL DAILY
Qty: 90 TABLET | Refills: 3 | Status: SHIPPED | OUTPATIENT
Start: 2025-06-23

## 2025-07-10 ENCOUNTER — OFFICE VISIT (OUTPATIENT)
Facility: CLINIC | Age: 70
End: 2025-07-10
Payer: MEDICARE

## 2025-07-10 VITALS
BODY MASS INDEX: 32.1 KG/M2 | TEMPERATURE: 97.7 F | RESPIRATION RATE: 16 BRPM | DIASTOLIC BLOOD PRESSURE: 76 MMHG | SYSTOLIC BLOOD PRESSURE: 126 MMHG | HEIGHT: 74 IN | OXYGEN SATURATION: 94 % | HEART RATE: 82 BPM

## 2025-07-10 DIAGNOSIS — E78.00 PURE HYPERCHOLESTEROLEMIA, UNSPECIFIED: ICD-10-CM

## 2025-07-10 DIAGNOSIS — Z86.73 HISTORY OF CVA (CEREBROVASCULAR ACCIDENT): ICD-10-CM

## 2025-07-10 DIAGNOSIS — Z98.2 S/P VP SHUNT: ICD-10-CM

## 2025-07-10 DIAGNOSIS — R73.01 IMPAIRED FASTING BLOOD SUGAR: ICD-10-CM

## 2025-07-10 DIAGNOSIS — Z86.79 HISTORY OF COMPLETE HEART BLOCK: ICD-10-CM

## 2025-07-10 DIAGNOSIS — I72.4 POPLITEAL ARTERY ANEURYSM, BILATERAL: ICD-10-CM

## 2025-07-10 DIAGNOSIS — Z74.09 DECREASED AMBULATION STATUS: ICD-10-CM

## 2025-07-10 DIAGNOSIS — R79.89 ELEVATED LFTS: ICD-10-CM

## 2025-07-10 DIAGNOSIS — I10 ESSENTIAL (PRIMARY) HYPERTENSION: Primary | ICD-10-CM

## 2025-07-10 DIAGNOSIS — Z95.0 PRESENCE OF CARDIAC PACEMAKER: ICD-10-CM

## 2025-07-10 DIAGNOSIS — Z87.891 FORMER SMOKER: ICD-10-CM

## 2025-07-10 DIAGNOSIS — Z12.5 SCREENING FOR PROSTATE CANCER: ICD-10-CM

## 2025-07-10 DIAGNOSIS — N18.31 STAGE 3A CHRONIC KIDNEY DISEASE (HCC): ICD-10-CM

## 2025-07-10 DIAGNOSIS — N39.41 URGE INCONTINENCE OF URINE: ICD-10-CM

## 2025-07-10 DIAGNOSIS — I35.1 NONRHEUMATIC AORTIC VALVE INSUFFICIENCY: ICD-10-CM

## 2025-07-10 PROCEDURE — 3078F DIAST BP <80 MM HG: CPT | Performed by: FAMILY MEDICINE

## 2025-07-10 PROCEDURE — 99214 OFFICE O/P EST MOD 30 MIN: CPT | Performed by: FAMILY MEDICINE

## 2025-07-10 PROCEDURE — 1126F AMNT PAIN NOTED NONE PRSNT: CPT | Performed by: FAMILY MEDICINE

## 2025-07-10 PROCEDURE — 3074F SYST BP LT 130 MM HG: CPT | Performed by: FAMILY MEDICINE

## 2025-07-10 PROCEDURE — 1123F ACP DISCUSS/DSCN MKR DOCD: CPT | Performed by: FAMILY MEDICINE

## 2025-07-10 NOTE — PROGRESS NOTES
Have you been to the ER, urgent care clinic since your last visit?  Hospitalized since your last visit?   [facility] LOV: 2/21/25 - 2/28/25 for CVA    Have you seen or consulted any other health care providers outside our system since your last visit?   Vascular Surgery, Dr. Zabala LOV: 3/26/25      “Have you had a colorectal cancer screening such as a colonoscopy/FIT/Cologuard?    Cologuard completed in 2024. Patient will submit record for test so we can update chart.    Date of last Colonoscopy: 12/8/2010  No cologuard on file  No FIT/FOBT on file   No flexible sigmoidoscopy on file     Chief Complaint   Patient presents with    Hypertension    Chronic Kidney Disease            
edema  Neurological: awake alert and oriented. no focal neurological deficit  Psychiatric: behaviour fair  Skin: Seborrheic keratosis noted           The patient (or guardian, if applicable) and other individuals in attendance with the patient were advised that Artificial Intelligence will be utilized during this visit to record, process the conversation to generate a clinical note and to support improvement of the AI technology. The patient (or guardian, if applicable) and other individuals in attendance at the appointment consented to the use of AI, including the recording.      An electronic signature was used to authenticate this note.    --Zohreh Juan MD

## 2025-07-11 DIAGNOSIS — Z86.73 HISTORY OF CVA (CEREBROVASCULAR ACCIDENT): ICD-10-CM

## 2025-07-11 DIAGNOSIS — I10 ESSENTIAL (PRIMARY) HYPERTENSION: ICD-10-CM

## 2025-07-11 RX ORDER — CLOPIDOGREL BISULFATE 75 MG/1
75 TABLET ORAL DAILY
Qty: 90 TABLET | Refills: 3 | Status: SHIPPED | OUTPATIENT
Start: 2025-07-11

## 2025-08-26 ENCOUNTER — TELEPHONE (OUTPATIENT)
Facility: CLINIC | Age: 70
End: 2025-08-26

## 2025-08-28 ENCOUNTER — HOSPITAL ENCOUNTER (OUTPATIENT)
Facility: HOSPITAL | Age: 70
Discharge: HOME | End: 2025-08-31
Attending: FAMILY MEDICINE
Payer: MEDICARE

## 2025-08-28 DIAGNOSIS — Z13.6 SCREENING FOR AAA (ABDOMINAL AORTIC ANEURYSM): ICD-10-CM

## 2025-08-28 DIAGNOSIS — Z87.891 FORMER SMOKER: ICD-10-CM

## 2025-08-28 PROCEDURE — 76706 US ABDL AORTA SCREEN AAA: CPT

## (undated) DEVICE — SUTURE PERMA HND SZ 0 L18IN NONABSORBABLE BLK L30MM PSL REV 580H

## (undated) DEVICE — 3M™ IOBAN™ 2 ANTIMICROBIAL INCISE DRAPE 6640EZ: Brand: IOBAN™ 2

## (undated) DEVICE — DRESSING FOAM 4X6 DISP POSTOP MEPILEX BORD AG

## (undated) DEVICE — Device

## (undated) DEVICE — SYRINGE IRRIG 60ML SFT PLIABLE BLB EZ TO GRP 1 HND USE W/

## (undated) DEVICE — SUTURE MONOCRYL SZ 4 0 L18IN ABSRB VLT PS 1 L24MM 3 8 CIR REV Y682H

## (undated) DEVICE — SUTURE ABSORBABLE BRAIDED 2-0 CT-1 27 IN UD VICRYL J259H

## (undated) DEVICE — Z DUPLICATE USE 2356434 INTRODUCER SHTH J TIP 0.038 IN 7 FRX13 CM 18 GA SIDEPRT ORNG

## (undated) DEVICE — SYRINGE MED 10ML TRNSLUC BRL PLUNG BLK MRK POLYPR CTRL